# Patient Record
Sex: FEMALE | Race: WHITE | NOT HISPANIC OR LATINO
[De-identification: names, ages, dates, MRNs, and addresses within clinical notes are randomized per-mention and may not be internally consistent; named-entity substitution may affect disease eponyms.]

---

## 2017-01-03 ENCOUNTER — MEDICATION RENEWAL (OUTPATIENT)
Age: 72
End: 2017-01-03

## 2017-01-04 ENCOUNTER — MEDICATION RENEWAL (OUTPATIENT)
Age: 72
End: 2017-01-04

## 2017-02-06 ENCOUNTER — OTHER (OUTPATIENT)
Age: 72
End: 2017-02-06

## 2017-03-15 ENCOUNTER — OTHER (OUTPATIENT)
Age: 72
End: 2017-03-15

## 2017-04-01 LAB
ALBUMIN SERPL ELPH-MCNC: 4.3 G/DL
ALP BLD-CCNC: 102 U/L
ALT SERPL-CCNC: 30 U/L
ANION GAP SERPL CALC-SCNC: 17 MMOL/L
AST SERPL-CCNC: 28 U/L
BILIRUB SERPL-MCNC: 1.3 MG/DL
BUN SERPL-MCNC: 16 MG/DL
CALCIUM SERPL-MCNC: 9.8 MG/DL
CHLORIDE SERPL-SCNC: 96 MMOL/L
CHOLEST SERPL-MCNC: 198 MG/DL
CHOLEST/HDLC SERPL: 3.9 RATIO
CO2 SERPL-SCNC: 27 MMOL/L
CREAT SERPL-MCNC: 0.88 MG/DL
GLUCOSE SERPL-MCNC: 106 MG/DL
HDLC SERPL-MCNC: 51 MG/DL
LDLC SERPL CALC-MCNC: 117 MG/DL
POTASSIUM SERPL-SCNC: 3.8 MMOL/L
PROT SERPL-MCNC: 7 G/DL
SODIUM SERPL-SCNC: 140 MMOL/L
TRIGL SERPL-MCNC: 150 MG/DL

## 2017-04-05 ENCOUNTER — APPOINTMENT (OUTPATIENT)
Dept: CARDIOLOGY | Facility: CLINIC | Age: 72
End: 2017-04-05

## 2017-04-05 ENCOUNTER — NON-APPOINTMENT (OUTPATIENT)
Age: 72
End: 2017-04-05

## 2017-04-05 VITALS
DIASTOLIC BLOOD PRESSURE: 84 MMHG | HEART RATE: 71 BPM | BODY MASS INDEX: 27.49 KG/M2 | HEIGHT: 64 IN | WEIGHT: 161 LBS | OXYGEN SATURATION: 98 % | SYSTOLIC BLOOD PRESSURE: 129 MMHG

## 2017-06-26 ENCOUNTER — MEDICATION RENEWAL (OUTPATIENT)
Age: 72
End: 2017-06-26

## 2017-09-06 ENCOUNTER — OTHER (OUTPATIENT)
Age: 72
End: 2017-09-06

## 2017-09-20 LAB
ALBUMIN SERPL ELPH-MCNC: 4.4 G/DL
ALP BLD-CCNC: 87 U/L
ALT SERPL-CCNC: 36 U/L
ANION GAP SERPL CALC-SCNC: 17 MMOL/L
AST SERPL-CCNC: 35 U/L
BILIRUB SERPL-MCNC: 1.3 MG/DL
BUN SERPL-MCNC: 19 MG/DL
CALCIUM SERPL-MCNC: 10.1 MG/DL
CHLORIDE SERPL-SCNC: 96 MMOL/L
CHOLEST SERPL-MCNC: 206 MG/DL
CHOLEST/HDLC SERPL: 3.8 RATIO
CO2 SERPL-SCNC: 29 MMOL/L
CREAT SERPL-MCNC: 1 MG/DL
GLUCOSE SERPL-MCNC: 99 MG/DL
HDLC SERPL-MCNC: 54 MG/DL
LDLC SERPL CALC-MCNC: 116 MG/DL
POTASSIUM SERPL-SCNC: 4.1 MMOL/L
PROT SERPL-MCNC: 7.2 G/DL
SODIUM SERPL-SCNC: 142 MMOL/L
TRIGL SERPL-MCNC: 182 MG/DL

## 2017-09-26 ENCOUNTER — NON-APPOINTMENT (OUTPATIENT)
Age: 72
End: 2017-09-26

## 2017-09-26 ENCOUNTER — APPOINTMENT (OUTPATIENT)
Dept: CARDIOLOGY | Facility: CLINIC | Age: 72
End: 2017-09-26
Payer: MEDICARE

## 2017-09-26 VITALS
DIASTOLIC BLOOD PRESSURE: 82 MMHG | HEIGHT: 64 IN | HEART RATE: 77 BPM | WEIGHT: 163 LBS | OXYGEN SATURATION: 98 % | BODY MASS INDEX: 27.83 KG/M2 | SYSTOLIC BLOOD PRESSURE: 126 MMHG

## 2017-09-26 PROCEDURE — 99215 OFFICE O/P EST HI 40 MIN: CPT

## 2017-09-26 PROCEDURE — 93000 ELECTROCARDIOGRAM COMPLETE: CPT

## 2017-10-07 ENCOUNTER — APPOINTMENT (OUTPATIENT)
Dept: CARDIOLOGY | Facility: CLINIC | Age: 72
End: 2017-10-07
Payer: MEDICARE

## 2017-10-07 PROCEDURE — 93880 EXTRACRANIAL BILAT STUDY: CPT

## 2017-10-23 ENCOUNTER — APPOINTMENT (OUTPATIENT)
Dept: CARDIOLOGY | Facility: CLINIC | Age: 72
End: 2017-10-23
Payer: MEDICARE

## 2017-10-23 PROCEDURE — 93306 TTE W/DOPPLER COMPLETE: CPT

## 2017-11-20 ENCOUNTER — OTHER (OUTPATIENT)
Age: 72
End: 2017-11-20

## 2017-11-25 LAB
ALBUMIN SERPL ELPH-MCNC: 4.3 G/DL
ALP BLD-CCNC: 77 U/L
ALT SERPL-CCNC: 26 U/L
ANION GAP SERPL CALC-SCNC: 13 MMOL/L
AST SERPL-CCNC: 29 U/L
BILIRUB SERPL-MCNC: 1.7 MG/DL
BUN SERPL-MCNC: 16 MG/DL
CALCIUM SERPL-MCNC: 9.9 MG/DL
CHLORIDE SERPL-SCNC: 95 MMOL/L
CHOLEST SERPL-MCNC: 168 MG/DL
CHOLEST/HDLC SERPL: 3.7 RATIO
CO2 SERPL-SCNC: 31 MMOL/L
CREAT SERPL-MCNC: 0.88 MG/DL
GLUCOSE SERPL-MCNC: 95 MG/DL
HDLC SERPL-MCNC: 46 MG/DL
LDLC SERPL CALC-MCNC: 88 MG/DL
POTASSIUM SERPL-SCNC: 3.8 MMOL/L
PROT SERPL-MCNC: 7.1 G/DL
SODIUM SERPL-SCNC: 139 MMOL/L
TRIGL SERPL-MCNC: 168 MG/DL

## 2017-12-12 ENCOUNTER — RX RENEWAL (OUTPATIENT)
Age: 72
End: 2017-12-12

## 2017-12-19 ENCOUNTER — MEDICATION RENEWAL (OUTPATIENT)
Age: 72
End: 2017-12-19

## 2018-02-26 ENCOUNTER — OTHER (OUTPATIENT)
Age: 73
End: 2018-02-26

## 2018-03-25 LAB
ALBUMIN SERPL ELPH-MCNC: 4 G/DL
ALP BLD-CCNC: 82 U/L
ALT SERPL-CCNC: 40 U/L
ANION GAP SERPL CALC-SCNC: 15 MMOL/L
AST SERPL-CCNC: 35 U/L
BILIRUB SERPL-MCNC: 1.6 MG/DL
BUN SERPL-MCNC: 19 MG/DL
CALCIUM SERPL-MCNC: 10 MG/DL
CHLORIDE SERPL-SCNC: 96 MMOL/L
CHOLEST SERPL-MCNC: 129 MG/DL
CHOLEST/HDLC SERPL: 3.7 RATIO
CO2 SERPL-SCNC: 29 MMOL/L
CREAT SERPL-MCNC: 0.83 MG/DL
GLUCOSE SERPL-MCNC: 94 MG/DL
HDLC SERPL-MCNC: 35 MG/DL
LDLC SERPL CALC-MCNC: 68 MG/DL
POTASSIUM SERPL-SCNC: 3.8 MMOL/L
PROT SERPL-MCNC: 6.8 G/DL
SODIUM SERPL-SCNC: 140 MMOL/L
TRIGL SERPL-MCNC: 131 MG/DL

## 2018-04-03 ENCOUNTER — APPOINTMENT (OUTPATIENT)
Dept: CARDIOLOGY | Facility: CLINIC | Age: 73
End: 2018-04-03
Payer: MEDICARE

## 2018-04-03 ENCOUNTER — NON-APPOINTMENT (OUTPATIENT)
Age: 73
End: 2018-04-03

## 2018-04-03 VITALS
DIASTOLIC BLOOD PRESSURE: 92 MMHG | OXYGEN SATURATION: 100 % | BODY MASS INDEX: 27.31 KG/M2 | HEIGHT: 64 IN | HEART RATE: 90 BPM | WEIGHT: 160 LBS | SYSTOLIC BLOOD PRESSURE: 144 MMHG

## 2018-04-03 VITALS — SYSTOLIC BLOOD PRESSURE: 130 MMHG | DIASTOLIC BLOOD PRESSURE: 80 MMHG

## 2018-04-03 PROCEDURE — 93000 ELECTROCARDIOGRAM COMPLETE: CPT

## 2018-04-03 PROCEDURE — 99215 OFFICE O/P EST HI 40 MIN: CPT

## 2018-05-08 ENCOUNTER — APPOINTMENT (OUTPATIENT)
Dept: CARDIOLOGY | Facility: CLINIC | Age: 73
End: 2018-05-08

## 2018-05-25 ENCOUNTER — APPOINTMENT (OUTPATIENT)
Dept: CARDIOLOGY | Facility: CLINIC | Age: 73
End: 2018-05-25
Payer: MEDICARE

## 2018-05-25 PROCEDURE — 93306 TTE W/DOPPLER COMPLETE: CPT

## 2018-06-28 ENCOUNTER — MEDICATION RENEWAL (OUTPATIENT)
Age: 73
End: 2018-06-28

## 2018-09-08 LAB
ALBUMIN SERPL ELPH-MCNC: 4.6 G/DL
ALP BLD-CCNC: 74 U/L
ALT SERPL-CCNC: 30 U/L
ANION GAP SERPL CALC-SCNC: 15 MMOL/L
AST SERPL-CCNC: 33 U/L
BILIRUB SERPL-MCNC: 1.4 MG/DL
BUN SERPL-MCNC: 15 MG/DL
CALCIUM SERPL-MCNC: 10.1 MG/DL
CHLORIDE SERPL-SCNC: 101 MMOL/L
CHOLEST SERPL-MCNC: 187 MG/DL
CHOLEST/HDLC SERPL: 4 RATIO
CO2 SERPL-SCNC: 29 MMOL/L
CREAT SERPL-MCNC: 0.82 MG/DL
GLUCOSE SERPL-MCNC: 108 MG/DL
HDLC SERPL-MCNC: 47 MG/DL
LDLC SERPL CALC-MCNC: 111 MG/DL
POTASSIUM SERPL-SCNC: 4 MMOL/L
PROT SERPL-MCNC: 7.1 G/DL
SODIUM SERPL-SCNC: 145 MMOL/L
TRIGL SERPL-MCNC: 146 MG/DL

## 2018-09-18 ENCOUNTER — NON-APPOINTMENT (OUTPATIENT)
Age: 73
End: 2018-09-18

## 2018-09-18 ENCOUNTER — APPOINTMENT (OUTPATIENT)
Dept: CARDIOLOGY | Facility: CLINIC | Age: 73
End: 2018-09-18
Payer: MEDICARE

## 2018-09-18 VITALS — SYSTOLIC BLOOD PRESSURE: 130 MMHG | DIASTOLIC BLOOD PRESSURE: 78 MMHG

## 2018-09-18 VITALS
HEIGHT: 64 IN | WEIGHT: 156 LBS | OXYGEN SATURATION: 97 % | SYSTOLIC BLOOD PRESSURE: 152 MMHG | HEART RATE: 49 BPM | DIASTOLIC BLOOD PRESSURE: 79 MMHG | BODY MASS INDEX: 26.63 KG/M2

## 2018-09-18 PROCEDURE — 93000 ELECTROCARDIOGRAM COMPLETE: CPT

## 2018-09-18 PROCEDURE — 99215 OFFICE O/P EST HI 40 MIN: CPT

## 2019-03-25 ENCOUNTER — LABORATORY RESULT (OUTPATIENT)
Age: 74
End: 2019-03-25

## 2019-04-03 ENCOUNTER — APPOINTMENT (OUTPATIENT)
Dept: CARDIOLOGY | Facility: CLINIC | Age: 74
End: 2019-04-03
Payer: MEDICARE

## 2019-04-03 ENCOUNTER — NON-APPOINTMENT (OUTPATIENT)
Age: 74
End: 2019-04-03

## 2019-04-03 VITALS
WEIGHT: 160 LBS | DIASTOLIC BLOOD PRESSURE: 99 MMHG | HEART RATE: 74 BPM | HEIGHT: 64 IN | OXYGEN SATURATION: 93 % | SYSTOLIC BLOOD PRESSURE: 172 MMHG | BODY MASS INDEX: 27.31 KG/M2

## 2019-04-03 PROCEDURE — 99215 OFFICE O/P EST HI 40 MIN: CPT

## 2019-04-03 PROCEDURE — 93000 ELECTROCARDIOGRAM COMPLETE: CPT

## 2019-04-03 NOTE — PHYSICAL EXAM
[General Appearance - In No Acute Distress] : no acute distress [Normal Conjunctiva] : the conjunctiva exhibited no abnormalities [No Oral Pallor] : no oral pallor [Respiration, Rhythm And Depth] : normal respiratory rhythm and effort [Auscultation Breath Sounds / Voice Sounds] : lungs were clear to auscultation bilaterally [Bowel Sounds] : normal bowel sounds [Abdomen Tenderness] : non-tender [Abnormal Walk] : normal gait [Nail Clubbing] : no clubbing of the fingernails [] : no rash [Oriented To Time, Place, And Person] : oriented to person, place, and time [Not Palpable] : not palpable [No Precordial Heave] : no precordial heave was noted [Normal Rate] : normal [Rhythm Regular] : regular [Normal S1] : normal S1 [S2 Single] : was not split [No Gallop] : no gallop heard [Harsh] : harsh [Carotids] : the murmur was transmitted to the carotid arteries [III] : a grade 3 [2+] : left 2+ [No Abnormalities] : the abdominal aorta was not enlarged and no bruit was heard [No Pitting Edema] : no pitting edema present [FreeTextEntry1] : no JVD is appreciated at a 45° angle [Apical Thrill] : no thrill palpable at the apex [Normal S2] : abnormal S2 [Click] : no click [Pericardial Rub] : no pericardial rub [Right Carotid Bruit] : no bruit heard over the right carotid [Left Carotid Bruit] : no bruit heard over the left carotid

## 2019-04-03 NOTE — DISCUSSION/SUMMARY
[FreeTextEntry1] : Mrs. Rodríguez has been doing well from a cardiac symptomatic standpoint since her previous visit here on 9/18/18. Specifically, she has not experienced recurrence of vagally-mediated presyncope or syncope. She does not describe having experienced any signs or symptoms to suggest the development of an anginal syndrome, congestive heart failure, or a hemodynamically-compromising arrhythmia. Her electrocardiogram today reveals sinus rhythm and is within normal limits, essentially unchanged from her previous office tracing, other than supraventricular ectopy having been exhibited on the previous tracing. Her cardiac examination today is remarkable for a grade III/VI harsh systolic ejection murmur, compatible with the presence of severe aortic stenosis, unchanged from her previous visit with me. Her blood pressure reading was moderately elevated upon initial presentation to the office today, however, a follow-up measurement obtained after her examination revealed the reading to be normal.\par \par I have reviewed the findings of the blood test report of 3/25/19 in detail with the patient today, and I have instructed her to continue on the present dosages of Crestor and Zetia the time being. Unfortunately, the patient was unable to tolerate a higher dosage of the Crestor previously.\par \par I have reviewed the findings of the carotid artery Doppler study of 10/7/17 and the echocardiogram of 5/25/18 in detail with the patient today. Follow-up studies will be planned for May of 2019 for reassessment of the degree of carotid atherosclerosis and the degree of aortic stenosis, respectively.\par \par I have again discussed the implications of aortic stenosis with the patient today, including the natural history. I alerted her to the potential signs and symptoms of progressive aortic stenosis, including dyspnea on exertion, chest discomfort, and post-exertional presyncope/syncope. I have asked her to call me if she should experience any of these symptoms.\par \par The importance of proper dietary habits, weight loss, and continued regular exercise was again discussed with the patient today.\par \par I have asked the patient to call me if she should have a questions or problems pertaining to these matters, and especially if she should experience any concerning symptoms. I have otherwise asked the patient to return to the office for follow-up cardiac evaluation in 6 months, provided she remains clinically stable in the interim, and the findings on the upcoming echocardiogram and/or carotid artery Doppler studies did not want sooner evaluation and/or treatment.

## 2019-04-03 NOTE — HISTORY OF PRESENT ILLNESS
[FreeTextEntry1] : Mrs. Shauna Rodríguez presented to the office today for follow-up cardiac evaluation.\par \par The patient is a 73-year-old female with a history of vagally-mediated presyncope/syncope, ventricular ectopy, severe aortic stenosis, moderate carotid atherosclerosis, hypertension, a dyslipidemia, osteopenia, GERD, and a malignant colon polyp (status post right hemicolectomy 6/17/16).\par \par The patient has been doing well from a cardiac symptomatic standpoint since her previous visit here on 9/18/18. Specifically, she has not experienced recurrence of vagally-mediated presyncope or syncope. She has not experienced chest discomfort or dyspnea on exertion in association with her activities. She has not noted orthopnea, paroxysmal nocturnal dyspnea, or lower extremity edema. 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 She subsequently underwent laparoscopic right hemicolectomy on 6/7/16, without any associated complications. She underwent a surveillance colonoscopy in August of 2017, and states that "everything was clear". She had been experiencing  right proximal left lower extremity discomfort, describing sciatica, which improved in response to physical therapy. She then developed left-sided sciatica, which also improved with physical therapy.\par \par Echocardiography most recently performed on 5/25/18 revealed normal cardiac chamber sizes with normal left ventricular wall thickness and motion. Left ventricular systolic function was normal, with a calculated ejection fraction of 66%. There was evidence for mild left ventricular diastolic dysfunction. There was evidence for severe aortic stenosis (peak gradient 77 mmHg, estimated LENORA 0.7 cm²).\par \par Carotid artery Doppler study most recently performed on 10/7/17 revealed mild to moderate formation bilaterally, however, no significant stenoses were demonstrated.\par \par I had spoken with the patient on the telephone on 10/18/16 after reviewing her laboratory results, and I instructed her at that time to switch from Zocor 20 mg daily and Zetia 10 mg daily to Crestor 5 mg daily. I then spoke with her on 12/19/16 after reviewing her laboratory study results, and instructed her to increase the dosage of Crestor to 10 mg daily. She subsequently developed muscle aches, and when I spoke with her on the telephone on 2/6/17 regarding this issue, I instructed her to reduce the dosage of Crestor to 5 mg daily, and to resume therapy with Zetia at a dosage of 10 mg daily. She has been tolerating this regimen thus far.\par \par Laboratory studies performed on 3/25/19 revealed cholesterol 188, triglycerides 186, HDL 47, and calculated . The liver chemistries were normal. The glucose level was 96. The BUN and creatinine were 15 and 0.77, respectively. The potassium level was 4.1.\par \par Previous History:\par \par I had initially evaluated the patient on 11/1/05, regarding a syncopal episode. She had been making soup that morning, and the hot soup splattered and resulted in a minor burn to her right neck region. A few hours later, she was having her nails manicured at a salon, and the manicurist (who was from [facility]) suggested that she apply a balm to the burned area. While the manicurist was massaging this balm into the right neck region, the patient became nauseous and diaphoretic. She put her head down on the table, and experienced a brief syncopal episode. She was brought to the emergency room at [facility], where she was evaluated and subsequently released. Note that the patient reported having also experienced an episode of presyncope several years earlier, which occurred after she donated blood.\par \par As far as risk factors for coronary artery disease are concerned, the patient has a history of a dyslipidemia and hypertension. She discontinued cigarette smoking approximately 23 years ago, having previously smoked one to one and a half packs per day for a period of 20 years. She does not have a history of diabetes. Her family history is unknown, as she was raised in a foster home.\par \par Past medical history is otherwise significant for osteopenia, for which the patient has been treated with Boniva injections by an endocrinologist. She has a history of GERD. She is status post a tubal ligation procedure. The patient is status post laparoscopic right hemicolectomy on 6/7/16 for resection of a sessile polyp, with a biopsy having revealed this polyp to represent adenocarcinoma. She describes having had 2 uncomplicated pregnancies, delivered vaginally.\par \par Nuclear exercise testing performed on 11/17/05 was negative for the inducement of cardiac symptoms or electrocardiographic evidence of myocardial ischemia. The cardiac imaging portion of the study revealed normal left ventricular myocardial perfusion and systolic function, with a calculated ejection fraction of 72%.\par \par Holter monitoring performed from 11/3/05 through 11/4/05 revealed sinus rhythm throughout the recording an average rate of 76 beats per minute. Rare supraventricular premature contractions and one ventricular premature contraction were noted. No symptoms were reported during the recording period.

## 2019-04-17 ENCOUNTER — MEDICATION RENEWAL (OUTPATIENT)
Age: 74
End: 2019-04-17

## 2019-05-07 ENCOUNTER — APPOINTMENT (OUTPATIENT)
Dept: CARDIOLOGY | Facility: CLINIC | Age: 74
End: 2019-05-07
Payer: MEDICARE

## 2019-05-07 PROCEDURE — 93880 EXTRACRANIAL BILAT STUDY: CPT

## 2019-05-10 ENCOUNTER — APPOINTMENT (OUTPATIENT)
Dept: CARDIOLOGY | Facility: CLINIC | Age: 74
End: 2019-05-10
Payer: MEDICARE

## 2019-05-10 PROCEDURE — 93306 TTE W/DOPPLER COMPLETE: CPT

## 2019-08-27 ENCOUNTER — OTHER (OUTPATIENT)
Age: 74
End: 2019-08-27

## 2019-09-27 ENCOUNTER — RX RENEWAL (OUTPATIENT)
Age: 74
End: 2019-09-27

## 2019-10-02 LAB
ALBUMIN SERPL ELPH-MCNC: 4.6 G/DL
ALP BLD-CCNC: 102 U/L
ALT SERPL-CCNC: 34 U/L
ANION GAP SERPL CALC-SCNC: 15 MMOL/L
AST SERPL-CCNC: 27 U/L
BILIRUB SERPL-MCNC: 1.4 MG/DL
BUN SERPL-MCNC: 15 MG/DL
CALCIUM SERPL-MCNC: 10.1 MG/DL
CHLORIDE SERPL-SCNC: 97 MMOL/L
CHOLEST SERPL-MCNC: 146 MG/DL
CHOLEST/HDLC SERPL: 3.4 RATIO
CO2 SERPL-SCNC: 31 MMOL/L
CREAT SERPL-MCNC: 0.74 MG/DL
GLUCOSE SERPL-MCNC: 101 MG/DL
HDLC SERPL-MCNC: 43 MG/DL
LDLC SERPL CALC-MCNC: 70 MG/DL
POTASSIUM SERPL-SCNC: 3.9 MMOL/L
PROT SERPL-MCNC: 6.9 G/DL
SODIUM SERPL-SCNC: 143 MMOL/L
TRIGL SERPL-MCNC: 164 MG/DL

## 2019-10-16 ENCOUNTER — NON-APPOINTMENT (OUTPATIENT)
Age: 74
End: 2019-10-16

## 2019-10-16 ENCOUNTER — APPOINTMENT (OUTPATIENT)
Dept: CARDIOLOGY | Facility: CLINIC | Age: 74
End: 2019-10-16
Payer: MEDICARE

## 2019-10-16 VITALS
WEIGHT: 155 LBS | HEART RATE: 77 BPM | HEIGHT: 64 IN | BODY MASS INDEX: 26.46 KG/M2 | OXYGEN SATURATION: 96 % | SYSTOLIC BLOOD PRESSURE: 169 MMHG | DIASTOLIC BLOOD PRESSURE: 95 MMHG

## 2019-10-16 PROCEDURE — 99215 OFFICE O/P EST HI 40 MIN: CPT

## 2019-10-16 PROCEDURE — 93000 ELECTROCARDIOGRAM COMPLETE: CPT

## 2019-10-16 NOTE — HISTORY OF PRESENT ILLNESS
[FreeTextEntry1] : Mrs. Shauna Rodríguez presented to the office today for follow-up cardiac evaluation. I last evaluated the patient in the office on 4/3/19.\par \par The patient is a 74-year-old female with a history of vagally-mediated presyncope/syncope, ventricular ectopy, severe aortic stenosis, moderate carotid atherosclerosis, hypertension, a dyslipidemia, osteopenia, GERD, and a malignant colon polyp (status post right hemicolectomy 6/17/16).\par \par The patient has been doing well from a cardiac symptomatic standpoint since her previous visit here on 4/3/19. Specifically, she has not experienced recurrence of vagally-mediated presyncope or syncope. She has not experienced chest discomfort or dyspnea on exertion in association with her activities. She has not noted orthopnea, paroxysmal nocturnal dyspnea, or lower extremity edema. She has not experienced any episodes of palpitations.\par \par Review of systems is significant for the patient having been discovered as having a sessile cecal polyp on a routine follow-up colonoscopy performed on 4/15/16 (noting that she has a history of colonic polyps), with the biopsy revealing this polyp to be adenocarcinoma. She subsequently underwent laparoscopic right hemicolectomy on 6/7/16, without any associated complications. She underwent a surveillance colonoscopy in August of 2017, and states that "everything was clear". She had been experiencing  right proximal left lower extremity discomfort, describing sciatica, which improved in response to physical therapy. She then developed left-sided sciatica, which also improved with physical therapy.\par \par Echocardiography most recently performed on 5/10/19 revealed normal cardiac chamber size is with normal left ventricular wall thickness and wall motion. Left ventricular systolic function was normal, with an estimated ejection fraction of 65%. Impaired diastolic relaxation of the left ventricle was demonstrated. Severe aortic stenosis (peak gradient 64 mmHg, mean gradient 44 mmHg, estimated LENORA 0.4 cm²) mild aortic regurgitation were demonstrated. Very mild mitral regurgitation was demonstrated. Mild tricuspid regurgitation is demonstrated, without evidence of pulmonary hypertension.\par \par Carotid artery Doppler testing most recently performed on 5/7/19 revealed mild plaque formation involving the common carotid arteries, the bulbar regions, and the proximal portions of the internal carotid arteries bilaterally, representing a significant change when compared with a previous study performed on 10/7/17.\par \par I had spoken with the patient on the telephone on 10/18/16 after reviewing her laboratory results, and I instructed her at that time to switch from Zocor 20 mg daily and Zetia 10 mg daily to Crestor 5 mg daily. I then spoke with her on 12/19/16 after reviewing her laboratory study results, and instructed her to increase the dosage of Crestor to 10 mg daily. She subsequently developed muscle aches, and when I spoke with her on the telephone on 2/6/17 regarding this issue, I instructed her to reduce the dosage of Crestor to 5 mg daily, and to resume therapy with Zetia at a dosage of 10 mg daily. She has been tolerating this regimen thus far.\par \par Laboratory studies performed on 10/2/19 revealed cholesterol 146, triglycerides 164, HDL 43, and calculated LDL 70. The bilirubin was slightly elevated at 1.4, suspected as representing Gilbert's syndrome. The remainder of the liver enzymes were normal. The BUN and creatinine were 15 and 0.74, respectively. The potassium level was 3.9. The glucose level was 101.\par \par Previous History:\par \par I had initially evaluated the patient on 11/1/05, regarding a syncopal episode. She had been making soup that morning, and the hot soup splattered and resulted in a minor burn to her right neck region. A few hours later, she was having her nails manicured at a salon, and the manicurist (who was from [facility]) suggested that she apply a balm to the burned area. While the manicurist was massaging this balm into the right neck region, the patient became nauseous and diaphoretic. She put her head down on the table, and experienced a brief syncopal episode. She was brought to the emergency room at [facility], where she was evaluated and subsequently released. Note that the patient reported having also experienced an episode of presyncope several years earlier, which occurred after she donated blood.\par \par As far as risk factors for coronary artery disease are concerned, the patient has a history of a dyslipidemia and hypertension. She discontinued cigarette smoking approximately 23 years ago, having previously smoked one to one and a half packs per day for a period of 20 years. She does not have a history of diabetes. Her family history is unknown, as she was raised in a foster home.\par \par Past medical history is otherwise significant for osteopenia, for which the patient has been treated with Boniva injections by an endocrinologist. She has a history of GERD. She is status post a tubal ligation procedure. The patient is status post laparoscopic right hemicolectomy on 6/7/16 for resection of a sessile polyp, with a biopsy having revealed this polyp to represent adenocarcinoma. She describes having had 2 uncomplicated pregnancies, delivered vaginally.\par \par Nuclear exercise testing performed on 11/17/05 was negative for the inducement of cardiac symptoms or electrocardiographic evidence of myocardial ischemia. The cardiac imaging portion of the study revealed normal left ventricular myocardial perfusion and systolic function, with a calculated ejection fraction of 72%.\par \par Holter monitoring performed from 11/3/05 through 11/4/05 revealed sinus rhythm throughout the recording an average rate of 76 beats per minute. Rare supraventricular premature contractions and one ventricular premature contraction were noted. No symptoms were reported during the recording period.

## 2019-10-16 NOTE — DISCUSSION/SUMMARY
[FreeTextEntry1] : Mrs. Rodríguez has been doing well from a cardiac symptomatic standpoint since her previous visit here on 4/3/19. Specifically, she has not experienced recurrence of vagally-mediated presyncope or syncope. She does not describe having experienced any signs or symptoms to suggest the development of an anginal syndrome, congestive heart failure, or a hemodynamically-compromising arrhythmia. Her electrocardiogram today reveals sinus rhythm and is within normal limits, essentially unchanged from her previous office tracing, other than supraventricular ectopy having been exhibited on the previous tracing. Her cardiac examination today is remarkable for a grade III/VI harsh systolic ejection murmur, compatible with the presence of severe aortic stenosis, unchanged from her previous visit with me. Her blood pressure reading was moderately elevated upon initial presentation to the office today, however, a follow-up measurement obtained after her examination revealed the reading to be normal.\par \par I have reviewed the findings of the blood test report of 10/2/19 in detail with the patient today, and I have instructed her to continue on the present dosages of Crestor and Zetia the time being. Unfortunately, the patient was unable to tolerate a higher dosage of the Crestor previously.\par \par I have reviewed the findings of the carotid artery Doppler study of 5/7/19 and the echocardiogram of 5/10/19 in detail with the patient today. Follow-up studies will be planned for May of 2020 for reassessment of the degree of carotid atherosclerosis and the degree of aortic stenosis, respectively.\par \par I have again discussed the implications of aortic stenosis with the patient today, including the natural history. I alerted her to the potential signs and symptoms of progressive aortic stenosis, including dyspnea on exertion, chest discomfort, and post-exertional presyncope/syncope. I have asked her to call me if she should experience any of these symptoms.\par \par The importance of proper dietary habits, weight loss, and continued regular exercise was again discussed with the patient today.\par \par I have asked the patient to call me if she should have a questions or problems pertaining to these matters, and especially if she should experience any concerning symptoms. I have otherwise asked the patient to return to the office for follow-up cardiac evaluation in 6 months, provided she remains clinically stable in the interim. Follow-up echocardiography, carotid artery Doppler testing, and blood testing will be arranged at that time.

## 2019-10-31 ENCOUNTER — RX RENEWAL (OUTPATIENT)
Age: 74
End: 2019-10-31

## 2019-12-16 ENCOUNTER — RX RENEWAL (OUTPATIENT)
Age: 74
End: 2019-12-16

## 2020-03-30 ENCOUNTER — RX RENEWAL (OUTPATIENT)
Age: 75
End: 2020-03-30

## 2020-04-23 ENCOUNTER — RX RENEWAL (OUTPATIENT)
Age: 75
End: 2020-04-23

## 2020-06-23 ENCOUNTER — NON-APPOINTMENT (OUTPATIENT)
Age: 75
End: 2020-06-23

## 2020-06-23 ENCOUNTER — APPOINTMENT (OUTPATIENT)
Dept: CARDIOLOGY | Facility: CLINIC | Age: 75
End: 2020-06-23
Payer: MEDICARE

## 2020-06-23 VITALS
OXYGEN SATURATION: 97 % | HEIGHT: 64 IN | SYSTOLIC BLOOD PRESSURE: 138 MMHG | BODY MASS INDEX: 25.61 KG/M2 | DIASTOLIC BLOOD PRESSURE: 85 MMHG | WEIGHT: 150 LBS | HEART RATE: 72 BPM

## 2020-06-23 LAB
ANION GAP SERPL CALC-SCNC: 10 MMOL/L
BASOPHILS # BLD AUTO: 0.04 K/UL
BASOPHILS NFR BLD AUTO: 0.7 %
BUN SERPL-MCNC: 14 MG/DL
CALCIUM SERPL-MCNC: 9.6 MG/DL
CHLORIDE SERPL-SCNC: 99 MMOL/L
CHOLEST SERPL-MCNC: 168 MG/DL
CHOLEST/HDLC SERPL: 3.3 RATIO
CO2 SERPL-SCNC: 34 MMOL/L
CREAT SERPL-MCNC: 0.73 MG/DL
EOSINOPHIL # BLD AUTO: 0.23 K/UL
EOSINOPHIL NFR BLD AUTO: 3.9 %
ESTIMATED AVERAGE GLUCOSE: 123 MG/DL
GLUCOSE SERPL-MCNC: 104 MG/DL
HBA1C MFR BLD HPLC: 5.9 %
HCT VFR BLD CALC: 40.3 %
HDLC SERPL-MCNC: 50 MG/DL
HGB BLD-MCNC: 13.4 G/DL
IMM GRANULOCYTES NFR BLD AUTO: 0.2 %
LDLC SERPL CALC-MCNC: 96 MG/DL
LYMPHOCYTES # BLD AUTO: 1.5 K/UL
LYMPHOCYTES NFR BLD AUTO: 25.4 %
MAN DIFF?: NORMAL
MCHC RBC-ENTMCNC: 27 PG
MCHC RBC-ENTMCNC: 33.3 GM/DL
MCV RBC AUTO: 81.1 FL
MONOCYTES # BLD AUTO: 0.55 K/UL
MONOCYTES NFR BLD AUTO: 9.3 %
NEUTROPHILS # BLD AUTO: 3.58 K/UL
NEUTROPHILS NFR BLD AUTO: 60.5 %
PLATELET # BLD AUTO: 242 K/UL
POTASSIUM SERPL-SCNC: 3.7 MMOL/L
RBC # BLD: 4.97 M/UL
RBC # FLD: 14 %
SODIUM SERPL-SCNC: 143 MMOL/L
TRIGL SERPL-MCNC: 112 MG/DL
WBC # FLD AUTO: 5.91 K/UL

## 2020-06-23 PROCEDURE — 93000 ELECTROCARDIOGRAM COMPLETE: CPT

## 2020-06-23 PROCEDURE — 99215 OFFICE O/P EST HI 40 MIN: CPT

## 2020-07-17 ENCOUNTER — APPOINTMENT (OUTPATIENT)
Dept: CARDIOLOGY | Facility: CLINIC | Age: 75
End: 2020-07-17
Payer: MEDICARE

## 2020-07-17 PROCEDURE — 93306 TTE W/DOPPLER COMPLETE: CPT

## 2020-10-15 ENCOUNTER — RX RENEWAL (OUTPATIENT)
Age: 75
End: 2020-10-15

## 2020-10-26 ENCOUNTER — LABORATORY RESULT (OUTPATIENT)
Age: 75
End: 2020-10-26

## 2020-10-26 LAB
25(OH)D3 SERPL-MCNC: 32.4 NG/ML
ALBUMIN SERPL ELPH-MCNC: 4.5 G/DL
ALP BLD-CCNC: 100 U/L
ALT SERPL-CCNC: 62 U/L
ANION GAP SERPL CALC-SCNC: 14 MMOL/L
AST SERPL-CCNC: 49 U/L
BASOPHILS # BLD AUTO: 0.04 K/UL
BASOPHILS NFR BLD AUTO: 0.6 %
BILIRUB SERPL-MCNC: 1.2 MG/DL
BUN SERPL-MCNC: 15 MG/DL
CALCIUM SERPL-MCNC: 9.3 MG/DL
CHLORIDE SERPL-SCNC: 100 MMOL/L
CHOLEST SERPL-MCNC: 159 MG/DL
CO2 SERPL-SCNC: 29 MMOL/L
CREAT SERPL-MCNC: 0.72 MG/DL
EOSINOPHIL # BLD AUTO: 0.29 K/UL
EOSINOPHIL NFR BLD AUTO: 4.5 %
ESTIMATED AVERAGE GLUCOSE: 114 MG/DL
GLUCOSE SERPL-MCNC: 94 MG/DL
HBA1C MFR BLD HPLC: 5.6 %
HCT VFR BLD CALC: 40.2 %
HDLC SERPL-MCNC: 43 MG/DL
HGB BLD-MCNC: 13 G/DL
IMM GRANULOCYTES NFR BLD AUTO: 0.3 %
LDLC SERPL CALC-MCNC: 85 MG/DL
LYMPHOCYTES # BLD AUTO: 1.35 K/UL
LYMPHOCYTES NFR BLD AUTO: 21.2 %
MAN DIFF?: NORMAL
MCHC RBC-ENTMCNC: 27.7 PG
MCHC RBC-ENTMCNC: 32.3 GM/DL
MCV RBC AUTO: 85.5 FL
MONOCYTES # BLD AUTO: 0.54 K/UL
MONOCYTES NFR BLD AUTO: 8.5 %
NEUTROPHILS # BLD AUTO: 4.14 K/UL
NEUTROPHILS NFR BLD AUTO: 64.9 %
NONHDLC SERPL-MCNC: 116 MG/DL
PLATELET # BLD AUTO: 204 K/UL
POTASSIUM SERPL-SCNC: 4.6 MMOL/L
PROT SERPL-MCNC: 6.4 G/DL
RBC # BLD: 4.7 M/UL
RBC # FLD: 13.6 %
SODIUM SERPL-SCNC: 143 MMOL/L
T3RU NFR SERPL: 1 TBI
T4 SERPL-MCNC: 8.7 UG/DL
TRIGL SERPL-MCNC: 155 MG/DL
TSH SERPL-ACNC: 1.74 UIU/ML
WBC # FLD AUTO: 6.38 K/UL

## 2020-10-28 ENCOUNTER — NON-APPOINTMENT (OUTPATIENT)
Age: 75
End: 2020-10-28

## 2020-10-28 ENCOUNTER — APPOINTMENT (OUTPATIENT)
Dept: CARDIOLOGY | Facility: CLINIC | Age: 75
End: 2020-10-28
Payer: MEDICARE

## 2020-10-28 VITALS
HEIGHT: 64 IN | HEART RATE: 72 BPM | SYSTOLIC BLOOD PRESSURE: 128 MMHG | OXYGEN SATURATION: 97 % | BODY MASS INDEX: 25.27 KG/M2 | DIASTOLIC BLOOD PRESSURE: 69 MMHG | WEIGHT: 148 LBS

## 2020-10-28 DIAGNOSIS — I44.0 ATRIOVENTRICULAR BLOCK, FIRST DEGREE: ICD-10-CM

## 2020-10-28 PROCEDURE — 93000 ELECTROCARDIOGRAM COMPLETE: CPT

## 2020-10-28 PROCEDURE — 99215 OFFICE O/P EST HI 40 MIN: CPT

## 2021-01-04 ENCOUNTER — RX RENEWAL (OUTPATIENT)
Age: 76
End: 2021-01-04

## 2021-01-11 ENCOUNTER — RX RENEWAL (OUTPATIENT)
Age: 76
End: 2021-01-11

## 2021-01-22 ENCOUNTER — RX RENEWAL (OUTPATIENT)
Age: 76
End: 2021-01-22

## 2021-03-16 ENCOUNTER — RX RENEWAL (OUTPATIENT)
Age: 76
End: 2021-03-16

## 2021-04-15 ENCOUNTER — APPOINTMENT (OUTPATIENT)
Dept: INTERNAL MEDICINE | Facility: CLINIC | Age: 76
End: 2021-04-15
Payer: MEDICARE

## 2021-04-15 ENCOUNTER — NON-APPOINTMENT (OUTPATIENT)
Age: 76
End: 2021-04-15

## 2021-04-15 VITALS
SYSTOLIC BLOOD PRESSURE: 131 MMHG | RESPIRATION RATE: 12 BRPM | HEART RATE: 81 BPM | HEIGHT: 64 IN | DIASTOLIC BLOOD PRESSURE: 78 MMHG | OXYGEN SATURATION: 98 % | WEIGHT: 155 LBS | BODY MASS INDEX: 26.46 KG/M2

## 2021-04-15 VITALS — DIASTOLIC BLOOD PRESSURE: 78 MMHG | SYSTOLIC BLOOD PRESSURE: 120 MMHG

## 2021-04-15 DIAGNOSIS — F41.9 ANXIETY DISORDER, UNSPECIFIED: ICD-10-CM

## 2021-04-15 PROCEDURE — 99214 OFFICE O/P EST MOD 30 MIN: CPT

## 2021-04-15 NOTE — REVIEW OF SYSTEMS
[Insomnia] : insomnia [Anxiety] : anxiety [Depression] : depression [Negative] : Gastrointestinal [Suicidal] : not suicidal

## 2021-04-15 NOTE — PHYSICAL EXAM
[No Acute Distress] : no acute distress [No Lymphadenopathy] : no lymphadenopathy [Thyroid Normal, No Nodules] : the thyroid was normal and there were no nodules present [No Carotid Bruits] : no carotid bruits [No Abdominal Bruit] : a ~M bruit was not heard ~T in the abdomen [No Edema] : there was no peripheral edema [Normal] : soft, non-tender, non-distended, no masses palpated, no HSM and normal bowel sounds [No Focal Deficits] : no focal deficits [Speech Grossly Normal] : speech grossly normal [Normal Insight/Judgement] : insight and judgment were intact [de-identified] : TEARFUL

## 2021-04-15 NOTE — HISTORY OF PRESENT ILLNESS
[FreeTextEntry1] : c/o anxiety  [de-identified] : PATIENT W/ H/O HTN , HLD , C/O ANXIETY , FEELING DEPRESSED OVER HEALTH ISSUES OF HER SPOUSE WHO IS NOW IN [place].

## 2021-07-23 RX ORDER — ESCITALOPRAM OXALATE 10 MG/1
10 TABLET ORAL DAILY
Qty: 30 | Refills: 1 | Status: DISCONTINUED | COMMUNITY
Start: 2021-04-15 | End: 2021-07-23

## 2021-09-20 LAB
25(OH)D3 SERPL-MCNC: 33.3 NG/ML
ALBUMIN SERPL ELPH-MCNC: 4.5 G/DL
ALP BLD-CCNC: 98 U/L
ALT SERPL-CCNC: 43 U/L
ANION GAP SERPL CALC-SCNC: 10 MMOL/L
AST SERPL-CCNC: 38 U/L
BASOPHILS # BLD AUTO: 0.02 K/UL
BASOPHILS NFR BLD AUTO: 0.3 %
BILIRUB SERPL-MCNC: 1.2 MG/DL
BUN SERPL-MCNC: 10 MG/DL
CALCIUM SERPL-MCNC: 9.7 MG/DL
CHLORIDE SERPL-SCNC: 98 MMOL/L
CHOLEST SERPL-MCNC: 188 MG/DL
CO2 SERPL-SCNC: 31 MMOL/L
CREAT SERPL-MCNC: 0.68 MG/DL
EOSINOPHIL # BLD AUTO: 0.24 K/UL
EOSINOPHIL NFR BLD AUTO: 3.9 %
GLUCOSE SERPL-MCNC: 99 MG/DL
HCT VFR BLD CALC: 39.8 %
HDLC SERPL-MCNC: 43 MG/DL
HGB BLD-MCNC: 13.5 G/DL
IMM GRANULOCYTES NFR BLD AUTO: 0.3 %
LDLC SERPL CALC-MCNC: 95 MG/DL
LYMPHOCYTES # BLD AUTO: 1.33 K/UL
LYMPHOCYTES NFR BLD AUTO: 21.7 %
MAN DIFF?: NORMAL
MCHC RBC-ENTMCNC: 27.7 PG
MCHC RBC-ENTMCNC: 33.9 GM/DL
MCV RBC AUTO: 81.7 FL
MONOCYTES # BLD AUTO: 0.61 K/UL
MONOCYTES NFR BLD AUTO: 10 %
NEUTROPHILS # BLD AUTO: 3.91 K/UL
NEUTROPHILS NFR BLD AUTO: 63.8 %
NONHDLC SERPL-MCNC: 145 MG/DL
PLATELET # BLD AUTO: 197 K/UL
POTASSIUM SERPL-SCNC: 4.1 MMOL/L
PROT SERPL-MCNC: 6.7 G/DL
RBC # BLD: 4.87 M/UL
RBC # FLD: 13.9 %
SODIUM SERPL-SCNC: 139 MMOL/L
TRIGL SERPL-MCNC: 249 MG/DL
WBC # FLD AUTO: 6.13 K/UL

## 2021-09-21 LAB
ESTIMATED AVERAGE GLUCOSE: 120 MG/DL
HBA1C MFR BLD HPLC: 5.8 %

## 2021-09-28 ENCOUNTER — APPOINTMENT (OUTPATIENT)
Dept: CARDIOLOGY | Facility: CLINIC | Age: 76
End: 2021-09-28
Payer: MEDICARE

## 2021-09-28 ENCOUNTER — NON-APPOINTMENT (OUTPATIENT)
Age: 76
End: 2021-09-28

## 2021-09-28 VITALS
BODY MASS INDEX: 26.29 KG/M2 | DIASTOLIC BLOOD PRESSURE: 82 MMHG | WEIGHT: 154 LBS | HEIGHT: 64 IN | HEART RATE: 62 BPM | SYSTOLIC BLOOD PRESSURE: 154 MMHG | OXYGEN SATURATION: 99 %

## 2021-09-28 PROCEDURE — 99215 OFFICE O/P EST HI 40 MIN: CPT

## 2021-09-28 PROCEDURE — 93000 ELECTROCARDIOGRAM COMPLETE: CPT

## 2021-09-28 RX ORDER — ALPRAZOLAM 0.25 MG/1
0.25 TABLET ORAL
Qty: 15 | Refills: 0 | Status: DISCONTINUED | COMMUNITY
Start: 2021-04-16 | End: 2021-09-28

## 2021-10-06 ENCOUNTER — MED ADMIN CHARGE (OUTPATIENT)
Age: 76
End: 2021-10-06

## 2021-10-06 ENCOUNTER — APPOINTMENT (OUTPATIENT)
Dept: CARDIOLOGY | Facility: CLINIC | Age: 76
End: 2021-10-06
Payer: MEDICARE

## 2021-10-06 PROCEDURE — 93306 TTE W/DOPPLER COMPLETE: CPT

## 2021-10-06 RX ORDER — PERFLUTREN 6.52 MG/ML
6.52 INJECTION, SUSPENSION INTRAVENOUS
Qty: 2 | Refills: 0 | Status: COMPLETED | OUTPATIENT
Start: 2021-10-06

## 2021-11-09 ENCOUNTER — APPOINTMENT (OUTPATIENT)
Dept: INTERNAL MEDICINE | Facility: CLINIC | Age: 76
End: 2021-11-09
Payer: MEDICARE

## 2021-11-09 VITALS
BODY MASS INDEX: 26.46 KG/M2 | DIASTOLIC BLOOD PRESSURE: 79 MMHG | RESPIRATION RATE: 12 BRPM | HEIGHT: 64 IN | OXYGEN SATURATION: 95 % | WEIGHT: 155 LBS | SYSTOLIC BLOOD PRESSURE: 137 MMHG | HEART RATE: 87 BPM

## 2021-11-09 VITALS — DIASTOLIC BLOOD PRESSURE: 80 MMHG | SYSTOLIC BLOOD PRESSURE: 120 MMHG

## 2021-11-09 DIAGNOSIS — Z85.038 PERSONAL HISTORY OF OTHER MALIGNANT NEOPLASM OF LARGE INTESTINE: ICD-10-CM

## 2021-11-09 PROCEDURE — 99212 OFFICE O/P EST SF 10 MIN: CPT | Mod: 25

## 2021-11-09 PROCEDURE — G0439: CPT

## 2021-11-09 PROCEDURE — G0444 DEPRESSION SCREEN ANNUAL: CPT | Mod: 59

## 2021-11-10 DIAGNOSIS — G47.00 INSOMNIA, UNSPECIFIED: ICD-10-CM

## 2021-11-10 RX ORDER — ESZOPICLONE 1 MG/1
1 TABLET, FILM COATED ORAL
Qty: 15 | Refills: 0 | Status: ACTIVE | COMMUNITY
Start: 2021-11-10 | End: 1900-01-01

## 2021-11-10 NOTE — HISTORY OF PRESENT ILLNESS
[de-identified] : 1. HTN \par 2. H/O COLON CANCER : SCHEDULED FOR F/U COLONOSCOPY\par THIS MONTH , DR CARVAJAL \par 3.  HLD \par 4. c/o difficulty falling asleep , will like a sleeping pill to help her

## 2021-11-10 NOTE — ASSESSMENT
[FreeTextEntry1] : -STABLE \par -CONTINUE PRESENT MEDS \par -B/W REVIEWED \par -ADVISED LOW CHOL DIET , REDUCE CARBS

## 2021-11-10 NOTE — PHYSICAL EXAM
[No Acute Distress] : no acute distress [Well Nourished] : well nourished [Normal Sclera/Conjunctiva] : normal sclera/conjunctiva [PERRL] : pupils equal round and reactive to light [Fundoscopic Exam Performed] : fundoscopic ~T exam ~C was performed [Normal Outer Ear/Nose] : the outer ears and nose were normal in appearance [Normal Oropharynx] : the oropharynx was normal [No Lymphadenopathy] : no lymphadenopathy [Thyroid Normal, No Nodules] : the thyroid was normal and there were no nodules present [Regular Rhythm] : with a regular rhythm [Normal S1, S2] : normal S1 and S2 [No Carotid Bruits] : no carotid bruits [No Abdominal Bruit] : a ~M bruit was not heard ~T in the abdomen [No Edema] : there was no peripheral edema [No Palpable Aorta] : no palpable aorta [Declined Breast Exam] : declined breast exam  [Normal] : no posterior cervical lymphadenopathy and no anterior cervical lymphadenopathy [No CVA Tenderness] : no CVA  tenderness [No Spinal Tenderness] : no spinal tenderness [Coordination Grossly Intact] : coordination grossly intact [No Focal Deficits] : no focal deficits [Normal Insight/Judgement] : insight and judgment were intact [de-identified] : 3/6 SYSTOLIC MURMUR  RSB , APEX  [de-identified] : NO SUSPICIOUS SKIN LESIONS

## 2021-11-10 NOTE — REVIEW OF SYSTEMS
[Insomnia] : insomnia [Anxiety] : anxiety [Depression] : depression [Negative] : Genitourinary [Suicidal] : not suicidal

## 2021-11-10 NOTE — HEALTH RISK ASSESSMENT
[0] : 2) Feeling down, depressed, or hopeless: Not at all (0) [PHQ-2 Negative - No further assessment needed] : PHQ-2 Negative - No further assessment needed [Patient reported mammogram was normal] : Patient reported mammogram was normal [Patient reported colonoscopy was normal] : Patient reported colonoscopy was normal [[id number]] : 0 [MammogramDate] : 5/2021 [PapSmearComments] : PELVIC 2021: NORMAL  [ColonoscopyDate] : 2020

## 2021-12-07 ENCOUNTER — TRANSCRIPTION ENCOUNTER (OUTPATIENT)
Age: 76
End: 2021-12-07

## 2021-12-07 LAB
25(OH)D3 SERPL-MCNC: 39.2 NG/ML
ALBUMIN SERPL ELPH-MCNC: 4.3 G/DL
ALP BLD-CCNC: 94 U/L
ALT SERPL-CCNC: 27 U/L
ANION GAP SERPL CALC-SCNC: 10 MMOL/L
AST SERPL-CCNC: 32 U/L
BASOPHILS # BLD AUTO: 0.01 K/UL
BASOPHILS NFR BLD AUTO: 0.1 %
BILIRUB SERPL-MCNC: 1.6 MG/DL
BUN SERPL-MCNC: 10 MG/DL
CALCIUM SERPL-MCNC: 10 MG/DL
CHLORIDE SERPL-SCNC: 97 MMOL/L
CHOLEST SERPL-MCNC: 221 MG/DL
CO2 SERPL-SCNC: 33 MMOL/L
CREAT SERPL-MCNC: 0.74 MG/DL
EOSINOPHIL # BLD AUTO: 0.4 K/UL
EOSINOPHIL NFR BLD AUTO: 5.9 %
ESTIMATED AVERAGE GLUCOSE: 120 MG/DL
GLUCOSE SERPL-MCNC: 90 MG/DL
HBA1C MFR BLD HPLC: 5.8 %
HCT VFR BLD CALC: 40.6 %
HDLC SERPL-MCNC: 48 MG/DL
HGB BLD-MCNC: 13.6 G/DL
IMM GRANULOCYTES NFR BLD AUTO: 0.1 %
LDLC SERPL CALC-MCNC: 133 MG/DL
LYMPHOCYTES # BLD AUTO: 1.32 K/UL
LYMPHOCYTES NFR BLD AUTO: 19.5 %
MAN DIFF?: NORMAL
MCHC RBC-ENTMCNC: 26.9 PG
MCHC RBC-ENTMCNC: 33.5 GM/DL
MCV RBC AUTO: 80.2 FL
MONOCYTES # BLD AUTO: 0.63 K/UL
MONOCYTES NFR BLD AUTO: 9.3 %
NEUTROPHILS # BLD AUTO: 4.4 K/UL
NEUTROPHILS NFR BLD AUTO: 65.1 %
NONHDLC SERPL-MCNC: 173 MG/DL
PLATELET # BLD AUTO: 221 K/UL
POTASSIUM SERPL-SCNC: 4.1 MMOL/L
PROT SERPL-MCNC: 7.1 G/DL
RBC # BLD: 5.06 M/UL
RBC # FLD: 13.7 %
SODIUM SERPL-SCNC: 139 MMOL/L
TRIGL SERPL-MCNC: 200 MG/DL
TSH SERPL-ACNC: 2.09 UIU/ML
WBC # FLD AUTO: 6.77 K/UL

## 2021-12-14 ENCOUNTER — APPOINTMENT (OUTPATIENT)
Dept: CARDIOLOGY | Facility: CLINIC | Age: 76
End: 2021-12-14
Payer: MEDICARE

## 2021-12-14 ENCOUNTER — NON-APPOINTMENT (OUTPATIENT)
Age: 76
End: 2021-12-14

## 2021-12-14 VITALS
SYSTOLIC BLOOD PRESSURE: 144 MMHG | BODY MASS INDEX: 26.29 KG/M2 | OXYGEN SATURATION: 95 % | WEIGHT: 154 LBS | HEART RATE: 80 BPM | DIASTOLIC BLOOD PRESSURE: 81 MMHG | HEIGHT: 64 IN

## 2021-12-14 DIAGNOSIS — I49.1 ATRIAL PREMATURE DEPOLARIZATION: ICD-10-CM

## 2021-12-14 PROCEDURE — 99215 OFFICE O/P EST HI 40 MIN: CPT

## 2021-12-14 PROCEDURE — 93000 ELECTROCARDIOGRAM COMPLETE: CPT

## 2021-12-27 ENCOUNTER — RX RENEWAL (OUTPATIENT)
Age: 76
End: 2021-12-27

## 2022-02-15 ENCOUNTER — APPOINTMENT (OUTPATIENT)
Dept: INTERNAL MEDICINE | Facility: CLINIC | Age: 77
End: 2022-02-15
Payer: MEDICARE

## 2022-02-15 VITALS
DIASTOLIC BLOOD PRESSURE: 94 MMHG | WEIGHT: 154 LBS | OXYGEN SATURATION: 98 % | BODY MASS INDEX: 26.43 KG/M2 | SYSTOLIC BLOOD PRESSURE: 145 MMHG | HEART RATE: 82 BPM

## 2022-02-15 VITALS — SYSTOLIC BLOOD PRESSURE: 122 MMHG | DIASTOLIC BLOOD PRESSURE: 78 MMHG

## 2022-02-15 DIAGNOSIS — F41.8 OTHER SPECIFIED ANXIETY DISORDERS: ICD-10-CM

## 2022-02-15 PROCEDURE — 99214 OFFICE O/P EST MOD 30 MIN: CPT

## 2022-02-15 NOTE — PHYSICAL EXAM
[No Acute Distress] : no acute distress [Normal Sclera/Conjunctiva] : normal sclera/conjunctiva [No Carotid Bruits] : no carotid bruits [No Edema] : there was no peripheral edema [Normal] : soft, non-tender, non-distended, no masses palpated, no HSM and normal bowel sounds [Normal Supraclavicular Nodes] : no supraclavicular lymphadenopathy [Normal Posterior Cervical Nodes] : no posterior cervical lymphadenopathy [Normal Anterior Cervical Nodes] : no anterior cervical lymphadenopathy [No CVA Tenderness] : no CVA  tenderness [Normal S1, S2] : normal S1 and S2 [Normal Affect] : the affect was normal [Alert and Oriented x3] : oriented to person, place, and time [Normal Mood] : the mood was normal [Normal Insight/Judgement] : insight and judgment were intact [de-identified] : HARSH 4/6 HEART MURMUR RSB

## 2022-02-15 NOTE — HISTORY OF PRESENT ILLNESS
[FreeTextEntry1] : routine follow up [de-identified] : 1. HTN \par 2. HLD \par 3. PRE DIABETES \par 4. ANXIETY/ DEPRESSION \par FEELS WELL , JUST RETURNED FROM THE [facility] \par 5. CARDIAC MURMUR : SAW CARDIO RECENTLY \par DENEIS ANY CHEST PAINS , SOB

## 2022-03-27 DIAGNOSIS — U07.1 COVID-19: ICD-10-CM

## 2022-05-11 ENCOUNTER — APPOINTMENT (OUTPATIENT)
Dept: INTERNAL MEDICINE | Facility: CLINIC | Age: 77
End: 2022-05-11
Payer: MEDICARE

## 2022-05-11 VITALS
SYSTOLIC BLOOD PRESSURE: 158 MMHG | BODY MASS INDEX: 26.43 KG/M2 | HEART RATE: 73 BPM | OXYGEN SATURATION: 96 % | DIASTOLIC BLOOD PRESSURE: 90 MMHG | WEIGHT: 154 LBS

## 2022-05-11 VITALS — DIASTOLIC BLOOD PRESSURE: 80 MMHG | SYSTOLIC BLOOD PRESSURE: 128 MMHG

## 2022-05-11 PROCEDURE — 99214 OFFICE O/P EST MOD 30 MIN: CPT | Mod: 25

## 2022-05-11 PROCEDURE — 36415 COLL VENOUS BLD VENIPUNCTURE: CPT

## 2022-06-06 ENCOUNTER — APPOINTMENT (OUTPATIENT)
Dept: CARDIOLOGY | Facility: CLINIC | Age: 77
End: 2022-06-06
Payer: MEDICARE

## 2022-06-06 ENCOUNTER — NON-APPOINTMENT (OUTPATIENT)
Age: 77
End: 2022-06-06

## 2022-06-06 VITALS
BODY MASS INDEX: 25.33 KG/M2 | DIASTOLIC BLOOD PRESSURE: 84 MMHG | HEART RATE: 75 BPM | OXYGEN SATURATION: 96 % | SYSTOLIC BLOOD PRESSURE: 147 MMHG | WEIGHT: 152 LBS | HEIGHT: 65 IN

## 2022-06-06 DIAGNOSIS — I65.29 OCCLUSION AND STENOSIS OF UNSPECIFIED CAROTID ARTERY: ICD-10-CM

## 2022-06-06 PROCEDURE — 99215 OFFICE O/P EST HI 40 MIN: CPT

## 2022-06-06 PROCEDURE — 93000 ELECTROCARDIOGRAM COMPLETE: CPT

## 2022-08-17 ENCOUNTER — APPOINTMENT (OUTPATIENT)
Dept: INTERNAL MEDICINE | Facility: CLINIC | Age: 77
End: 2022-08-17

## 2022-08-17 VITALS
RESPIRATION RATE: 12 BRPM | HEIGHT: 65 IN | SYSTOLIC BLOOD PRESSURE: 152 MMHG | WEIGHT: 156 LBS | BODY MASS INDEX: 25.99 KG/M2 | HEART RATE: 81 BPM | TEMPERATURE: 97.9 F | OXYGEN SATURATION: 95 % | DIASTOLIC BLOOD PRESSURE: 83 MMHG

## 2022-08-17 VITALS — SYSTOLIC BLOOD PRESSURE: 138 MMHG | DIASTOLIC BLOOD PRESSURE: 80 MMHG

## 2022-08-17 DIAGNOSIS — L50.9 URTICARIA, UNSPECIFIED: ICD-10-CM

## 2022-08-17 PROCEDURE — 99214 OFFICE O/P EST MOD 30 MIN: CPT | Mod: 25

## 2022-08-17 PROCEDURE — 36415 COLL VENOUS BLD VENIPUNCTURE: CPT

## 2022-08-17 RX ORDER — CETIRIZINE HYDROCHLORIDE 10 MG/1
10 TABLET, FILM COATED ORAL DAILY
Qty: 30 | Refills: 0 | Status: ACTIVE | COMMUNITY
Start: 2022-08-17 | End: 1900-01-01

## 2022-08-18 LAB
ALBUMIN SERPL ELPH-MCNC: 4.7 G/DL
ALP BLD-CCNC: 89 U/L
ALT SERPL-CCNC: 32 U/L
ANION GAP SERPL CALC-SCNC: 15 MMOL/L
AST SERPL-CCNC: 26 U/L
BILIRUB SERPL-MCNC: 2.5 MG/DL
BUN SERPL-MCNC: 14 MG/DL
CALCIUM SERPL-MCNC: 10.2 MG/DL
CHLORIDE SERPL-SCNC: 96 MMOL/L
CHOLEST SERPL-MCNC: 252 MG/DL
CO2 SERPL-SCNC: 28 MMOL/L
CREAT SERPL-MCNC: 0.8 MG/DL
EGFR: 76 ML/MIN/1.73M2
ESTIMATED AVERAGE GLUCOSE: 120 MG/DL
GLUCOSE SERPL-MCNC: 79 MG/DL
GLUCOSE SERPL-MCNC: 85 MG/DL
HBA1C MFR BLD HPLC: 5.8 %
HDLC SERPL-MCNC: 72 MG/DL
LDLC SERPL CALC-MCNC: 132 MG/DL
NONHDLC SERPL-MCNC: 181 MG/DL
POTASSIUM SERPL-SCNC: 3.9 MMOL/L
PROT SERPL-MCNC: 7.1 G/DL
SODIUM SERPL-SCNC: 139 MMOL/L
TRIGL SERPL-MCNC: 243 MG/DL

## 2022-09-01 ENCOUNTER — APPOINTMENT (OUTPATIENT)
Dept: INTERNAL MEDICINE | Facility: CLINIC | Age: 77
End: 2022-09-01

## 2022-09-01 VITALS
SYSTOLIC BLOOD PRESSURE: 158 MMHG | RESPIRATION RATE: 12 BRPM | HEIGHT: 65 IN | HEART RATE: 91 BPM | WEIGHT: 156 LBS | OXYGEN SATURATION: 96 % | DIASTOLIC BLOOD PRESSURE: 88 MMHG | BODY MASS INDEX: 25.99 KG/M2

## 2022-09-01 VITALS — SYSTOLIC BLOOD PRESSURE: 130 MMHG | DIASTOLIC BLOOD PRESSURE: 80 MMHG

## 2022-09-01 PROCEDURE — 99213 OFFICE O/P EST LOW 20 MIN: CPT | Mod: 25

## 2022-09-01 PROCEDURE — 36415 COLL VENOUS BLD VENIPUNCTURE: CPT

## 2022-09-02 LAB
ALBUMIN SERPL ELPH-MCNC: 4.5 G/DL
ALP BLD-CCNC: 103 U/L
ALT SERPL-CCNC: 33 U/L
AST SERPL-CCNC: 31 U/L
BILIRUB DIRECT SERPL-MCNC: 0.3 MG/DL
BILIRUB INDIRECT SERPL-MCNC: 1.4 MG/DL
BILIRUB SERPL-MCNC: 1.7 MG/DL
PROT SERPL-MCNC: 6.5 G/DL

## 2022-09-19 ENCOUNTER — APPOINTMENT (OUTPATIENT)
Dept: CARDIOLOGY | Facility: CLINIC | Age: 77
End: 2022-09-19

## 2022-09-23 ENCOUNTER — NON-APPOINTMENT (OUTPATIENT)
Age: 77
End: 2022-09-23

## 2022-09-23 ENCOUNTER — APPOINTMENT (OUTPATIENT)
Dept: CARDIOLOGY | Facility: CLINIC | Age: 77
End: 2022-09-23

## 2022-09-23 VITALS
SYSTOLIC BLOOD PRESSURE: 162 MMHG | WEIGHT: 160 LBS | HEIGHT: 65 IN | HEART RATE: 73 BPM | BODY MASS INDEX: 26.66 KG/M2 | DIASTOLIC BLOOD PRESSURE: 87 MMHG | OXYGEN SATURATION: 97 %

## 2022-09-23 VITALS — DIASTOLIC BLOOD PRESSURE: 82 MMHG | SYSTOLIC BLOOD PRESSURE: 150 MMHG

## 2022-09-23 PROCEDURE — 93000 ELECTROCARDIOGRAM COMPLETE: CPT

## 2022-09-23 PROCEDURE — 99214 OFFICE O/P EST MOD 30 MIN: CPT

## 2022-09-23 NOTE — DISCUSSION/SUMMARY
[FreeTextEntry1] : Mrs. Rodríguez has been doing well from a cardiac symptomatic standpoint since her previous visit here on 6/6/22 with Dr Villalobos.\par . Specifically, she has not experienced recurrence of vagally-mediated presyncope or syncope. She does not describe having experienced any signs or symptoms to suggest the development of an anginal syndrome, congestive heart failure, or a hemodynamically-compromising arrhythmia. \par Her electrocardiogram today reveals sinus rhythm with nonspecific poor R wave progression in leads V1-V3 and nonspecific ST-T wave abnormalities, essentially unchanged from her previous office tracing, allowing for lead placement variation. \par  Her cardiac examination today is remarkable for a grade III/VI harsh systolic ejection murmur, compatible with severe aortic stenosis, and unchanged from her previous visit with me. Her blood pressure reading initially was 162/87 and by the end of the visit, dropped to 150/82.\par  I have instructed her to continue Norvasc 10 mg QD.\par I have instructed her to continue the present dosages of Crestor and Zetia.\par \par She is scheduled for a colonoscopy on October 12, 2022.  I have asked her to please have her carotid artery scan as well as her echocardiogram performed prior to the colonoscopy.  I will call her with the results of those studies.  Pending the results of the echocardiogram and carotid artery scan, I will clear her for her planned procedure.  If all looks well, no further cardiac testing would be necessary.  Routine hemodynamic monitoring is recommended.\par \par I have emphasised  the implications of aortic stenosis with the patient today, including the natural history.\par  I alerted her to the potential signs and symptoms of progressive aortic stenosis, including dyspnea on exertion, chest discomfort, and post-exertional presyncope/syncope. I have asked her to call me if she should experience any of these symptoms.\par \par The importance of proper dietary habits, weight loss, and continued regular exercise was again discussed with the patient today.\par \par I have asked the patient to call me if she should have a questions or problems pertaining to these matters, and especially if she should experience any concerning symptoms. I have otherwise asked the patient to return to the office for follow-up cardiac evaluation and blood pressure reassessmentwith Dr Villalobos in 6 months, [EKG obtained to assist in diagnosis and management of assessed problem(s)] : EKG obtained to assist in diagnosis and management of assessed problem(s)

## 2022-09-23 NOTE — CARDIOLOGY SUMMARY
[de-identified] : 9/23/22 -sinus rhythm at a rate of 66 bpm.  Nonspecific poor R wave progression in leads V1-V3.  Nonspecific ST-T wave abnormalities.

## 2022-09-23 NOTE — REVIEW OF SYSTEMS
[Negative] : Heme/Lymph [FreeTextEntry5] : See HPI [FreeTextEntry7] : See HPI [FreeTextEntry9] : HPI

## 2022-09-23 NOTE — HISTORY OF PRESENT ILLNESS
[FreeTextEntry1] : Mrs. Shauna Rodríguez presented to the office today for follow-up cardiac evaluation. She was last evaluated in the office by Dr Villalobos on 6/6/22.\par \par The patient is a 76-year-old female with a history of vagally-mediated presyncope/syncope, ventricular ectopy, severe aortic stenosis, moderate carotid atherosclerosis, hypertension, a dyslipidemia, pre-diabetes, osteopenia, GERD, and a malignant colon polyp (status post right hemicolectomy 6/17/16).\par \par The patient has been doing well from a cardiac symptomatic standpoint since her previous visit here on 6/6/22.. Specifically, she has not experienced recurrence of vagally-mediated presyncope or syncope. She has not experienced chest discomfort or dyspnea on exertion in association with her activities. She has not noted orthopnea, paroxysmal nocturnal dyspnea, or lower extremity edema. She has not experienced any episodes of palpitations.\par \par \par \par The patient was discovered as having a sessile cecal polyp on a routine follow-up colonoscopy performed on 4/15/16 (noting that she has a history of colonic polyps), with the biopsy revealing this polyp to be adenocarcinoma. She subsequently underwent laparoscopic right hemicolectomy on 6/7/16, without any associated complications. She most recently underwent follow-up surveillance colonoscopy on 11/16/2021, during which 2 polyps were resected, with a follow-up surveillance colonoscopy planned within 10 months.\par \par She is now scheduled for a follow-up colonoscopy on October 12, 2022.\par \par Echocardiography most recently performed on 10/6/2021 revealed the left atrium to be mildly dilated.  The remainder of the cardiac chambers were normal in dimension.  Mild concentric left ventricular hypertrophy was identified.  Left ventricular systolic function was normal, with an estimated ejection fraction of 60%.  Severe aortic stenosis (peak gradient 112 mmHg and mean gradient 72 mmHg) was demonstrated.  Mild mitral regurgitation and mild tricuspid regurgitation were demonstrated, without evidence of pulmonary hypertension.\par \par Carotid artery Doppler testing most recently performed on 5/7/19 revealed mild plaque formation involving the common carotid arteries, the bulbar regions, and the proximal portions of the internal carotid arteries bilaterally, representing no significant change when compared with a previous study performed on 10/7/17.\par \par Laboratory studies performed on 5/4/2022 revealed cholesterol 234, triglycerides 286, HDL 43, and calculated .  The bilirubin was mildly elevated at 1.3.  The AST and ALT were mildly elevated at 44 and 47, respectively.  The alkaline phosphatase level was normal.  The BUN and creatinine were 14 and 0.88, respectively.  The potassium level was 4.4.  The glucose level was 94 and the hemoglobin A1c level was 5.6%.\par \par As far as risk factors for coronary artery disease are concerned, the patient has a history of a dyslipidemia, pre-diabetes, and hypertension. She discontinued cigarette smoking approximately 24 years ago, having previously smoked one to one and a half packs per day for a period of 20 years. Her family history is unknown, as she was raised in a foster home.\par \par

## 2022-10-06 ENCOUNTER — MED ADMIN CHARGE (OUTPATIENT)
Age: 77
End: 2022-10-06

## 2022-10-06 ENCOUNTER — APPOINTMENT (OUTPATIENT)
Dept: CARDIOLOGY | Facility: CLINIC | Age: 77
End: 2022-10-06

## 2022-10-06 PROCEDURE — 93306 TTE W/DOPPLER COMPLETE: CPT

## 2022-10-06 PROCEDURE — 93880 EXTRACRANIAL BILAT STUDY: CPT

## 2022-10-06 RX ORDER — PERFLUTREN 6.52 MG/ML
6.52 INJECTION, SUSPENSION INTRAVENOUS
Qty: 2 | Refills: 0 | Status: COMPLETED | OUTPATIENT
Start: 2022-10-06

## 2022-10-06 RX ADMIN — PERFLUTREN MG/ML: 6.52 INJECTION, SUSPENSION INTRAVENOUS at 00:00

## 2022-10-12 ENCOUNTER — NON-APPOINTMENT (OUTPATIENT)
Age: 77
End: 2022-10-12

## 2022-10-14 ENCOUNTER — NON-APPOINTMENT (OUTPATIENT)
Age: 77
End: 2022-10-14

## 2022-11-20 ENCOUNTER — RX RENEWAL (OUTPATIENT)
Age: 77
End: 2022-11-20

## 2022-11-20 RX ORDER — ESCITALOPRAM OXALATE 20 MG/1
20 TABLET ORAL DAILY
Qty: 90 | Refills: 0 | Status: ACTIVE | COMMUNITY
Start: 2021-04-15 | End: 1900-01-01

## 2022-11-28 ENCOUNTER — EMERGENCY (EMERGENCY)
Facility: HOSPITAL | Age: 77
LOS: 1 days | Discharge: HOME | End: 2022-11-28
Attending: EMERGENCY MEDICINE | Admitting: EMERGENCY MEDICINE
Payer: COMMERCIAL

## 2022-11-28 VITALS
HEART RATE: 91 BPM | RESPIRATION RATE: 18 BRPM | TEMPERATURE: 98 F | DIASTOLIC BLOOD PRESSURE: 77 MMHG | SYSTOLIC BLOOD PRESSURE: 125 MMHG | WEIGHT: 158.07 LBS | HEIGHT: 64 IN | OXYGEN SATURATION: 94 %

## 2022-11-28 DIAGNOSIS — Z98.51 TUBAL LIGATION STATUS: Chronic | ICD-10-CM

## 2022-11-28 PROCEDURE — 72131 CT LUMBAR SPINE W/O DYE: CPT | Mod: 26,QQ

## 2022-11-28 PROCEDURE — 72131 CT LUMBAR SPINE W/O DYE: CPT | Mod: QQ

## 2022-11-28 PROCEDURE — 70450 CT HEAD/BRAIN W/O DYE: CPT | Mod: 26,MA

## 2022-11-28 PROCEDURE — 99285 EMERGENCY DEPT VISIT HI MDM: CPT

## 2022-11-28 PROCEDURE — 70450 CT HEAD/BRAIN W/O DYE: CPT | Mod: MA

## 2022-11-28 PROCEDURE — 99284 EMERGENCY DEPT VISIT MOD MDM: CPT | Mod: 25

## 2022-11-28 RX ORDER — ACETAMINOPHEN 500 MG
650 TABLET ORAL ONCE
Refills: 0 | Status: COMPLETED | OUTPATIENT
Start: 2022-11-28 | End: 2022-11-28

## 2022-11-28 RX ADMIN — Medication 650 MILLIGRAM(S): at 18:29
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
## 2022-11-28 NOTE — ED PROVIDER NOTE - PROGRESS NOTE DETAILS
pt asymptomatic, ambulating in ED without difficulty. adamantly requesting dc at this time. NVI. Discussed the results of all diagnostic testing in ED and copies of all reports given.   Pt was given an opportunity to have all questions answered to satisfaction.  Discussed the importance of prompt, close medical follow-up. ED return precautions discussed at length.  Pt verbalizes agreement and understanding of plan and ED return precautions. Pt well appearing, stable for DC home. No emergent concerns at this time.

## 2022-11-28 NOTE — ED PROVIDER NOTE - NS ED ATTENDING STATEMENT MOD
This was a shared visit with the EFREN. I reviewed and verified the documentation and independently performed the documented:

## 2022-11-28 NOTE — ED ADULT NURSE NOTE - CHPI ED NUR SYMPTOMS NEG
no acting out behaviors/no bruising/no crying/no decreased eating/drinking/no difficulty bearing weight/no disorientation/no dizziness/no fussiness/no laceration/no loss of consciousness/no neck tenderness/no sleeping issues
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
## 2022-11-28 NOTE — ED PROVIDER NOTE - CPE EDP SKIN NORM
Airway  Performed by: Stephanie Underwood CRNA  Authorized by: Dm Dejesus DO     Final Airway Type:  Supraglottic airway  Final Supraglottic Airway:  Air-Q  SGA Size*:  3.5  Attempts*:  1   Patient Identified, Procedure confirmed, Emergency equipment available and Safety protocols followed  Location:  OR  Urgency:  Elective  Difficult Airway: No    Indications for Airway Management:  Anesthesia  Sedation Level:  Deep  Mask Difficulty Assessment:  1 - vent by mask  Anesthesiologist:  Dm Dejesus DO  CRNA:  Stephanie Underwood CRNA         normal...

## 2022-11-28 NOTE — ED PROVIDER NOTE - ENMT, MLM
Head NCAT, Airway patent, Nasal mucosa clear. Mouth with normal mucosa. Throat has no vesicles, no oropharyngeal exudates and uvula is midline.
N/A

## 2022-11-28 NOTE — ED ADULT NURSE NOTE - SKIN INTEGRITY
Writer contacted Jacqueline, to advise her that Dr. Velazquez wanted to put her on a new antibiotic,called Keflex, since her toe is still bothering her, with swelling and redness; no fever.  Message left on voicemail that Rx went to [facility].    
intact

## 2022-11-28 NOTE — ED PROVIDER NOTE - PATIENT PORTAL LINK FT
You can access the FollowMyHealth Patient Portal offered by [facility] by registering at the following website: [email or web address]. By joining [facility]’s FollowMyHealth portal, you will also be able to view your health information using other applications (apps) compatible with our system.
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
## 2022-12-02 ENCOUNTER — OUTPATIENT (OUTPATIENT)
Dept: OUTPATIENT SERVICES | Facility: HOSPITAL | Age: 77
LOS: 1 days | End: 2022-12-02
Payer: MEDICARE

## 2022-12-02 ENCOUNTER — TRANSCRIPTION ENCOUNTER (OUTPATIENT)
Age: 77
End: 2022-12-02

## 2022-12-02 ENCOUNTER — RESULT REVIEW (OUTPATIENT)
Age: 77
End: 2022-12-02

## 2022-12-02 VITALS
TEMPERATURE: 98 F | DIASTOLIC BLOOD PRESSURE: 71 MMHG | HEIGHT: 64 IN | WEIGHT: 158.07 LBS | SYSTOLIC BLOOD PRESSURE: 128 MMHG | RESPIRATION RATE: 20 BRPM | OXYGEN SATURATION: 96 % | HEART RATE: 76 BPM

## 2022-12-02 VITALS
OXYGEN SATURATION: 100 % | SYSTOLIC BLOOD PRESSURE: 123 MMHG | RESPIRATION RATE: 17 BRPM | HEART RATE: 70 BPM | DIASTOLIC BLOOD PRESSURE: 65 MMHG

## 2022-12-02 DIAGNOSIS — Z86.010 PERSONAL HISTORY OF COLONIC POLYPS: ICD-10-CM

## 2022-12-02 DIAGNOSIS — Z98.51 TUBAL LIGATION STATUS: Chronic | ICD-10-CM

## 2022-12-02 DIAGNOSIS — Z12.11 ENCOUNTER FOR SCREENING FOR MALIGNANT NEOPLASM OF COLON: ICD-10-CM

## 2022-12-02 PROCEDURE — 88305 TISSUE EXAM BY PATHOLOGIST: CPT | Mod: 26

## 2022-12-02 PROCEDURE — 88305 TISSUE EXAM BY PATHOLOGIST: CPT

## 2022-12-02 PROCEDURE — 43239 EGD BIOPSY SINGLE/MULTIPLE: CPT

## 2022-12-02 PROCEDURE — 45380 COLONOSCOPY AND BIOPSY: CPT

## 2022-12-02 RX ORDER — PANTOPRAZOLE SODIUM 20 MG/1
1 TABLET, DELAYED RELEASE ORAL
Qty: 30 | Refills: 1
Start: 2022-12-02 | End: 2023-01-30

## 2022-12-02 RX ORDER — SODIUM CHLORIDE 9 MG/ML
500 INJECTION INTRAMUSCULAR; INTRAVENOUS; SUBCUTANEOUS
Refills: 0 | Status: DISCONTINUED | OUTPATIENT
Start: 2022-12-02 | End: 2022-12-16

## 2022-12-02 NOTE — PRE PROCEDURE NOTE - PRE PROCEDURE EVALUATION
Attending Physician: Mira                           Procedure: EGD and colonoscopy    Indication for Procedure: hx of malignancy  ________________________________________________________  PAST MEDICAL & SURGICAL HISTORY:  AS (aortic stenosis)  moderate ( ECHO 2013)  &quot;asymptomatic&quot;      HTN (hypertension)      HLD (hyperlipidemia)      Dry eyes      Colon cancer  adenocarcinoma      Acid reflux      OP (osteoporosis)      AV block      Syncope  last 2012      Carotid artery disease  on follow up      H/O tubal ligation        ALLERGIES:  Demerol HCl (Vomiting)    HOME MEDICATIONS:  acetaminophen-oxyCODONE 325 mg-5 mg oral tablet: 1 tab(s) orally every 4 hours, As needed, Moderate Pain  amLODIPine 10 mg oral tablet: 1 tab(s) orally once a day  Calcium 600+D oral tablet: 1 tab(s) orally 2 times a day  Fish Oil: 1300 milligram(s) orally once a day  hydrochlorothiazide 25 mg oral tablet: 1 tab(s) orally once a day  magnesium: 400 milligram(s) orally once a day  PriLOSEC 20 mg oral delayed release capsule: 1 cap(s) orally once a day  simvastatin 40 mg oral tablet: 1 tab(s) orally once a day (at bedtime)  Zetia 10 mg oral tablet: 1 tab(s) orally once a day    AICD/PPM: [ ] yes   [x] no    PERTINENT LAB DATA:                      PHYSICAL EXAMINATION:    Height (cm): 162.6  Weight (kg): 71.7  BMI (kg/m2): 27.1  BSA (m2): 1.77T(C): 36.7  HR: 76  BP: 128/71  RR: 20  SpO2: 96%    Constitutional: NAD  HEENT: PERRLA, EOMI,    Neck:  No JVD  Respiratory: CTAB/L  Cardiovascular: S1 and S2  Gastrointestinal: BS+, soft, NT/ND  Extremities: No peripheral edema  Neurological: A/O x 3, no focal deficits  Psychiatric: Normal mood, normal affect  Skin: No rashes    ASA Class: I [ ]  II [ ]  III [ ]  IV [ ] per anesthesia     COMMENTS:    The patient is a suitable candidate for the planned procedure unless box checked [ ]  No, explain:

## 2022-12-02 NOTE — ASU DISCHARGE PLAN (ADULT/PEDIATRIC) - CARE PROVIDER_API CALL
Obi Meyers)  Gastroenterology; Internal Medicine  [address] [facility], [address] [place] [address]  Phone: [phone]  Fax: [phone]  Follow Up Time:

## 2022-12-02 NOTE — ASU DISCHARGE PLAN (ADULT/PEDIATRIC) - ASU DC SPECIAL INSTRUCTIONSFT
Please call the office in 2 weeks for pathology results. Please call the office in 2 weeks for pathology results. Discontinue taking Prilosec. Take Protonix 40mg tab once daily. A prescription has been sent to your pharmacy. Zyclara Counseling:  I discussed with the patient the risks of imiquimod including but not limited to erythema, scaling, itching, weeping, crusting, and pain.  Patient understands that the inflammatory response to imiquimod is variable from person to person and was educated regarded proper titration schedule.  If flu-like symptoms develop, patient knows to discontinue the medication and contact us.

## 2022-12-02 NOTE — ASU DISCHARGE PLAN (ADULT/PEDIATRIC) - NS MD DC FALL RISK RISK
For information on Fall & Injury Prevention, visit: [email or web address] OR  [email or web address] OR  https://www.cdc.gov/steadi/patient.html

## 2022-12-02 NOTE — ASU PATIENT PROFILE, ADULT - NSICDXPASTMEDICALHX_GEN_ALL_CORE_FT
PAST MEDICAL HISTORY:  Acid reflux     AS (aortic stenosis) moderate ( ECHO 2013)  "asymptomatic"    AV block     Carotid artery disease on follow up    Colon cancer adenocarcinoma    Dry eyes     HLD (hyperlipidemia)     HTN (hypertension)     OP (osteoporosis)     Syncope last 2012

## 2022-12-06 LAB — SURGICAL PATHOLOGY STUDY: SIGNIFICANT CHANGE UP

## 2022-12-28 ENCOUNTER — RX RENEWAL (OUTPATIENT)
Age: 77
End: 2022-12-28

## 2023-02-06 ENCOUNTER — NON-APPOINTMENT (OUTPATIENT)
Age: 78
End: 2023-02-06

## 2023-02-06 ENCOUNTER — APPOINTMENT (OUTPATIENT)
Dept: CARDIOLOGY | Facility: CLINIC | Age: 78
End: 2023-02-06
Payer: MEDICARE

## 2023-02-06 ENCOUNTER — RX RENEWAL (OUTPATIENT)
Age: 78
End: 2023-02-06

## 2023-02-06 VITALS
WEIGHT: 160 LBS | HEIGHT: 65 IN | DIASTOLIC BLOOD PRESSURE: 96 MMHG | BODY MASS INDEX: 26.66 KG/M2 | SYSTOLIC BLOOD PRESSURE: 170 MMHG | OXYGEN SATURATION: 98 % | HEART RATE: 76 BPM

## 2023-02-06 DIAGNOSIS — R03.0 ELEVATED BLOOD-PRESSURE READING, W/OUT DIAGNOSIS OF HYPERTENSION: ICD-10-CM

## 2023-02-06 PROCEDURE — 93000 ELECTROCARDIOGRAM COMPLETE: CPT

## 2023-02-06 PROCEDURE — 99215 OFFICE O/P EST HI 40 MIN: CPT

## 2023-02-06 RX ORDER — PANTOPRAZOLE 40 MG/1
40 TABLET, DELAYED RELEASE ORAL
Qty: 30 | Refills: 0 | Status: ACTIVE | COMMUNITY
Start: 2023-01-04

## 2023-02-09 ENCOUNTER — APPOINTMENT (OUTPATIENT)
Dept: CARDIOLOGY | Facility: CLINIC | Age: 78
End: 2023-02-09
Payer: MEDICARE

## 2023-02-09 PROCEDURE — 93790 AMBL BP MNTR W/SW I&R: CPT

## 2023-02-13 RX ORDER — NIRMATRELVIR AND RITONAVIR 300-100 MG
20 X 150 MG & KIT ORAL
Qty: 20 | Refills: 0 | Status: DISCONTINUED | COMMUNITY
Start: 2022-11-18 | End: 2023-02-13

## 2023-07-13 ENCOUNTER — RX RENEWAL (OUTPATIENT)
Age: 78
End: 2023-07-13

## 2023-07-26 DIAGNOSIS — M85.80 OTHER SPECIFIED DISORDERS OF BONE DENSITY AND STRUCTURE, UNSPECIFIED SITE: ICD-10-CM

## 2023-07-28 ENCOUNTER — LABORATORY RESULT (OUTPATIENT)
Age: 78
End: 2023-07-28

## 2023-08-02 ENCOUNTER — APPOINTMENT (OUTPATIENT)
Dept: INTERNAL MEDICINE | Facility: CLINIC | Age: 78
End: 2023-08-02
Payer: MEDICARE

## 2023-08-02 VITALS — DIASTOLIC BLOOD PRESSURE: 78 MMHG | SYSTOLIC BLOOD PRESSURE: 124 MMHG

## 2023-08-02 VITALS
OXYGEN SATURATION: 96 % | DIASTOLIC BLOOD PRESSURE: 77 MMHG | HEART RATE: 83 BPM | BODY MASS INDEX: 26.66 KG/M2 | WEIGHT: 160 LBS | HEIGHT: 65 IN | SYSTOLIC BLOOD PRESSURE: 143 MMHG

## 2023-08-02 DIAGNOSIS — C18.9 MALIGNANT NEOPLASM OF COLON, UNSPECIFIED: ICD-10-CM

## 2023-08-02 DIAGNOSIS — Z00.00 ENCOUNTER FOR GENERAL ADULT MEDICAL EXAMINATION W/OUT ABNORMAL FINDINGS: ICD-10-CM

## 2023-08-02 PROCEDURE — 93000 ELECTROCARDIOGRAM COMPLETE: CPT | Mod: 59

## 2023-08-02 PROCEDURE — G0439: CPT

## 2023-08-02 PROCEDURE — 99213 OFFICE O/P EST LOW 20 MIN: CPT | Mod: 25

## 2023-08-02 PROCEDURE — G0444 DEPRESSION SCREEN ANNUAL: CPT | Mod: 59

## 2023-08-02 NOTE — HISTORY OF PRESENT ILLNESS
[de-identified] : 1. HTN 2. HLD.  3. PRE DIABETES  FEELS WELL , DENIES ANY CHEST PAINS SOB, PALPITATIONS, BOWEL CHANGES

## 2023-08-02 NOTE — PHYSICAL EXAM
[No Acute Distress] : no acute distress [Well Nourished] : well nourished [Normal Sclera/Conjunctiva] : normal sclera/conjunctiva [PERRL] : pupils equal round and reactive to light [EOMI] : extraocular movements intact [Normal Outer Ear/Nose] : the outer ears and nose were normal in appearance [Normal Oropharynx] : the oropharynx was normal [No Lymphadenopathy] : no lymphadenopathy [Thyroid Normal, No Nodules] : the thyroid was normal and there were no nodules present [Regular Rhythm] : with a regular rhythm [Normal S1, S2] : normal S1 and S2 [No Carotid Bruits] : no carotid bruits [No Abdominal Bruit] : a ~M bruit was not heard ~T in the abdomen [No Varicosities] : no varicosities [No Edema] : there was no peripheral edema [No Palpable Aorta] : no palpable aorta [Declined Breast Exam] : declined breast exam  [Normal] : no posterior cervical lymphadenopathy and no anterior cervical lymphadenopathy [No CVA Tenderness] : no CVA  tenderness [No Spinal Tenderness] : no spinal tenderness [No Rash] : no rash [No Focal Deficits] : no focal deficits [Normal Gait] : normal gait [Normal Affect] : the affect was normal [Normal Insight/Judgement] : insight and judgment were intact [de-identified] : 4/6 SYSTOLIC MURMUR RSB [de-identified] : BY GYN

## 2023-08-07 ENCOUNTER — APPOINTMENT (OUTPATIENT)
Dept: CARDIOLOGY | Facility: CLINIC | Age: 78
End: 2023-08-07
Payer: MEDICARE

## 2023-08-07 VITALS
DIASTOLIC BLOOD PRESSURE: 86 MMHG | HEART RATE: 78 BPM | HEIGHT: 65 IN | OXYGEN SATURATION: 94 % | SYSTOLIC BLOOD PRESSURE: 139 MMHG | WEIGHT: 163 LBS | BODY MASS INDEX: 27.16 KG/M2

## 2023-08-07 DIAGNOSIS — R94.31 ABNORMAL ELECTROCARDIOGRAM [ECG] [EKG]: ICD-10-CM

## 2023-08-07 DIAGNOSIS — E78.5 HYPERLIPIDEMIA, UNSPECIFIED: ICD-10-CM

## 2023-08-07 PROCEDURE — 99215 OFFICE O/P EST HI 40 MIN: CPT

## 2023-08-07 PROCEDURE — 93000 ELECTROCARDIOGRAM COMPLETE: CPT

## 2023-08-07 NOTE — CARDIOLOGY SUMMARY
[de-identified] : 8/7/23 -sinus rhythm at a rate of 78 bpm.  Nonspecific poor R wave progression in leads V1-V3.  Nonspecific ST-T wave abnormalities.
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
## 2023-08-07 NOTE — PHYSICAL EXAM
[General Appearance - In No Acute Distress] : no acute distress [Normal Conjunctiva] : the conjunctiva exhibited no abnormalities [No Oral Pallor] : no oral pallor [Respiration, Rhythm And Depth] : normal respiratory rhythm and effort [Auscultation Breath Sounds / Voice Sounds] : lungs were clear to auscultation bilaterally [Bowel Sounds] : normal bowel sounds [Abdomen Tenderness] : non-tender [Abnormal Walk] : normal gait [Nail Clubbing] : no clubbing of the fingernails [] : no rash [Oriented To Time, Place, And Person] : oriented to person, place, and time [Not Palpable] : not palpable [No Precordial Heave] : no precordial heave was noted [Normal Rate] : normal [Rhythm Regular] : regular [Normal S1] : normal S1 [S2 Single] : was not split [No Gallop] : no gallop heard [Harsh] : harsh [Carotids] : the murmur was transmitted to the carotid arteries [III] : a grade 3 [2+] : left 2+ [No Abnormalities] : the abdominal aorta was not enlarged and no bruit was heard [No Pitting Edema] : no pitting edema present [FreeTextEntry1] : no JVD is appreciated at a 45 angle [Apical Thrill] : no thrill palpable at the apex [Normal S2] : abnormal S2 [Click] : no click [Pericardial Rub] : no pericardial rub [Right Carotid Bruit] : no bruit heard over the right carotid [Left Carotid Bruit] : no bruit heard over the left carotid

## 2023-08-07 NOTE — DISCUSSION/SUMMARY
[FreeTextEntry1] : Mrs. Rodríguez has been doing well from a cardiac symptomatic standpoint since her previous visit here on 2/6/2023. Specifically, she has not experienced recurrence of vagally-mediated presyncope or syncope. She does not describe having experienced any signs or symptoms to suggest the development of an anginal syndrome, congestive heart failure, or a hemodynamically-compromising arrhythmia. Her electrocardiogram today reveals sinus rhythm with nonspecific poor R wave progression in leads V1-V3 and nonspecific ST-T wave abnormalities, essentially unchanged from her previous office tracing, allowing for lead placement variation.  Her cardiac examination today is remarkable for a grade III/VI harsh systolic ejection murmur, compatible with severe aortic stenosis, and unchanged from her previous visit with me. Her blood pressure reading today is satisfactory.  I have reviewed the findings of the 24-hour ambulatory blood pressure monitoring study of 2/9/2023 in detail with the patient today, and I have instructed her to continue her antihypertensive therapy as presently prescribed for the time being.  I have reviewed the findings of the blood test report of 7/29/2023 in detail with the patient today, and I have instructed her to continue the present dosages of Crestor and Zetia for the time being, and to be more diligent with taking these medications regularly. Unfortunately, the patient was unable to tolerate a higher dosage of Crestor previously.  I have pointed out to the patient that her triglyceride level was elevated on this blood test, indicating the need for further dietary modification and weight loss.  I have again discussed the option of injectable therapy (Repatha or Praluent) with the patient today as an alternative means of optimizing her cholesterol and LDL levels, however, she declines this option.  A follow-up fasting lipid profile will be planned in a few months time for reassessment.  I have reviewed the findings of the carotid artery Doppler study of 10/6/2022 and the echocardiogram of 10/6/2022 in detail with the patient today.  I am referring the patient for follow-up echocardiography at this point to reassess the degree of aortic stenosis.  She is going to make arrangements to have this study performed through our office, and I will telephone her to discuss the findings, once the study has been completed.  I have again discussed the implications of aortic stenosis with the patient today, including the natural history. I alerted her to the potential signs and symptoms of progressive aortic stenosis, including dyspnea on exertion, chest discomfort, post-exertional presyncope/syncope, and exertional fatigue. I have asked her to call me if she should experience any of these symptoms.  The importance of proper dietary habits, weight loss, and regular exercise as tolerated was again discussed with the patient today.  I have asked the patient to call me if she should have a questions or problems pertaining to these matters, and especially if she should experience any concerning symptoms. I have otherwise asked the patient to return to the office for follow-up cardiac evaluation and blood pressure reassessment in 6 months, provided she remains clinically stable in the interim, and the findings on the upcoming echocardiogram do not warrant sooner evaluation and/or treatment. [EKG obtained to assist in diagnosis and management of assessed problem(s)] : EKG obtained to assist in diagnosis and management of assessed problem(s)

## 2023-08-09 DIAGNOSIS — R82.90 UNSPECIFIED ABNORMAL FINDINGS IN URINE: ICD-10-CM

## 2023-08-15 LAB
ALBUMIN SERPL ELPH-MCNC: 4.4 G/DL
ALP BLD-CCNC: 103 U/L
ALT SERPL-CCNC: 26 U/L
ANION GAP SERPL CALC-SCNC: 13 MMOL/L
APPEARANCE: ABNORMAL
APPEARANCE: CLEAR
AST SERPL-CCNC: 24 U/L
BACTERIA UR CULT: NORMAL
BACTERIA: ABNORMAL /HPF
BACTERIA: ABNORMAL /HPF
BILIRUB SERPL-MCNC: 1.4 MG/DL
BILIRUBIN URINE: NEGATIVE
BILIRUBIN URINE: NEGATIVE
BLOOD URINE: NEGATIVE
BLOOD URINE: NEGATIVE
BUN SERPL-MCNC: 14 MG/DL
CALCIUM SERPL-MCNC: 9.3 MG/DL
CAST: 1 /LPF
CHLORIDE SERPL-SCNC: 100 MMOL/L
CHOLEST SERPL-MCNC: 294 MG/DL
CO2 SERPL-SCNC: 25 MMOL/L
COLOR: NORMAL
COLOR: NORMAL
CREAT SERPL-MCNC: 0.84 MG/DL
EGFR: 72 ML/MIN/1.73M2
EPITHELIAL CELLS: 27 /HPF
ESTIMATED AVERAGE GLUCOSE: 123 MG/DL
GLUCOSE QUALITATIVE U: NEGATIVE MG/DL
GLUCOSE QUALITATIVE U: NEGATIVE MG/DL
GLUCOSE SERPL-MCNC: 91 MG/DL
GLUCOSE SERPL-MCNC: 92 MG/DL
HBA1C MFR BLD HPLC: 5.9 %
HDLC SERPL-MCNC: 45 MG/DL
HYALINE CASTS: NORMAL
KETONES URINE: ABNORMAL MG/DL
KETONES URINE: ABNORMAL MG/DL
LDLC SERPL CALC-MCNC: 173 MG/DL
LEUKOCYTE ESTERASE URINE: ABNORMAL
LEUKOCYTE ESTERASE URINE: ABNORMAL
MICROSCOPIC-UA: NORMAL
MICROSCOPIC-UA: NORMAL
NITRITE URINE: NEGATIVE
NITRITE URINE: NEGATIVE
NONHDLC SERPL-MCNC: 249 MG/DL
PH URINE: 6
PH URINE: 7
POTASSIUM SERPL-SCNC: 4 MMOL/L
PROT SERPL-MCNC: 6.4 G/DL
PROTEIN URINE: 30 MG/DL
PROTEIN URINE: 30 MG/DL
RED BLOOD CELLS URINE: 2 /HPF
RED BLOOD CELLS URINE: 5 /HPF
REVIEW: NORMAL
SODIUM SERPL-SCNC: 138 MMOL/L
SPECIFIC GRAVITY URINE: 1.02
SPECIFIC GRAVITY URINE: 1.03
SQUAMOUS EPITHELIAL CELLS: PRESENT
TRIGL SERPL-MCNC: 387 MG/DL
UROBILINOGEN URINE: 1 MG/DL
UROBILINOGEN URINE: 1 MG/DL
WHITE BLOOD CELLS URINE: 1 /HPF
WHITE BLOOD CELLS URINE: 6 /HPF

## 2023-08-17 ENCOUNTER — APPOINTMENT (OUTPATIENT)
Dept: CARDIOLOGY | Facility: CLINIC | Age: 78
End: 2023-08-17
Payer: MEDICARE

## 2023-08-17 PROCEDURE — 93306 TTE W/DOPPLER COMPLETE: CPT

## 2023-09-06 ENCOUNTER — RX RENEWAL (OUTPATIENT)
Age: 78
End: 2023-09-06

## 2023-09-09 ENCOUNTER — RX RENEWAL (OUTPATIENT)
Age: 78
End: 2023-09-09

## 2023-09-20 ENCOUNTER — RX RENEWAL (OUTPATIENT)
Age: 78
End: 2023-09-20

## 2023-09-21 ENCOUNTER — RX RENEWAL (OUTPATIENT)
Age: 78
End: 2023-09-21

## 2023-09-25 ENCOUNTER — RX RENEWAL (OUTPATIENT)
Age: 78
End: 2023-09-25

## 2023-11-01 ENCOUNTER — RX RENEWAL (OUTPATIENT)
Age: 78
End: 2023-11-01

## 2023-11-01 RX ORDER — EZETIMIBE 10 MG/1
10 TABLET ORAL
Qty: 90 | Refills: 2 | Status: ACTIVE | COMMUNITY
Start: 2019-09-27 | End: 1900-01-01

## 2023-11-21 ENCOUNTER — NON-APPOINTMENT (OUTPATIENT)
Age: 78
End: 2023-11-21

## 2023-11-29 ENCOUNTER — APPOINTMENT (OUTPATIENT)
Dept: INTERNAL MEDICINE | Facility: CLINIC | Age: 78
End: 2023-11-29
Payer: MEDICARE

## 2023-11-29 VITALS
HEART RATE: 98 BPM | DIASTOLIC BLOOD PRESSURE: 86 MMHG | BODY MASS INDEX: 27.46 KG/M2 | OXYGEN SATURATION: 97 % | WEIGHT: 165 LBS | SYSTOLIC BLOOD PRESSURE: 143 MMHG

## 2023-11-29 VITALS — SYSTOLIC BLOOD PRESSURE: 126 MMHG | DIASTOLIC BLOOD PRESSURE: 82 MMHG

## 2023-11-29 PROBLEM — Z78.9 OTHER SPECIFIED HEALTH STATUS: Chronic | Status: ACTIVE | Noted: 2022-11-28

## 2023-11-29 PROCEDURE — 99214 OFFICE O/P EST MOD 30 MIN: CPT

## 2023-12-15 ENCOUNTER — TRANSCRIPTION ENCOUNTER (OUTPATIENT)
Age: 78
End: 2023-12-15

## 2023-12-15 ENCOUNTER — OUTPATIENT (OUTPATIENT)
Dept: OUTPATIENT SERVICES | Facility: HOSPITAL | Age: 78
LOS: 1 days | End: 2023-12-15
Payer: MEDICARE

## 2023-12-15 VITALS
DIASTOLIC BLOOD PRESSURE: 61 MMHG | OXYGEN SATURATION: 97 % | TEMPERATURE: 97 F | HEART RATE: 83 BPM | WEIGHT: 156.97 LBS | RESPIRATION RATE: 27 BRPM | SYSTOLIC BLOOD PRESSURE: 128 MMHG | HEIGHT: 64 IN

## 2023-12-15 VITALS
RESPIRATION RATE: 16 BRPM | SYSTOLIC BLOOD PRESSURE: 128 MMHG | HEART RATE: 74 BPM | OXYGEN SATURATION: 98 % | DIASTOLIC BLOOD PRESSURE: 59 MMHG

## 2023-12-15 DIAGNOSIS — Z98.51 TUBAL LIGATION STATUS: Chronic | ICD-10-CM

## 2023-12-15 DIAGNOSIS — Z85.038 PERSONAL HISTORY OF OTHER MALIGNANT NEOPLASM OF LARGE INTESTINE: ICD-10-CM

## 2023-12-15 DIAGNOSIS — Z98.890 OTHER SPECIFIED POSTPROCEDURAL STATES: Chronic | ICD-10-CM

## 2023-12-15 DIAGNOSIS — D12.6 BENIGN NEOPLASM OF COLON, UNSPECIFIED: ICD-10-CM

## 2023-12-15 PROCEDURE — 88305 TISSUE EXAM BY PATHOLOGIST: CPT | Mod: 26

## 2023-12-15 PROCEDURE — 88305 TISSUE EXAM BY PATHOLOGIST: CPT

## 2023-12-15 PROCEDURE — 45385 COLONOSCOPY W/LESION REMOVAL: CPT | Mod: PT

## 2023-12-15 RX ORDER — SODIUM CHLORIDE 9 MG/ML
500 INJECTION INTRAMUSCULAR; INTRAVENOUS; SUBCUTANEOUS
Refills: 0 | Status: COMPLETED | OUTPATIENT
Start: 2023-12-15 | End: 2023-12-15

## 2023-12-15 RX ORDER — ROSUVASTATIN CALCIUM 5 MG/1
1 TABLET ORAL
Refills: 0 | DISCHARGE

## 2023-12-15 RX ORDER — AMLODIPINE BESYLATE 2.5 MG/1
1 TABLET ORAL
Refills: 0 | DISCHARGE

## 2023-12-15 RX ADMIN — SODIUM CHLORIDE 75 MILLILITER(S): 9 INJECTION INTRAMUSCULAR; INTRAVENOUS; SUBCUTANEOUS at 09:36

## 2023-12-15 NOTE — PRE-ANESTHESIA EVALUATION ADULT - WEIGHT IN KG

## 2023-12-15 NOTE — ASU DISCHARGE PLAN (ADULT/PEDIATRIC) - NS MD DC FALL RISK RISK
For information on Fall & Injury Prevention, visit: [email or web address] OR  [email or web address] OR  https://www.cdc.gov/steadi/patient.html For information on Fall & Injury Prevention, visit: [email or web address] OR  [email or web address] OR  https://www.cdc.gov/steadi/patient.html

## 2023-12-15 NOTE — ASU PATIENT PROFILE, ADULT - DOES PATIENT HAVE ADVANCE DIRECTIVE
Health Maintenance Due   Topic Date Due    Shingles Vaccine (1 of 2) 05/13/1997    Colorectal Cancer Screening  01/10/2016    Pneumococcal Vaccine (65+ High/Highest Risk) (2 of 2 - PPSV23) 03/21/2019    Influenza Vaccine (1) 08/01/2020    Lipid Panel  12/05/2020    Foot Exam  01/27/2021     Updates were requested from care everywhere.  Chart was reviewed for overdue Proactive [facility] Encounters ([facility]) topics (CRS, Breast Cancer Screening, Eye exam)  Health Maintenance has been updated.  LINKS immunization registry triggered.  Immunizations were reconciled.       No

## 2023-12-15 NOTE — ASU PATIENT PROFILE, ADULT - FALL HARM RISK - UNIVERSAL INTERVENTIONS
Bed in lowest position, wheels locked, appropriate side rails in place/Call bell, personal items and telephone in reach/Instruct patient to call for assistance before getting out of bed or chair/Non-slip footwear when patient is out of bed/Bandy to call system/Physically safe environment - no spills, clutter or unnecessary equipment/Purposeful Proactive Rounding/Room/bathroom lighting operational, light cord in reach Bed in lowest position, wheels locked, appropriate side rails in place/Call bell, personal items and telephone in reach/Instruct patient to call for assistance before getting out of bed or chair/Non-slip footwear when patient is out of bed/Carmen to call system/Physically safe environment - no spills, clutter or unnecessary equipment/Purposeful Proactive Rounding/Room/bathroom lighting operational, light cord in reach

## 2023-12-15 NOTE — PACU DISCHARGE NOTE - COMMENTS
Patient with vomit during procedure without evidence of aspiration, monitored for extended time in recovery, satting well, no cough, patient feeling well.  Instructed patient to return to ED if she feels unwell, fever, sob, cough but safe for discharge at this time.

## 2023-12-15 NOTE — ASU DISCHARGE PLAN (ADULT/PEDIATRIC) - "IF YOU OR YOUR GUARDIAN/FAMILY IS A SMOKER, IT IS IMPORTANT FOR YOUR HEALTH TO STOP SMOKING. PLEASE BE AWARE THAT SECOND HAND SMOKE IS ALSO HARMFUL."
Moderate Dry Eyes: Prescribed disappearing preservative or preservative-free artificial tears 4-6x per day OU and the daily intake of Omega-3 DHA/EPA fatty acids to help relieve symptoms. Add nightly lubricating ointment or gel.  Will consider Restasis or punctal plugs and/or Lipiflow treatment next visit if not responsive or if symptoms persist. Return for follow-up as scheduled or sooner if symptoms persist. Statement Selected

## 2023-12-15 NOTE — ASU PATIENT PROFILE, ADULT - NSICDXPASTMEDICALHX_GEN_ALL_CORE_FT
PAST MEDICAL HISTORY:  Acid reflux     AS (aortic stenosis) moderate ( ECHO 2013)  "asymptomatic"    AV block     Carotid artery disease on follow up    Colon cancer adenocarcinoma    Dry eyes     HLD (hyperlipidemia)     HTN (hypertension)     No pertinent past medical history     OP (osteoporosis)     Syncope last 2012

## 2023-12-15 NOTE — PRE PROCEDURE NOTE - PRE PROCEDURE EVALUATION
Attending Physician:       Mira                     Procedure:    Indication for Procedure:  ________________________________________________________  PAST MEDICAL & SURGICAL HISTORY:  AS (aortic stenosis)  moderate ( ECHO 2013)  "asymptomatic"      HTN (hypertension)      HLD (hyperlipidemia)      Dry eyes      Colon cancer  adenocarcinoma      Acid reflux      OP (osteoporosis)      AV block      Syncope  last 2012      Carotid artery disease  on follow up      No pertinent past medical history      H/O tubal ligation      S/P colon polypectomy        ALLERGIES:  Demerol HCl (Hives)  Demerol HCl (Vomiting)    HOME MEDICATIONS:  acetaminophen-oxyCODONE 325 mg-5 mg oral tablet: 1 tab(s) orally every 4 hours, As needed, Moderate Pain  amLODIPine 10 mg oral tablet: 1 tab(s) orally  benzonatate 200 mg oral capsule: 1 cap(s) orally  Calcium 600+D oral tablet: 1 tab(s) orally 2 times a day  Crestor 10 mg oral tablet: 1 tab(s) orally  Fish Oil: 1300 milligram(s) orally once a day  hydrochlorothiazide 25 mg oral tablet: 1 tab(s) orally once a day  magnesium: 400 milligram(s) orally once a day  simvastatin 40 mg oral tablet: 1 tab(s) orally once a day (at bedtime)  Zetia 10 mg oral tablet: 1 tab(s) orally once a day    AICD/PPM: [ ] yes   [ ] no    PERTINENT LAB DATA:                      PHYSICAL EXAMINATION:    Height (cm): 162.6  Weight (kg): 71.2  BMI (kg/m2): 26.9  BSA (m2): 1.76T(C): 36.3  HR: 83  BP: 128/61  RR: 27  SpO2: 97%    Constitutional: NAD  HEENT: PERRLA, EOMI,    Neck:  No JVD  Respiratory: CTAB/L  Cardiovascular: S1 and S2  Gastrointestinal: BS+, soft, NT/ND  Extremities: No peripheral edema  Neurological: A/O x 3, no focal deficits  Psychiatric: Normal mood, normal affect  Skin: No rashes    ASA Class: I [ ]  II [ ]  III [x ]  IV [ ]    COMMENTS:    The patient is a suitable candidate for the planned procedure unless box checked [ ]  No, explain:

## 2023-12-19 LAB
SURGICAL PATHOLOGY STUDY: SIGNIFICANT CHANGE UP
SURGICAL PATHOLOGY STUDY: SIGNIFICANT CHANGE UP

## 2023-12-20 ENCOUNTER — APPOINTMENT (OUTPATIENT)
Dept: INTERNAL MEDICINE | Facility: CLINIC | Age: 78
End: 2023-12-20
Payer: MEDICARE

## 2023-12-20 VITALS
HEART RATE: 96 BPM | HEIGHT: 65 IN | SYSTOLIC BLOOD PRESSURE: 116 MMHG | DIASTOLIC BLOOD PRESSURE: 70 MMHG | RESPIRATION RATE: 12 BRPM | OXYGEN SATURATION: 98 % | TEMPERATURE: 98.4 F

## 2023-12-20 DIAGNOSIS — I35.0 NONRHEUMATIC AORTIC (VALVE) STENOSIS: ICD-10-CM

## 2023-12-20 DIAGNOSIS — R05.9 COUGH, UNSPECIFIED: ICD-10-CM

## 2023-12-20 PROCEDURE — 99214 OFFICE O/P EST MOD 30 MIN: CPT

## 2023-12-20 NOTE — HISTORY OF PRESENT ILLNESS
[FreeTextEntry8] : Patient presents to office complaining of a 1 week history of cough productive of slight sputum and bilateral ear pain. She denies any fever, chills, night sweats, chest pain or shortness of breath.  She states symptoms started last Wednesday after having a colonoscopy.  She denies of any complications with colonoscopy She denies any recent travel and there are no other family members who are ill

## 2023-12-20 NOTE — PHYSICAL EXAM
[No Acute Distress] : no acute distress [Normal Sclera/Conjunctiva] : normal sclera/conjunctiva [Normal Oropharynx] : the oropharynx was normal [No Lymphadenopathy] : no lymphadenopathy [Normal S1, S2] : normal S1 and S2 [No Edema] : there was no peripheral edema [Normal] : soft, non-tender, non-distended, no masses palpated, no HSM and normal bowel sounds [Normal Supraclavicular Nodes] : no supraclavicular lymphadenopathy [Normal Posterior Cervical Nodes] : no posterior cervical lymphadenopathy [Normal Anterior Cervical Nodes] : no anterior cervical lymphadenopathy [No CVA Tenderness] : no CVA  tenderness [No Rash] : no rash [de-identified] : Slight erythema left tympanic membrane [de-identified] : Scattered rhonchi, few Rales left base, no dullness, no rub [de-identified] : 4/6 systolic murmur heard best at the

## 2023-12-27 ENCOUNTER — APPOINTMENT (OUTPATIENT)
Dept: INTERNAL MEDICINE | Facility: CLINIC | Age: 78
End: 2023-12-27
Payer: MEDICARE

## 2023-12-27 VITALS
RESPIRATION RATE: 12 BRPM | WEIGHT: 165 LBS | OXYGEN SATURATION: 98 % | BODY MASS INDEX: 27.49 KG/M2 | HEIGHT: 65 IN | SYSTOLIC BLOOD PRESSURE: 131 MMHG | DIASTOLIC BLOOD PRESSURE: 82 MMHG | HEART RATE: 77 BPM

## 2023-12-27 DIAGNOSIS — J40 BRONCHITIS, NOT SPECIFIED AS ACUTE OR CHRONIC: ICD-10-CM

## 2023-12-27 PROCEDURE — 99213 OFFICE O/P EST LOW 20 MIN: CPT

## 2023-12-27 NOTE — PHYSICAL EXAM
[Normal Sclera/Conjunctiva] : normal sclera/conjunctiva [Normal] : normal rate, regular rhythm, normal S1 and S2 and no murmur heard [Normal Supraclavicular Nodes] : no supraclavicular lymphadenopathy [Normal Posterior Cervical Nodes] : no posterior cervical lymphadenopathy [Normal Anterior Cervical Nodes] : no anterior cervical lymphadenopathy

## 2023-12-27 NOTE — HISTORY OF PRESENT ILLNESS
[FreeTextEntry1] : Follow-up of URI [de-identified] : Patient presents to office for follow-up following an upper respiratory infection.  She was seen approximately 5 days ago, chest x-ray was negative but she was prescribed Zithromax on clinical grounds of a possible bacterial bronchitis which she completed.  Viral culture revealed enterovirus.  Patient returns today feeling well, denies any significant cough, sputum, fever chills or shortness of

## 2023-12-28 LAB
RAPID RVP RESULT: DETECTED
RV+EV RNA SPEC QL NAA+PROBE: DETECTED
SARS-COV-2 RNA PNL RESP NAA+PROBE: NOT DETECTED

## 2024-03-13 ENCOUNTER — APPOINTMENT (OUTPATIENT)
Dept: INTERNAL MEDICINE | Facility: CLINIC | Age: 79
End: 2024-03-13
Payer: MEDICARE

## 2024-03-13 VITALS
WEIGHT: 154 LBS | HEART RATE: 86 BPM | SYSTOLIC BLOOD PRESSURE: 134 MMHG | RESPIRATION RATE: 12 BRPM | OXYGEN SATURATION: 97 % | DIASTOLIC BLOOD PRESSURE: 82 MMHG | HEIGHT: 65 IN | BODY MASS INDEX: 25.66 KG/M2

## 2024-03-13 VITALS — DIASTOLIC BLOOD PRESSURE: 70 MMHG | SYSTOLIC BLOOD PRESSURE: 120 MMHG

## 2024-03-13 DIAGNOSIS — I10 ESSENTIAL (PRIMARY) HYPERTENSION: ICD-10-CM

## 2024-03-13 DIAGNOSIS — E78.5 HYPERLIPIDEMIA, UNSPECIFIED: ICD-10-CM

## 2024-03-13 DIAGNOSIS — R73.03 PREDIABETES.: ICD-10-CM

## 2024-03-13 PROCEDURE — 99214 OFFICE O/P EST MOD 30 MIN: CPT

## 2024-03-13 PROCEDURE — 36415 COLL VENOUS BLD VENIPUNCTURE: CPT

## 2024-03-13 RX ORDER — AZITHROMYCIN 250 MG/1
250 TABLET, FILM COATED ORAL
Qty: 1 | Refills: 0 | Status: DISCONTINUED | COMMUNITY
Start: 2023-12-20 | End: 2024-03-13

## 2024-03-13 RX ORDER — BENZONATATE 200 MG/1
200 CAPSULE ORAL
Qty: 10 | Refills: 0 | Status: DISCONTINUED | COMMUNITY
Start: 2022-03-27 | End: 2024-03-13

## 2024-03-13 RX ORDER — BENZONATATE 200 MG/1
200 CAPSULE ORAL TWICE DAILY
Qty: 20 | Refills: 1 | Status: DISCONTINUED | COMMUNITY
Start: 2023-07-13 | End: 2024-03-13

## 2024-03-13 NOTE — PHYSICAL EXAM
[Normal Sclera/Conjunctiva] : normal sclera/conjunctiva [No Acute Distress] : no acute distress [No Lymphadenopathy] : no lymphadenopathy [Thyroid Normal, No Nodules] : the thyroid was normal and there were no nodules present [No Carotid Bruits] : no carotid bruits [No Abdominal Bruit] : a ~M bruit was not heard ~T in the abdomen [No Varicosities] : no varicosities [No Edema] : there was no peripheral edema [No Palpable Aorta] : no palpable aorta [Normal] : soft, non-tender, non-distended, no masses palpated, no HSM and normal bowel sounds [Normal Supraclavicular Nodes] : no supraclavicular lymphadenopathy [Normal Posterior Cervical Nodes] : no posterior cervical lymphadenopathy [Normal Anterior Cervical Nodes] : no anterior cervical lymphadenopathy [No CVA Tenderness] : no CVA  tenderness

## 2024-03-13 NOTE — ASSESSMENT
[FreeTextEntry1] : Stable with well-controlled blood pressure Check routine blood work Advise regular aerobic exercise and heart healthy diet

## 2024-03-13 NOTE — HISTORY OF PRESENT ILLNESS
[FreeTextEntry1] : Routine follow-up [de-identified] : Patient with history of hypertension, hyperlipidemia and prediabetes presents to office for routine follow-up. Overall she feels well and denies any chest pain, shortness of breath, palpitations.  She states compliance with her medication.

## 2024-03-19 LAB
ALBUMIN SERPL ELPH-MCNC: 4.6 G/DL
ALP BLD-CCNC: 104 U/L
ALT SERPL-CCNC: 38 U/L
ANION GAP SERPL CALC-SCNC: 12 MMOL/L
AST SERPL-CCNC: 29 U/L
BILIRUB SERPL-MCNC: 2.7 MG/DL
BUN SERPL-MCNC: 12 MG/DL
CALCIUM SERPL-MCNC: 10.4 MG/DL
CHLORIDE SERPL-SCNC: 99 MMOL/L
CHOLEST SERPL-MCNC: 235 MG/DL
CO2 SERPL-SCNC: 29 MMOL/L
CREAT SERPL-MCNC: 0.91 MG/DL
EGFR: 65 ML/MIN/1.73M2
ESTIMATED AVERAGE GLUCOSE: 123 MG/DL
GLUCOSE SERPL-MCNC: 107 MG/DL
GLUCOSE SERPL-MCNC: 98 MG/DL
HBA1C MFR BLD HPLC: 5.9 %
HDLC SERPL-MCNC: 45 MG/DL
LDLC SERPL CALC-MCNC: 128 MG/DL
NONHDLC SERPL-MCNC: 191 MG/DL
POTASSIUM SERPL-SCNC: 4.1 MMOL/L
PROT SERPL-MCNC: 6.9 G/DL
SODIUM SERPL-SCNC: 140 MMOL/L
TRIGL SERPL-MCNC: 353 MG/DL

## 2024-06-05 ENCOUNTER — APPOINTMENT (OUTPATIENT)
Dept: INTERNAL MEDICINE | Facility: CLINIC | Age: 79
End: 2024-06-05
Payer: MEDICARE

## 2024-06-05 VITALS
OXYGEN SATURATION: 98 % | HEART RATE: 78 BPM | HEIGHT: 65 IN | DIASTOLIC BLOOD PRESSURE: 77 MMHG | RESPIRATION RATE: 12 BRPM | SYSTOLIC BLOOD PRESSURE: 127 MMHG | BODY MASS INDEX: 25.99 KG/M2 | WEIGHT: 156 LBS

## 2024-06-05 DIAGNOSIS — E80.6 OTHER DISORDERS OF BILIRUBIN METABOLISM: ICD-10-CM

## 2024-06-05 PROCEDURE — 36415 COLL VENOUS BLD VENIPUNCTURE: CPT

## 2024-06-05 PROCEDURE — 99213 OFFICE O/P EST LOW 20 MIN: CPT

## 2024-06-05 NOTE — PHYSICAL EXAM
[No Acute Distress] : no acute distress [Normal Sclera/Conjunctiva] : normal sclera/conjunctiva [No Edema] : there was no peripheral edema [Normal] : soft, non-tender, non-distended, no masses palpated, no HSM and normal bowel sounds [Normal Supraclavicular Nodes] : no supraclavicular lymphadenopathy [Normal Posterior Cervical Nodes] : no posterior cervical lymphadenopathy [Normal Anterior Cervical Nodes] : no anterior cervical lymphadenopathy [Normal Inguinal Nodes] : no inguinal lymphadenopathy [No CVA Tenderness] : no CVA  tenderness [de-identified] : S1-S2 normal, 4/6 systolic murmur heard best at the right sternal border

## 2024-06-05 NOTE — HISTORY OF PRESENT ILLNESS
[FreeTextEntry1] : Routine follow-up [de-identified] : Patient presents to office for repeat bilirubin level. She has had a history of benign hyperbilirubinemia but during her last blood draw her bilirubin was much higher than usual. She denies any GI symptoms.,  Her weight and appetite have been stable

## 2024-06-10 LAB
ALBUMIN SERPL ELPH-MCNC: 4.6 G/DL
ALP BLD-CCNC: 104 U/L
ALT SERPL-CCNC: 30 U/L
ANION GAP SERPL CALC-SCNC: 12 MMOL/L
AST SERPL-CCNC: 31 U/L
BILIRUB SERPL-MCNC: 2 MG/DL
BUN SERPL-MCNC: 14 MG/DL
CALCIUM SERPL-MCNC: 9.8 MG/DL
CHLORIDE SERPL-SCNC: 99 MMOL/L
CO2 SERPL-SCNC: 29 MMOL/L
CREAT SERPL-MCNC: 0.79 MG/DL
EGFR: 77 ML/MIN/1.73M2
GLUCOSE SERPL-MCNC: 105 MG/DL
POTASSIUM SERPL-SCNC: 3.5 MMOL/L
PROT SERPL-MCNC: 6.8 G/DL
SODIUM SERPL-SCNC: 140 MMOL/L

## 2024-06-24 ENCOUNTER — RX RENEWAL (OUTPATIENT)
Age: 79
End: 2024-06-24

## 2024-06-24 RX ORDER — ROSUVASTATIN CALCIUM 5 MG/1
5 TABLET, FILM COATED ORAL
Qty: 90 | Refills: 2 | Status: ACTIVE | COMMUNITY
Start: 2017-12-12 | End: 1900-01-01

## 2024-07-25 ENCOUNTER — RX RENEWAL (OUTPATIENT)
Age: 79
End: 2024-07-25

## 2024-08-05 ENCOUNTER — NON-APPOINTMENT (OUTPATIENT)
Age: 79
End: 2024-08-05

## 2024-08-05 ENCOUNTER — APPOINTMENT (OUTPATIENT)
Dept: INTERNAL MEDICINE | Facility: CLINIC | Age: 79
End: 2024-08-05

## 2024-08-05 PROCEDURE — G0439: CPT

## 2024-08-05 PROCEDURE — 36415 COLL VENOUS BLD VENIPUNCTURE: CPT

## 2024-08-05 PROCEDURE — G2211 COMPLEX E/M VISIT ADD ON: CPT | Mod: NC

## 2024-08-05 PROCEDURE — G0444 DEPRESSION SCREEN ANNUAL: CPT | Mod: 59

## 2024-08-05 PROCEDURE — 99213 OFFICE O/P EST LOW 20 MIN: CPT | Mod: 25

## 2024-08-05 PROCEDURE — 93000 ELECTROCARDIOGRAM COMPLETE: CPT | Mod: 59

## 2024-08-05 NOTE — HEALTH RISK ASSESSMENT
[Little interest or pleasure doing things] : 1) Little interest or pleasure doing things [Feeling down, depressed, or hopeless] : 2) Feeling down, depressed, or hopeless [0] : 2) Feeling down, depressed, or hopeless: Not at all (0) [PHQ-2 Negative - No further assessment needed] : PHQ-2 Negative - No further assessment needed [Patient reported mammogram was normal] : Patient reported mammogram was normal [Patient reported PAP Smear was abnormal] : Patient reported PAP Smear was abnormal [Patient reported colonoscopy was normal] : Patient reported colonoscopy was normal [[id number]] : 0 [MammogramDate] : 2024 [MammogramComments] : N Rad radiology [PapSmearComments] : Abnormal GYN exam, scheduled for biopsy [ColonoscopyDate] : 12/2023

## 2024-08-05 NOTE — HISTORY OF PRESENT ILLNESS
[de-identified] : Patient with history of hypertension, hyperlipidemia, prediabetes, aortic stenosis, and history of malignant neoplasm of the colon presents to office for annual wellness exam and routine follow-up. Overall she feels well and denies any exertional chest pain, shortness of breath, palpitations and states compliance with all medication She is presently under the care of her gynecologist for vaginal pruritus and was noted to have a lesion and is scheduled to undergo biopsy. She denies any rectal bleeding or melenic stools She states she had a follow-up colonoscopy in December 2023 with Dr. Meyers

## 2024-08-05 NOTE — PHYSICAL EXAM
[Normal Sclera/Conjunctiva] : normal sclera/conjunctiva [PERRL] : pupils equal round and reactive to light [EOMI] : extraocular movements intact [Normal Outer Ear/Nose] : the outer ears and nose were normal in appearance [Normal Oropharynx] : the oropharynx was normal [No Lymphadenopathy] : no lymphadenopathy [Thyroid Normal, No Nodules] : the thyroid was normal and there were no nodules present [Normal Appearance] : normal in appearance [No Masses] : no palpable masses [No Nipple Discharge] : no nipple discharge [No Axillary Lymphadenopathy] : no axillary lymphadenopathy [Normal] : no posterior cervical lymphadenopathy and no anterior cervical lymphadenopathy [No CVA Tenderness] : no CVA  tenderness [No Spinal Tenderness] : no spinal tenderness [No Joint Swelling] : no joint swelling [No Rash] : no rash [Coordination Grossly Intact] : coordination grossly intact [No Focal Deficits] : no focal deficits [Normal Gait] : normal gait [Normal Affect] : the affect was normal [Normal Insight/Judgement] : insight and judgment were intact

## 2024-08-05 NOTE — ASSESSMENT
[FreeTextEntry1] : Stable Continue present medication Check routine blood work Advise regular aerobic exercise and heart healthy diet

## 2024-08-09 ENCOUNTER — LABORATORY RESULT (OUTPATIENT)
Age: 79
End: 2024-08-09

## 2024-09-12 ENCOUNTER — NON-APPOINTMENT (OUTPATIENT)
Age: 79
End: 2024-09-12

## 2024-09-12 ENCOUNTER — APPOINTMENT (OUTPATIENT)
Dept: INTERNAL MEDICINE | Facility: CLINIC | Age: 79
End: 2024-09-12
Payer: MEDICARE

## 2024-09-12 VITALS
BODY MASS INDEX: 27.46 KG/M2 | HEART RATE: 95 BPM | WEIGHT: 155 LBS | SYSTOLIC BLOOD PRESSURE: 140 MMHG | OXYGEN SATURATION: 98 % | HEIGHT: 63 IN | DIASTOLIC BLOOD PRESSURE: 84 MMHG

## 2024-09-12 DIAGNOSIS — R73.03 PREDIABETES.: ICD-10-CM

## 2024-09-12 DIAGNOSIS — E78.5 HYPERLIPIDEMIA, UNSPECIFIED: ICD-10-CM

## 2024-09-12 PROBLEM — Z01.818 PREOPERATIVE EXAMINATION: Status: ACTIVE | Noted: 2024-09-12

## 2024-09-12 PROCEDURE — 99214 OFFICE O/P EST MOD 30 MIN: CPT

## 2024-09-12 PROCEDURE — 93000 ELECTROCARDIOGRAM COMPLETE: CPT

## 2024-09-12 NOTE — PHYSICAL EXAM
[Normal Sclera/Conjunctiva] : normal sclera/conjunctiva [PERRL] : pupils equal round and reactive to light [EOMI] : extraocular movements intact [Normal Oropharynx] : the oropharynx was normal [No Lymphadenopathy] : no lymphadenopathy [Thyroid Normal, No Nodules] : the thyroid was normal and there were no nodules present [No Carotid Bruits] : no carotid bruits [No Abdominal Bruit] : a ~M bruit was not heard ~T in the abdomen [No Varicosities] : no varicosities [No Edema] : there was no peripheral edema [No Palpable Aorta] : no palpable aorta [Normal] : soft, non-tender, non-distended, no masses palpated, no HSM and normal bowel sounds [Normal Supraclavicular Nodes] : no supraclavicular lymphadenopathy [Normal Posterior Cervical Nodes] : no posterior cervical lymphadenopathy [Normal Anterior Cervical Nodes] : no anterior cervical lymphadenopathy [No CVA Tenderness] : no CVA  tenderness [No Rash] : no rash [Coordination Grossly Intact] : coordination grossly intact [No Focal Deficits] : no focal deficits [Normal Gait] : normal gait [Normal Insight/Judgement] : insight and judgment were intact

## 2024-09-16 ENCOUNTER — APPOINTMENT (OUTPATIENT)
Dept: CARDIOLOGY | Facility: CLINIC | Age: 79
End: 2024-09-16
Payer: MEDICARE

## 2024-09-16 VITALS
SYSTOLIC BLOOD PRESSURE: 162 MMHG | OXYGEN SATURATION: 99 % | HEIGHT: 63 IN | DIASTOLIC BLOOD PRESSURE: 98 MMHG | WEIGHT: 157 LBS | BODY MASS INDEX: 27.82 KG/M2 | HEART RATE: 90 BPM

## 2024-09-16 DIAGNOSIS — I35.0 NONRHEUMATIC AORTIC (VALVE) STENOSIS: ICD-10-CM

## 2024-09-16 DIAGNOSIS — Z01.818 ENCOUNTER FOR OTHER PREPROCEDURAL EXAMINATION: ICD-10-CM

## 2024-09-16 DIAGNOSIS — E78.5 HYPERLIPIDEMIA, UNSPECIFIED: ICD-10-CM

## 2024-09-16 DIAGNOSIS — I10 ESSENTIAL (PRIMARY) HYPERTENSION: ICD-10-CM

## 2024-09-16 PROCEDURE — 99215 OFFICE O/P EST HI 40 MIN: CPT

## 2024-09-16 PROCEDURE — G2211 COMPLEX E/M VISIT ADD ON: CPT

## 2024-09-16 NOTE — DISCUSSION/SUMMARY
[FreeTextEntry1] : Mrs. Rodríguez has been doing well from a cardiac symptomatic standpoint since her previous visit here on 8/7/2023. Specifically, she has not experienced recurrence of vagally-mediated presyncope or syncope. She does not describe having experienced any signs or symptoms to suggest the development of an anginal syndrome, congestive heart failure, or a hemodynamically-compromising arrhythmia. Her electrocardiogram performed at the office of Dr. Baca on 9/12/2024 revealed sinus rhythm at a rate of 86 bpm with mild first-degree AV block, voltage criteria for LVH, nonspecific early R wave transition in lead V2, and nonspecific ST-T wave abnormalities, essentially unchanged from her previous office tracing, allowing for lead placement variation.  Her cardiac examination today is remarkable for a grade III/VI harsh systolic ejection murmur, compatible with severe aortic stenosis, and unchanged from her previous visit with me. Her blood pressure reading today is satisfactory.  I have reviewed the findings of the 24-hour ambulatory blood pressure monitoring study of 2/9/2023 in detail with the patient today, and I have instructed her to continue her antihypertensive therapy as presently prescribed for the time being.  I have reviewed the findings of the blood test report of 8/5/2024 in detail with the patient today, and I have instructed her to continue the present dosages of Crestor and Zetia for the time being, and to be more diligent with taking these medications regularly. Unfortunately, the patient was unable to tolerate a higher dosage of Crestor previously.  I have pointed out to the patient that her triglyceride level was elevated on this blood test, indicating the need for further dietary modification and weight loss.  I have again discussed the option of injectable therapy (Repatha or Praluent) with the patient today as an alternative means of optimizing her cholesterol and LDL levels, however, she declines this option.  A follow-up fasting lipid profile will be planned in a few months for reassessment.  I have reviewed the findings of the carotid artery Doppler study of 10/6/2022 and the echocardiogram of 10/6/2022 in detail with the patient today.  I am referring the patient for follow-up echocardiography at this point to reassess the degree of aortic stenosis.  Carotid artery Doppler testing will be performed to reassess the degree of atherosclerosis formation demonstrated on the previous study.  The patient is going to make arrangements to have these studies performed through our office, and I will telephone her to discuss the findings, once the studies have been completed.  I have explained to the patient that these studies may be performed after her upcoming eye procedure(s), as her cardiac clearance for the procedure(s) is not contingent on having these cardiac studies performed preoperatively.  I have again discussed the implications of aortic stenosis with the patient today, including the natural history. I alerted her to the potential signs and symptoms of progressive aortic stenosis, including dyspnea on exertion, chest discomfort, post-exertional presyncope/syncope, and exertional fatigue. I have asked her to call me if she should experience any of these symptoms.  The importance of proper dietary habits, weight loss, and regular exercise as tolerated was again discussed with the patient today.  I find no cardiac contraindication to the patient undergoing the proposed hide procedure(s) as scheduled, under routine monitoring conditions.  I have asked the patient to call me if she should have a questions or problems pertaining to these matters, and especially if she should experience any concerning symptoms. I have otherwise asked the patient to return to the office for follow-up cardiac evaluation and blood pressure reassessment in 6 months, provided she remains clinically stable in the interim.

## 2024-09-16 NOTE — CARDIOLOGY SUMMARY
[de-identified] : 9/12/24 -sinus rhythm at a rate of 86 bpm.  Mild first-degree AV block.  Left ventricular hypertrophy.  Nonspecific early R wave transition in lead V2.  Nonspecific ST-T wave abnormalities.

## 2024-09-16 NOTE — ASSESSMENT
[Patient Optimized for Surgery] : Patient optimized for surgery [No Further Testing Recommended] : no further testing recommended [Continue medications as is] : Continue current medications [As per surgery] : as per surgery [FreeTextEntry4] : Patient in most optimal medical condition for proposed procedure but requires preoperative cardiac evaluation

## 2024-09-16 NOTE — HISTORY OF PRESENT ILLNESS
[Aortic Stenosis] : aortic stenosis [No Adverse Anesthesia Reaction] : no adverse anesthesia reaction in self or family member [(Patient denies any chest pain, claudication, dyspnea on exertion, orthopnea, palpitations or syncope)] : Patient denies any chest pain, claudication, dyspnea on exertion, orthopnea, palpitations or syncope [Moderate (4-6 METs)] : Moderate (4-6 METs) [Atrial Fibrillation] : no atrial fibrillation [Coronary Artery Disease] : no coronary artery disease [Recent Myocardial Infarction] : no recent myocardial infarction [Implantable Device/Pacemaker] : no implantable device/pacemaker [Asthma] : no asthma [COPD] : no COPD [Sleep Apnea] : no sleep apnea [Smoker] : not a smoker [Chronic Anticoagulation] : no chronic anticoagulation [Chronic Kidney Disease] : no chronic kidney disease [Diabetes] : no diabetes [FreeTextEntry1] : right eye duct surgery [FreeTextEntry2] : 9/25/2024 [FreeTextEntry3] : Dr. Jaime Calle [FreeTextEntry4] : Patient with history of hypertension, hyperlipidemia, prediabetes and aortic stenosis presents for preoperative evaluation Overall she feels well and denies any exertional chest pain, shortness of breath, palpitations, syncope or presyncopal

## 2024-09-16 NOTE — HISTORY OF PRESENT ILLNESS
[FreeTextEntry1] : Mrs. Shauna Rodríguez presented to the office today for follow-up cardiac evaluation, and specifically, for preoperative cardiac evaluation.  She is scheduled to undergo bilateral eye duct surgery on 9/23/2024 for treatment of "clogged tear ducts", to be performed by Dr. Jaime Calle (fax: [phone]).  I last evaluated the patient in the office on 8/7/2023.  The patient is a 79-year-old female with a history of severe aortic stenosis, vagally-mediated presyncope/syncope, ventricular ectopy, moderate carotid atherosclerosis, hypertension, a dyslipidemia, pre-diabetes, and elevated bilirubin level (probably Gilbert's syndrome), osteopenia, GERD, and a malignant colon polyp (status post right hemicolectomy 6/17/16)..  The patient has been doing well from a cardiac symptomatic standpoint since her previous visit here on 8/7/2023. Specifically, she has not experienced recurrence of vagally-mediated presyncope or syncope. She has not experienced chest discomfort or dyspnea on exertion in association with her activities, which include performing household chores, climbing her basement steps, shopping, and exercise dancing. She has not noted orthopnea, paroxysmal nocturnal dyspnea, or lower extremity edema. She has not experienced any episodes of palpitations.  The patient has been understandably depressed since her  passed away in June 2021.  The patient was discovered as having a sessile cecal polyp on a routine follow-up colonoscopy performed on 4/15/16 (noting that she has a history of colonic polyps), with the biopsy revealing this polyp to be adenocarcinoma. She subsequently underwent laparoscopic right hemicolectomy on 6/7/16, without any associated complications. She underwent follow-up surveillance colonoscopy on 11/16/2021, during which 2 polyps were resected.  She most recently underwent colonoscopy on 12/2/2022, with no polyps detected (EGD was also performed, revealing minimal distal reflux esophagitis).  As far as risk factors for coronary artery disease are concerned, the patient has a history of a dyslipidemia, pre-diabetes, and hypertension. She discontinued cigarette smoking approximately 24 years ago, having previously smoked one to one and a half packs per day for a period of 20 years. Her family history is unknown, as she was raised in a foster home.  Laboratory studies performed on 8/5/2024 (on Crestor 5 mg daily and Zetia 10 mg daily) revealed cholesterol 209, triglycerides 190, HDL 57, and calculated .  The bilirubin level was elevated at 2.2.  The remainder of the liver chemistries were normal.  The BUN and creatinine were 11 and 0.87, respectively.  The potassium level was 4.0.  The glucose level was 113 and the hemoglobin A1c level was 5.8%).  The hemoglobin and hematocrit were 14.2 and 44.9, respectively.  Echocardiography most recently performed on 10/6/2022 revealed the left atrium to be moderately dilated.  The remainder of the cardiac chambers were normal in dimension.  Moderate concentric left ventricular hypertrophy was exhibited, as well as a prominent basal septum.  Left ventricular systolic function was normal, with a calculated ejection fraction of 68%.  Mild left ventricular diastolic dysfunction was demonstrated.  Severe aortic stenosis (peak gradient 119 mmHg, mean gradient 78 mmHg, estimated LENORA 0.5 cm) and mild aortic regurgitation were demonstrated.  Mild mitral regurgitation and mild tricuspid regurgitation were demonstrated, without evidence of pulmonary hypertension.  These findings were present and no significant change when compared with a previous study performed on 10/6/2021.  Carotid artery Doppler testing most recently performed on 10/6/2022 revealed mild to moderate plaque formation involving the right bulb, as well as mild plaque formation involving the proximal portion of the right internal carotid artery and the right external carotid artery.  Mild plaque formation was noted involving the mid-portion of the left common carotid artery, the left carotid bulb, and the proximal portion of the left internal carotid artery.  No significant stenoses were demonstrated.  Nuclear exercise testing performed on 11/17/05 was negative for the inducement of cardiac symptoms or electrocardiographic evidence of myocardial ischemia. The cardiac imaging portion of the study revealed normal left ventricular myocardial perfusion and systolic function, with a calculated ejection fraction of 72%.  A 24-hour ambulatory blood pressure monitoring study performed on 2/9/2023 revealed the average reading to be 116/65, with 8% of systolic readings being in the elevated range and no diastolic readings being in the elevated range.  Holter monitoring performed from 11/3/05 through 11/4/05 revealed sinus rhythm throughout the recording an average rate of 76 beats per minute. Rare supraventricular premature contractions and one ventricular premature contraction were noted. No symptoms were reported during the recording period.  I had spoken with the patient on the telephone on 10/18/16 after reviewing her laboratory results at that time, and I instructed her at that time to switch from Zocor 20 mg daily and Zetia 10 mg daily to Crestor 5 mg daily. I then spoke with her on 12/19/16 after reviewing her laboratory study results, and instructed her to increase the dosage of Crestor to 10 mg daily. She subsequently developed muscle aches, and when I spoke with her on the telephone on 2/6/17 regarding this issue, I instructed her to reduce the dosage of Crestor to 5 mg daily, and to resume therapy with Zetia at a dosage of 10 mg daily. She has been tolerating this regimen thus far.  Previous History:  I had initially evaluated the patient on 11/1/05, regarding a syncopal episode. She had been making soup that morning, and the hot soup splattered and resulted in a minor burn to her right neck region. A few hours later, she was having her nails manicured at a salon, and the manicurist (who was from [facility]) suggested that she apply a balm to the burned area. While the manicurist was massaging this balm into the right neck region, the patient became nauseous and diaphoretic. She put her head down on the table, and experienced a brief syncopal episode. She was brought to the emergency room at [facility], where she was evaluated and subsequently released. Note that the patient reported having also experienced an episode of presyncope several years earlier, which occurred after she donated blood.  Past medical history is otherwise significant for a tubal ligation procedure.  She describes having had 2 uncomplicated pregnancies, both delivered vaginally.

## 2024-10-08 RX ORDER — ALPRAZOLAM 0.25 MG/1
0.25 TABLET ORAL
Qty: 5 | Refills: 0 | Status: ACTIVE | COMMUNITY

## 2024-10-09 ENCOUNTER — APPOINTMENT (OUTPATIENT)
Dept: CARDIOLOGY | Facility: CLINIC | Age: 79
End: 2024-10-09

## 2024-10-21 ENCOUNTER — RX RENEWAL (OUTPATIENT)
Age: 79
End: 2024-10-21

## 2025-02-02 ENCOUNTER — INPATIENT (INPATIENT)
Facility: HOSPITAL | Age: 80
LOS: 2 days | Discharge: NURSING, REHAB OR LONG-TERM CARE | DRG: 536 | End: 2025-02-05
Attending: STUDENT IN AN ORGANIZED HEALTH CARE EDUCATION/TRAINING PROGRAM | Admitting: INTERNAL MEDICINE
Payer: MEDICARE

## 2025-02-02 VITALS
SYSTOLIC BLOOD PRESSURE: 180 MMHG | DIASTOLIC BLOOD PRESSURE: 84 MMHG | TEMPERATURE: 97 F | WEIGHT: 149.91 LBS | HEIGHT: 63 IN | OXYGEN SATURATION: 92 % | RESPIRATION RATE: 22 BRPM | HEART RATE: 73 BPM

## 2025-02-02 DIAGNOSIS — M25.551 PAIN IN RIGHT HIP: ICD-10-CM

## 2025-02-02 DIAGNOSIS — K21.9 GASTRO-ESOPHAGEAL REFLUX DISEASE WITHOUT ESOPHAGITIS: ICD-10-CM

## 2025-02-02 DIAGNOSIS — I10 ESSENTIAL (PRIMARY) HYPERTENSION: ICD-10-CM

## 2025-02-02 DIAGNOSIS — Z79.899 OTHER LONG TERM (CURRENT) DRUG THERAPY: ICD-10-CM

## 2025-02-02 DIAGNOSIS — Z98.51 TUBAL LIGATION STATUS: Chronic | ICD-10-CM

## 2025-02-02 DIAGNOSIS — Z29.9 ENCOUNTER FOR PROPHYLACTIC MEASURES, UNSPECIFIED: ICD-10-CM

## 2025-02-02 DIAGNOSIS — E78.5 HYPERLIPIDEMIA, UNSPECIFIED: ICD-10-CM

## 2025-02-02 DIAGNOSIS — F32.9 MAJOR DEPRESSIVE DISORDER, SINGLE EPISODE, UNSPECIFIED: ICD-10-CM

## 2025-02-02 DIAGNOSIS — I35.0 NONRHEUMATIC AORTIC (VALVE) STENOSIS: ICD-10-CM

## 2025-02-02 DIAGNOSIS — H04.123 DRY EYE SYNDROME OF BILATERAL LACRIMAL GLANDS: ICD-10-CM

## 2025-02-02 DIAGNOSIS — Z98.890 OTHER SPECIFIED POSTPROCEDURAL STATES: Chronic | ICD-10-CM

## 2025-02-02 DIAGNOSIS — S72.009A FRACTURE OF UNSPECIFIED PART OF NECK OF UNSPECIFIED FEMUR, INITIAL ENCOUNTER FOR CLOSED FRACTURE: ICD-10-CM

## 2025-02-02 LAB
ALBUMIN SERPL ELPH-MCNC: 3.1 G/DL — LOW (ref 3.3–5)
ALP SERPL-CCNC: 91 U/L — SIGNIFICANT CHANGE UP (ref 40–120)
ALT FLD-CCNC: 53 U/L — SIGNIFICANT CHANGE UP (ref 12–78)
ANION GAP SERPL CALC-SCNC: 11 MMOL/L — SIGNIFICANT CHANGE UP (ref 5–17)
APPEARANCE UR: CLEAR — SIGNIFICANT CHANGE UP
APTT BLD: 29.9 SEC — SIGNIFICANT CHANGE UP (ref 24.5–35.6)
AST SERPL-CCNC: 65 U/L — HIGH (ref 15–37)
BASOPHILS # BLD AUTO: 0.01 K/UL — SIGNIFICANT CHANGE UP (ref 0–0.2)
BASOPHILS NFR BLD AUTO: 0.1 % — SIGNIFICANT CHANGE UP (ref 0–2)
BILIRUB SERPL-MCNC: 2.6 MG/DL — HIGH (ref 0.2–1.2)
BILIRUB UR-MCNC: NEGATIVE — SIGNIFICANT CHANGE UP
BUN SERPL-MCNC: 29 MG/DL — HIGH (ref 7–23)
CALCIUM SERPL-MCNC: 9.3 MG/DL — SIGNIFICANT CHANGE UP (ref 8.5–10.1)
CHLORIDE SERPL-SCNC: 99 MMOL/L — SIGNIFICANT CHANGE UP (ref 96–108)
CK SERPL-CCNC: 1423 U/L — HIGH (ref 26–192)
CO2 SERPL-SCNC: 29 MMOL/L — SIGNIFICANT CHANGE UP (ref 22–31)
COLOR SPEC: YELLOW — SIGNIFICANT CHANGE UP
CREAT SERPL-MCNC: 0.72 MG/DL — SIGNIFICANT CHANGE UP (ref 0.5–1.3)
DIFF PNL FLD: NEGATIVE — SIGNIFICANT CHANGE UP
EGFR: 85 ML/MIN/1.73M2 — SIGNIFICANT CHANGE UP
EOSINOPHIL # BLD AUTO: 0.02 K/UL — SIGNIFICANT CHANGE UP (ref 0–0.5)
EOSINOPHIL NFR BLD AUTO: 0.1 % — SIGNIFICANT CHANGE UP (ref 0–6)
GLUCOSE SERPL-MCNC: 137 MG/DL — HIGH (ref 70–99)
GLUCOSE UR QL: 250 MG/DL
HCT VFR BLD CALC: 38.3 % — SIGNIFICANT CHANGE UP (ref 34.5–45)
HGB BLD-MCNC: 13.3 G/DL — SIGNIFICANT CHANGE UP (ref 11.5–15.5)
IMM GRANULOCYTES NFR BLD AUTO: 0.7 % — SIGNIFICANT CHANGE UP (ref 0–0.9)
INR BLD: 0.99 RATIO — SIGNIFICANT CHANGE UP (ref 0.85–1.16)
KETONES UR-MCNC: ABNORMAL MG/DL
LEUKOCYTE ESTERASE UR-ACNC: NEGATIVE — SIGNIFICANT CHANGE UP
LYMPHOCYTES # BLD AUTO: 0.92 K/UL — LOW (ref 1–3.3)
LYMPHOCYTES # BLD AUTO: 6 % — LOW (ref 13–44)
MAGNESIUM SERPL-MCNC: 2.7 MG/DL — HIGH (ref 1.6–2.6)
MCHC RBC-ENTMCNC: 28 PG — SIGNIFICANT CHANGE UP (ref 27–34)
MCHC RBC-ENTMCNC: 34.7 G/DL — SIGNIFICANT CHANGE UP (ref 32–36)
MCV RBC AUTO: 80.6 FL — SIGNIFICANT CHANGE UP (ref 80–100)
MONOCYTES # BLD AUTO: 1.12 K/UL — HIGH (ref 0–0.9)
MONOCYTES NFR BLD AUTO: 7.3 % — SIGNIFICANT CHANGE UP (ref 2–14)
NEUTROPHILS # BLD AUTO: 13.11 K/UL — HIGH (ref 1.8–7.4)
NEUTROPHILS NFR BLD AUTO: 85.8 % — HIGH (ref 43–77)
NITRITE UR-MCNC: NEGATIVE — SIGNIFICANT CHANGE UP
NRBC # BLD: 0 /100 WBCS — SIGNIFICANT CHANGE UP (ref 0–0)
NRBC BLD-RTO: 0 /100 WBCS — SIGNIFICANT CHANGE UP (ref 0–0)
PH UR: 5.5 — SIGNIFICANT CHANGE UP (ref 5–8)
PLATELET # BLD AUTO: 185 K/UL — SIGNIFICANT CHANGE UP (ref 150–400)
POTASSIUM SERPL-MCNC: 3.2 MMOL/L — LOW (ref 3.5–5.3)
POTASSIUM SERPL-SCNC: 3.2 MMOL/L — LOW (ref 3.5–5.3)
PROT SERPL-MCNC: 7.2 G/DL — SIGNIFICANT CHANGE UP (ref 6–8.3)
PROT UR-MCNC: 30 MG/DL
PROTHROM AB SERPL-ACNC: 11.6 SEC — SIGNIFICANT CHANGE UP (ref 9.9–13.4)
RBC # BLD: 4.75 M/UL — SIGNIFICANT CHANGE UP (ref 3.8–5.2)
RBC # FLD: 14.1 % — SIGNIFICANT CHANGE UP (ref 10.3–14.5)
SODIUM SERPL-SCNC: 139 MMOL/L — SIGNIFICANT CHANGE UP (ref 135–145)
SP GR SPEC: 1.02 — SIGNIFICANT CHANGE UP (ref 1–1.03)
UROBILINOGEN FLD QL: 0.2 MG/DL — SIGNIFICANT CHANGE UP (ref 0.2–1)
WBC # BLD: 15.28 K/UL — HIGH (ref 3.8–10.5)
WBC # FLD AUTO: 15.28 K/UL — HIGH (ref 3.8–10.5)

## 2025-02-02 PROCEDURE — 71250 CT THORAX DX C-: CPT | Mod: 26

## 2025-02-02 PROCEDURE — 70450 CT HEAD/BRAIN W/O DYE: CPT | Mod: 26

## 2025-02-02 PROCEDURE — 93970 EXTREMITY STUDY: CPT | Mod: 26

## 2025-02-02 PROCEDURE — 73502 X-RAY EXAM HIP UNI 2-3 VIEWS: CPT | Mod: 26,RT

## 2025-02-02 PROCEDURE — 72125 CT NECK SPINE W/O DYE: CPT | Mod: 26

## 2025-02-02 PROCEDURE — 74176 CT ABD & PELVIS W/O CONTRAST: CPT | Mod: 26

## 2025-02-02 PROCEDURE — 99285 EMERGENCY DEPT VISIT HI MDM: CPT

## 2025-02-02 PROCEDURE — 73552 X-RAY EXAM OF FEMUR 2/>: CPT | Mod: 26,RT

## 2025-02-02 PROCEDURE — 99223 1ST HOSP IP/OBS HIGH 75: CPT | Mod: GC

## 2025-02-02 PROCEDURE — 71045 X-RAY EXAM CHEST 1 VIEW: CPT | Mod: 26

## 2025-02-02 RX ORDER — HYDROMORPHONE HYDROCHLORIDE 4 MG/ML
0.5 INJECTION, SOLUTION INTRAMUSCULAR; INTRAVENOUS; SUBCUTANEOUS EVERY 6 HOURS
Refills: 0 | Status: DISCONTINUED | OUTPATIENT
Start: 2025-02-02 | End: 2025-02-03

## 2025-02-02 RX ORDER — BACTERIOSTATIC SODIUM CHLORIDE 0.9 %
1000 VIAL (ML) INJECTION ONCE
Refills: 0 | Status: COMPLETED | OUTPATIENT
Start: 2025-02-02 | End: 2025-02-02

## 2025-02-02 RX ORDER — ACETAMINOPHEN 160 MG/5ML
650 SUSPENSION ORAL EVERY 6 HOURS
Refills: 0 | Status: DISCONTINUED | OUTPATIENT
Start: 2025-02-02 | End: 2025-02-03

## 2025-02-02 RX ORDER — MORPHINE SULFATE 60 MG/1
4 TABLET, FILM COATED, EXTENDED RELEASE ORAL ONCE
Refills: 0 | Status: DISCONTINUED | OUTPATIENT
Start: 2025-02-02 | End: 2025-02-02

## 2025-02-02 RX ORDER — FAMOTIDINE 10 MG/ML
1 INJECTION INTRAVENOUS
Refills: 0 | DISCHARGE

## 2025-02-02 RX ORDER — ACETAMINOPHEN, DIPHENHYDRAMINE HCL, PHENYLEPHRINE HCL 325; 25; 5 MG/1; MG/1; MG/1
3 TABLET ORAL AT BEDTIME
Refills: 0 | Status: DISCONTINUED | OUTPATIENT
Start: 2025-02-02 | End: 2025-02-03

## 2025-02-02 RX ORDER — POTASSIUM CHLORIDE 750 MG/1
10 TABLET, EXTENDED RELEASE ORAL
Refills: 0 | Status: COMPLETED | OUTPATIENT
Start: 2025-02-02 | End: 2025-02-02

## 2025-02-02 RX ORDER — MAGNESIUM, ALUMINUM HYDROXIDE 200-225/5
30 SUSPENSION, ORAL (FINAL DOSE FORM) ORAL EVERY 4 HOURS
Refills: 0 | Status: DISCONTINUED | OUTPATIENT
Start: 2025-02-02 | End: 2025-02-03

## 2025-02-02 RX ORDER — ROSUVASTATIN CALCIUM 10 MG/1
5 TABLET, FILM COATED ORAL AT BEDTIME
Refills: 0 | Status: DISCONTINUED | OUTPATIENT
Start: 2025-02-02 | End: 2025-02-03

## 2025-02-02 RX ORDER — BACTERIOSTATIC SODIUM CHLORIDE 0.9 %
1000 VIAL (ML) INJECTION
Refills: 0 | Status: DISCONTINUED | OUTPATIENT
Start: 2025-02-02 | End: 2025-02-03

## 2025-02-02 RX ORDER — CEFAZOLIN SODIUM IN 0.9 % NACL 2 G/10 ML
2000 SYRINGE (ML) INTRAVENOUS ONCE
Refills: 0 | Status: COMPLETED | OUTPATIENT
Start: 2025-02-03 | End: 2025-02-03

## 2025-02-02 RX ORDER — HEPARIN SODIUM,PORCINE 10000/ML
5000 VIAL (ML) INJECTION ONCE
Refills: 0 | Status: COMPLETED | OUTPATIENT
Start: 2025-02-02 | End: 2025-02-02

## 2025-02-02 RX ORDER — BACTERIOSTATIC SODIUM CHLORIDE 0.9 %
1000 VIAL (ML) INJECTION
Refills: 0 | Status: DISCONTINUED | OUTPATIENT
Start: 2025-02-02 | End: 2025-02-02

## 2025-02-02 RX ORDER — ESCITALOPRAM 10 MG/1
1 TABLET, FILM COATED ORAL
Refills: 0 | DISCHARGE

## 2025-02-02 RX ORDER — CALCIUM CARBONATE/VITAMIN D3 600 MG-10
1 TABLET ORAL DAILY
Refills: 0 | Status: DISCONTINUED | OUTPATIENT
Start: 2025-02-02 | End: 2025-02-03

## 2025-02-02 RX ORDER — ROSUVASTATIN CALCIUM 10 MG/1
1 TABLET, FILM COATED ORAL
Refills: 0 | DISCHARGE

## 2025-02-02 RX ORDER — TRAMADOL HYDROCHLORIDE 100 MG/1
50 TABLET, EXTENDED RELEASE ORAL EVERY 6 HOURS
Refills: 0 | Status: DISCONTINUED | OUTPATIENT
Start: 2025-02-02 | End: 2025-02-03

## 2025-02-02 RX ORDER — ESCITALOPRAM 10 MG/1
20 TABLET, FILM COATED ORAL DAILY
Refills: 0 | Status: DISCONTINUED | OUTPATIENT
Start: 2025-02-02 | End: 2025-02-03

## 2025-02-02 RX ORDER — AMLODIPINE BESYLATE 5 MG
10 TABLET ORAL DAILY
Refills: 0 | Status: DISCONTINUED | OUTPATIENT
Start: 2025-02-02 | End: 2025-02-03

## 2025-02-02 RX ADMIN — Medication 5000 UNIT(S): at 22:39

## 2025-02-02 RX ADMIN — POTASSIUM CHLORIDE 100 MILLIEQUIVALENT(S): 750 TABLET, EXTENDED RELEASE ORAL at 14:36

## 2025-02-02 RX ADMIN — TRAMADOL HYDROCHLORIDE 50 MILLIGRAM(S): 100 TABLET, EXTENDED RELEASE ORAL at 19:50

## 2025-02-02 RX ADMIN — ROSUVASTATIN CALCIUM 5 MILLIGRAM(S): 10 TABLET, FILM COATED ORAL at 22:40

## 2025-02-02 RX ADMIN — TRAMADOL HYDROCHLORIDE 50 MILLIGRAM(S): 100 TABLET, EXTENDED RELEASE ORAL at 20:50

## 2025-02-02 RX ADMIN — Medication 1000 MILLILITER(S): at 13:39

## 2025-02-02 RX ADMIN — Medication 50 MILLILITER(S): at 19:24

## 2025-02-02 RX ADMIN — POTASSIUM CHLORIDE 100 MILLIEQUIVALENT(S): 750 TABLET, EXTENDED RELEASE ORAL at 16:06

## 2025-02-02 RX ADMIN — POTASSIUM CHLORIDE 100 MILLIEQUIVALENT(S): 750 TABLET, EXTENDED RELEASE ORAL at 19:23

## 2025-02-02 NOTE — ED ADULT NURSE NOTE - NSFALLRISKINTERV_ED_ALL_ED
Assistance OOB with selected safe patient handling equipment if applicable/Assistance with ambulation/Communicate fall risk and risk factors to all staff, patient, and family/Monitor gait and stability/Provide visual cue: yellow wristband, yellow gown, etc/Reinforce activity limits and safety measures with patient and family/Call bell, personal items and telephone in reach/Instruct patient to call for assistance before getting out of bed/chair/stretcher/Non-slip footwear applied when patient is off stretcher/[place] to call system/Physically safe environment - no spills, clutter or unnecessary equipment/Purposeful Proactive Rounding/Room/bathroom lighting operational, light cord in reach

## 2025-02-02 NOTE — PRE-OP CHECKLIST - LAST TOOK
Pt's son asking for pre visit labs appointment 12/09- please order and send message to  to schedule   solids clears

## 2025-02-02 NOTE — PRE-OP CHECKLIST - MEDICAL/PEDIATRIC CLEARANCE ON MEDICAL RECORD
Patient is currently hemodynamically stable. Patient is medically optimized at this time and understands the inherent risks of surgery. Patient is currently hemodynamically stable. Patient is medically optimized at this time and understands the inherent risks of surgery. Kvng Baez)  (Signed 02-Feb-2025 16:33)

## 2025-02-02 NOTE — ED PROVIDER NOTE - OBJECTIVE STATEMENT
79F with PMh of HTN, HLD who presents with fall, right hip pain and prolonged down time. patient states that thursday morning she got out of bed and slipped and fell immediately injuring her right hip and has been laying on the ground since then. she was been unable to get up on her own, and today her son came to visit and found her. she denies AC use or LOC, mostly complaining of right hip pain.

## 2025-02-02 NOTE — H&P ADULT - NSHPPHYSICALEXAM_GEN_ALL_CORE
T(C): 36.5 (02-02-25 @ 15:50), Max: 36.5 (02-02-25 @ 15:50)  HR: 69 (02-02-25 @ 15:50) (69 - 79)  BP: 142/56 (02-02-25 @ 15:50) (127/54 - 180/84)  RR: 18 (02-02-25 @ 15:50) (18 - 22)  SpO2: 100% (02-02-25 @ 15:50) (92% - 100%)    GENERAL: patient appears well, no acute distress, appropriately interactive  EYES: sclera clear, no exudates  ENMT: oropharynx clear without erythema, no exudates, moist mucous membranes  LUNGS: good air entry bilaterally, clear to auscultation, symmetric breath sounds, no wheezing or rhonchi appreciated  HEART: soft S1/S2, regular rate and rhythm, no lower extremity edema  GASTROINTESTINAL: abdomen is soft, nontender, nondistended  INTEGUMENT: warm skin, appears well perfused  MUSCULOSKELETAL: R hip shortened and externally rotated, sensation and pulses intact  NEUROLOGIC: awake, alert, responding to questions and following commands appropriately  HEME/LYMPH: no obvious ecchymosis or petechiae T(C): 36.5 (02-02-25 @ 15:50), Max: 36.5 (02-02-25 @ 15:50)  HR: 69 (02-02-25 @ 15:50) (69 - 79)  BP: 142/56 (02-02-25 @ 15:50) (127/54 - 180/84)  RR: 18 (02-02-25 @ 15:50) (18 - 22)  SpO2: 100% (02-02-25 @ 15:50) (92% - 100%)    GENERAL: patient appears well, no acute distress, appropriately interactive  EYES: sclera clear, no exudates  ENMT: oropharynx clear without erythema, no exudates, moist mucous membranes  LUNGS: good air entry bilaterally, clear to auscultation, symmetric breath sounds, no wheezing or rhonchi appreciated  HEART: soft S1/S2, regular rate and rhythm, +systolic murmur c/w AS, no lower extremity edema  GASTROINTESTINAL: abdomen is soft, nontender, nondistended  INTEGUMENT: warm skin, appears well perfused  MUSCULOSKELETAL: R hip shortened and externally rotated, sensation and pulses intact  NEUROLOGIC: awake, alert, responding to questions and following commands appropriately  HEME/LYMPH: no obvious ecchymosis or petechiae

## 2025-02-02 NOTE — H&P ADULT - PROBLEM SELECTOR PLAN 4
Chronic issue  - BP on admission:  - C/w home _______ w/ hold parameters  - Continue to monitor routine hemodynamics Chronic issue  - BP on admission: 180/84  - C/w home amlodipine 10mg qd w/ hold parameters  - Continue to monitor routine hemodynamics

## 2025-02-02 NOTE — ED PROVIDER NOTE - CLINICAL SUMMARY MEDICAL DECISION MAKING FREE TEXT BOX
79F with PMh of HTN, HLD who presents with fall, right hip pain and prolonged down time. patient states that thursday morning she got out of bed and slipped and fell immediately injuring her right hip and has been laying on the ground since then. she was been unable to get up on her own, and today her son came to visit and found her. she denies AC use or LOC, mostly complaining of right hip pain.    plan for labs, IVF, imaging, analgesia. high suspicion for hip fracture, consults as needed 79F with PMh of HTN, HLD who presents with fall, right hip pain and prolonged down time. patient states that thursday morning she got out of bed and slipped and fell immediately injuring her right hip and has been laying on the ground since then. she was been unable to get up on her own, and today her son came to visit and found her. she denies AC use or LOC, mostly complaining of right hip pain.    plan for labs, IVF, imaging, analgesia. high suspicion for hip fracture, consults as needed    patient found with right proximal femur fracture, and elevated CK to 1200, will require admission and will need orthopedic surgery for her femur fracture. patient is aware of her results and in agreement with the plan. currently pain is controlled and remains neurovascularly intact

## 2025-02-02 NOTE — ED ADULT TRIAGE NOTE - CHIEF COMPLAINT QUOTE
fall 2 days ago. pt was on floor for 2 days. Son called 911. pt was given 50mcg fentanyl total by EMS. IV L AC.

## 2025-02-02 NOTE — H&P ADULT - PROBLEM SELECTOR PLAN 5
Chronic issue  - C/w home __________  - Lipid panel in AM Chronic issue  - C/w home Crestor 5mg qHS  - Lipid panel in AM

## 2025-02-02 NOTE — H&P ADULT - PROBLEM SELECTOR PLAN 3
Pt w/ extensive aortic valve leaflet calcifications visualized on CT imaging  - Cardiology ([facility]) consulted -> f/u recs Pt w/ extensive aortic valve leaflet calcifications visualized on CT imaging  - Patient reports known history of aortic stenosis murmur; follows with Dr. Villalobos (last seen 9/16/24)  - Patient denies any recent syncope, angina, dyspnea  - Cardiology ([facility]) consulted -> f/u recs Pt w/ extensive aortic valve leaflet calcifications visualized on CT imaging  - Patient reports known history of aortic stenosis murmur; follows with Dr. Villalobos (last seen 9/16/24)  - Patient denies any recent syncope, angina, dyspnea  - F/u TTE  - Cardiology ([facility]) consulted -> f/u recs

## 2025-02-02 NOTE — H&P ADULT - PROBLEM SELECTOR PLAN 8
Patient unable to recall exact dosing of home meds  - Discussed with patient medications and dosing based on chart from 2023; cross referenced with [email or web address]  - Lexapro and Pepcid dosing confirmed via [facility], will continue for now  - Patient previously on HCTZ 25mg qd, pt does not endorse taking it but unsure - per cardio via [facility] active med  - Patient pharmacy currently closed, primary team to f/u regarding med rec Patient unable to recall exact dosing of home meds  - Discussed with patient medications and dosing based on chart from 2023; cross referenced with [employer]  - Lexapro and Pepcid dosing via [facility], will continue for now  - Patient previously on HCTZ 25mg qd, pt does not endorse taking it but unsure - per cardio via [employer] active med  - Patient pharmacy currently closed, primary team to f/u regarding med rec

## 2025-02-02 NOTE — H&P ADULT - PROBLEM SELECTOR PLAN 7
Chronic issue  - Per chart review on [employer], currently on Lexapro 20mg qd  - C/w home medication

## 2025-02-02 NOTE — ED ADULT NURSE NOTE - OBJECTIVE STATEMENT
Pt. received alert and oriented x4 with chief complaint of slip and fall while at home hurting right hip and being unable to get up for almost three days. Pt. presents w/ right side shortened hip and distended suprapubic area. Pt. given 50 mcg of fentanyl IV push via EMS prior to arrival. Pt. placed on continuous cardiac monitor with continuous pulse ox. EKG performed at bedside upon arrival.

## 2025-02-02 NOTE — ED ADULT TRIAGE NOTE - HEART RATE (BEATS/MIN)
V visit at home     Chief Complaint  Video is participating this visit as well  Feels like drinks 10 cups of coffee , jittery   Wide awake , even physically feels tired  Shoulder Surgery 1/12/2021,   D/C  Gabapentin  Agitation, activation SE   S/P shoulder surgery , Tramadol non costen PRN   Start Elavil 10 mg take 10 mg ot 20 mg Pt has issues with sleep and pain      More agitated , stressed, low energy, sleeping 10 hours , aggravated  At work due to CoVid situation , motivation is low   talked to pt and wife on the phone   Pt now on Zoloft 50 mg only ,   Irritable, less focused, obsessive   Irritable , vitamins , coffee , increased , 20 oz a day   Feels more clear and awake as well as energy , sleeping only couple hours . RLS  When sitting at computer .   Functions at work , hard to prioritize, no ADD   Type A personality        Pt c/o physically stressed due to finances , not working full hours , mother was at the hospital, kids is a mix ,  Groggy, late, sleep late.    Wife is Haily , give input today    Stress PSA are going , ongoing life situations   Excessive sleepiness, low motivation, tired, overwhelmed, procrastinates, dgn with sleep apnea about one year ago, c-pap not much help   Feels not as much in control in his life , 20 years ago was less financial pressures , Home schooling children ,      Reason For Visit    Patient presents for follow-up .   Chaperone: FRANCISCO MCKENNA is accompanied by his spouse and Adeola.        History of Present Illness    Attends our office since November 3, 2003. At that time she has been already taking Zoloft . Ambien was added for sleep. In 2010 he also was attempted on the gabapentin for anxiety . . In 2010 patient had surgery for back pain and has been also on pain medications. After successful surgery his anxiety subsided and patient no longer of need to take gabapentin and maintained on Zoloft.         Past Psych History    Has been prescribed by primary care physician  Zoloft in 2001. History is has been  praxis as well as a low-dose father in 2000. In 2001 wife has left him with kids and patient has been missing his daughters. Also financial stress had followed  In the past patient has been taking gabapentin 2010 time due to anxiety as well as pain during period of back surgery in 2010.      Allergies   1. No Known Drug Allergies         Physical Exam  Psychiatric:. the patient's orientation, memory, attention, language and fund of knowledge were normal. mood and affect were appropriate. the speech was normal. the patient's thought processes were normal. associations were intact with no tangential or circumstantial thinking. no hallucinations or delusions and no homicidal or suicidal thoughts . insight and judgment were intact.      Assessment   1. Depression with anxiety (F41.8)  2. Adjustment reaction to chronic stress (F43.8)    3- Shoulder Surgery 1/12/2021,      History of seasonal allergies.  History of elevated cholesterol  History of taking pain medications due to back surgery in 2010  History of taking Viagra due to stress and also side effects of Zoloft  Currently takes medication for benign prostatic hypertrophy.      Plan    Reduce Effexor and restarted Zoloft.   MEDICATION:   Proper usage and side effects of medications reviewed and discussed.   Alternative treatment options discussed.   Patient verbalized understanding and gave informed consent.    PSYCHOTHERAPY STRATEGIES AND TECHNIQUES USED:   Empathetic Validation .    Interpersonal .    Interpretation .    Psychoeducation .    Reframing .                    1/7/14  Continue Zoloft 200 mg a day. The patient takes Zoloft since 2001  Start BuSpar 5 mg twice a day.    Patient also is advised to pursue psychotherapy to improve coping skills.   Patient is to follow-up with me in several months if he were to continue or adjust new medication BuSpar.   If patient's symptoms subside and he is at the baseline on  Zoloft only he can calm in one year  4/8/15  Lower Zoloft 150 mg  Start Cymbalta 20 mg   F/U in 1 month  5/20/15  Lower Zoloft 100 mg  Advance Cymbalta 30 mg   F/U in 3 month  7/22/15  Lower Zoloft 50 mg  Advance Cymbalta 60 mg   Start Wellbutrin 75 mg   F/U in 1- 2 month  10/27/15  Advance and change form to Wellbutrin  mg   Lower Zoloft 25 mg  Cymbalta 60 mg  Reduce Coffee no more than 2 cups a day  F/U in 2 month  12/22/15  D/C Zoloft   Advance and change form to Wellbutrin  mg   Cymbalta 60 mg   16  Advance and change form to Wellbutrin  mg   Cymbalta 60 mg   f/u in 3 months  5/10/16  Wellbutrin  mg  Cymbalta 60 mg  f/u in 6 months  16  Wellbutrin  mg  Cymbalta 60 mg  reduce coffee   Referral [facility]SE [facility] sleep evaluation and second opinion   f/u in 6 months  17  Lower Wellbutrin  mg, due to palpitation anxiety  Cymbalta 60 mg  reduce coffee , now 4 - 5 times   Referral again JOSEL U[facility] sleep evaluation and second opinion   Anticipates novel ultrasound prostate treatment   f/u in 6 months  17  Lower Wellbutrin  mg, due to palpitation anxiety, stable, to simplify meds , and in hope might help to reduce activation at night time   Cymbalta 60 mg  Diagnosed with mild sleep apnea and is going to use mouth guard to position jaw to open airways  s/p prostate ablation, now PSA down,  f/u in 6 months   18  Start Effexor ER 37.5 mg q D    Cymbalta 60 mg due to no help , plan to cross taper   Lower Wellbutrin 50 mg , due to no help  [facility]   Family counselor in [place], [place]  Address: [address] [place] [address] [address], [place] [address]  Phone: [phone]  f/u in 6 weeks   4/3/18  d/c Wellbutrin   Advance Effexor ER 75 mg   Lower Duloxetine 40 mg ( cross-taper)   Did not go to therapist due to no time, his 10 yo son is sick with autoimmune  and today we have a supportive psychotherapy, relatedness and resources such as support groups    f/u in 6 weeks   5/15/18  Tired in Am , does not want to get out of bed , at night takes no meds ,   Lower Duloxetine 20 mg   Advance Effexor  mg   Start Ambien 5 mg at HS PRN , pt denies ever problem with alcohol , pt advised no alcohol even little that night   Follow-up in 6 weeks  7/24/18  Start Doxepin 10 at HS mg   Advance Effexor  mg  d/c Ambien   Follow-up in 3 months   10/23/18  Continue Effexor  mg. Screen patient for depression today and scored 4 which is below mild depression so medication is not changed but I have referred him to do blood work and if needed to see primary care physician to understand where his fatigue comes from.  Follow-up with me in 3-6 months depending on situation  11/19/18  Lower Effexor  mg, due to pt and wife Feels when was on Zoloft , felt better   Start Zoloft 25 mg   [facility]   Family counselor in [place], [place]  Address: [address] [place] [address] [address], [place] [address]  Phone: [phone]  f/u in 6 weeks   4/9/19  Lower Effexor XR 75 mg, due to pt and wife Feels when was on Zoloft , felt better   Advance  Zoloft 50 mg     Start Provigil 100 mg   C-pap x 2 months , still tired , sleeping a lot of hours , documented sleep apneadgn with sleep apnea about one year ago, c-pap not much help    Provigil is approved by the FDA for the treatment of excessive daytime sleepiness related to narcolepsy, shift work disorder and obstructive sleep apnea. ... Provigil is not a replacement for CPAP or any other treatment prescribed for ILEANA.  7/12/19  Lower Effexor XR 37.5 mg   Advance  Zoloft 100 mg  Provigil 100 mg   Today CBT    10/15/19  Lower Effexor XR 25 mg  Advance  Zoloft 150 mg  Provigil 100 mg, continue , think to advance if needed next time    Today CBT  Will see Sleep specialist Neurologist Sr. Plummer   Also PSA went up , pt will have repeated biopsy in December 1/28/20  Advance Provigil 200 mg q D C-pap x 2 months , still tired , sleeping  a lot of hours , low energy drag , no motivation, sleeping 12 -14 hours   D/C Effexor   Lower Zoft 100 mg , due no help       2nd opinion ref to   Alfonso Cross M.D.    Psychiatrist in [place], [place]  Address: [address], [place] [address]  Phone: [phone]    [facility] for therapists    4/28/2020  Lower  Provigil 100 mg q D C-pap x 2 months and get off  , still tired , sleeping a lot of hours , low energy drag , no motivation, sleeping 12 -14 hours   D/C Effexor   Lower Zoft 50 mg , due no help , Lower Zoloft to 50 mg x 2 weeks , then d/c   Lower Provigil 100 mg x 1 week , then d/c    Ref Diomedes eWlch , Ph D , CBT   Please make appointment with Diomedes Welch Ph D for CBT    5/19/2020    talked to pt and wife on the phone   D/C Zoloft on Zoloft 25 mg only ,   Irritable, less focused, obsessive   Irritable , vitamins , coffee , increased , 20 oz a day   Feels more clear and awake as well as energy , sleeping only couple hours . RLS  When sitting at computer .   Functions at work , hard to prioritize, no ADD   Type A personality    Advised to lower Coffee,   Sleep apnea , was taking melatonin ,   Start Gabapentin 200 mg 9  pm , goes to sleep 11 Pm   Zolpidem 5/2018 mg per his PCP Doxepin      7/29/2020  Now back on Zoloft  50 mg due to sx escalated , mood is stabilizing after a couple weeks. It's second time pt attempts to stop Zoloft , both time resulted in depression   Pt was ot able to tolerated taper from Zoloft and when on Gabapentin , gradually became more irritable, disforic    was off Zoloft has had numbness in extremities, worth when is asleep    Pt has started Gabapentin same time stopped Zoloft , and was sooo irritated. Yells ,screams , so he realized has to return back on his meds like Zoloft   Sleep is ok , toss and turn  , and Zolpidem helps   Fall suggest vit D     10/27/2020  Advance Zoloft 100 mg q D   Think Lamotrigine , dicussed with pt options   More agitated , stressed, low  energy, sleeping 10 hours , aggravated  At work due to CoVid situation , motivation is low     12/23/2020  Zoloft 100 mg Pt seem to be OK, attention concentration has improved   Ortho [place] at a [facility] [place] [facility]   Start Gabapentin 100 mg x 4 tabs a day   Stat for anxiety , and improve sleep and also perhaps pain from shoulder while waiting for MRI results due to fall     Plan to start Ritalin    Pt c/o physically stressed due to finances , not working full hours , mother was at the hospital, kids is a mix ,  Groggy, late, sleep late.    Wife is Haily , give input today    2/10/2021  Zoloft 100 mg q AM Pt seem to be OK, attention concentration has improved   Ortho [facility] at a walk in clinic [facility] [place] [facility]   D/C  Gabapentin  Agitation, activation SE   S/P shoulder surgery , Tramadol non costen PRN   Start Elavil 10 mg take 10 mg ot 20 mg Pt has issues with sleep and pain and reduce frequent urination   Zolpidem pt takes   Pt denies heart condition, not using alcohol or cannabis         Time Video Visit , pt at home     Total face to face time spent with patient 18 minutes.   Total psychotherapy time: 16 minutes.           73

## 2025-02-02 NOTE — H&P ADULT - ASSESSMENT
80yo F PMHx HTN, HLD who presents with fall, right hip pain and prolonged down time s/p fall. Admitted for operative management of R hip fracture.

## 2025-02-02 NOTE — H&P ADULT - PROBLEM SELECTOR PLAN 1
Pt presenting to ED s/p fall on R hip Thursday  - XR Hip w/ Pelvis, Femur: Acute severely comminuted and moderately displaced intertrochanteric fracture of the right femur. No dislocation.  - CT C/A/P: Markedly comminuted and angulated right intertrochanteric femoral fracture. No additional acute traumatic injury identified within the chest, abdomen, or pelvis.  - Orthopedic surgery consulted -> recs appreciated  - Plan for OR tomorrow with Dr. Bazzi  - NWB RLE  - Pain control:   - NPO after midnight, except medications  - Hold chemical DVT ppx after midnight, SCDs OK per ortho  - F/u b/l LE doppler to r/o DVT  - Patient currently has no active cardiac conditions. No signs of ischemia, ADHF, no unstable arrhythmias noted. Baseline functional capacity is ___ METS. RCRI score is ____. Patient is currently hemodynamically stable. Patient is medically optimized at this time and understands the inherent risks of surgery. Routine hemodynamic monitoring is suggested. Pt presenting to ED s/p fall on R hip Thursday  - XR Hip w/ Pelvis, Femur: Acute severely comminuted and moderately displaced intertrochanteric fracture of the right femur. No dislocation.  - CT C/A/P: Markedly comminuted and angulated right intertrochanteric femoral fracture. No additional acute traumatic injury identified within the chest, abdomen, or pelvis.  - Orthopedic surgery consulted -> recs appreciated  - Plan for OR tomorrow with Dr. Bazzi  - NWB RLE  - Pain control: Tylenol 650mg q6h for mild pain, tramadol 50mg q6h for mod pain, Dilaudid 0.5mg q6h for sev pain  - NPO after midnight, except medications  - Hold chemical DVT ppx after midnight, SCDs OK per ortho  - F/u b/l LE doppler to r/o DVT  - Patient currently has no active cardiac conditions. No signs of ischemia, ADHF, no unstable arrhythmias noted. Baseline functional capacity is >4 METS. RCRI score is 0. Patient is currently hemodynamically stable. Patient is medically optimized at this time and understands the inherent risks of surgery. Routine hemodynamic monitoring is suggested.

## 2025-02-02 NOTE — H&P ADULT - ATTENDING COMMENTS
78yo F PMHx HTN, HLD who presents with fall, right hip pain and prolonged down time s/p fall. Admitted for operative management of R hip fracture.    Pt with AS seen on CT, and has significant murmur, ordered for 2d echo, and will need cards eval before OR.  Pain control with prn dilaudid, tramadol, tylenol.    Kvng Baez, Attending Physician

## 2025-02-02 NOTE — ED PROVIDER NOTE - PHYSICAL EXAMINATION
Gen: Awake, Alert, uncomfortable  Head:  NC/AT  Eyes:  PERRL, EOMI, Conjunctiva pink, no scleral icterus  ENT:  no exudates, no erythema, uvula midline, TMs clear bilaterally, dry mucus membranes  Neck: supple, nontender, no meningismus, no JVD, trachea midline  Cardiac/CV:  S1 S2, RRR, no murmurs  Chest: nontender, no crepitus  Respiratory/Pulm:  CTAB, good air movement, normal resp effort, no wheezes/stridor/retractions/rales/rhonchi  Gastrointestinal/Abdomen:  Soft, nontender, nondistended, no rebound/guarding  Pelvis: stable, nontender, Hips: right hip is shortened and externally rotated, normal sensation and pulse  Ext:  warm, well perfused, moving all extremities spontaneously, no cyanosis, no erythema, no edema, distal pulses intact  Skin: intact, no rash, no petechiae, no ecchymosis  Neuro:  AAOx3, sensation intact, motor 5/5 x 4 extremities, clear speech

## 2025-02-02 NOTE — PATIENT PROFILE ADULT - ..
Condition stable, mild right hand weakness reported in the past.  Somewhat improved currently.  
Condition stable.  
GFR 50 range.  Condition stable.  Most recent serum creatinine 1.23 on January 2023.  
He exercises regularly.  81 mg aspirin will be continued.  
His blood pressure is up today in my office but has been controlled overall.  No med changes made.  Condition controlled.  
Statin therapy to continue.  Most recent LDL 54, pure hypercholesterolemia.  
02-Feb-2025 18:33:53

## 2025-02-02 NOTE — H&P ADULT - HISTORY OF PRESENT ILLNESS
80yo F PMHx HTN, HLD who presents with fall, right hip pain and prolonged down time s/p fall. Patient states that Thursday morning, she got out of bed, slipped and fell immediately injuring her right hip and has been laying on the ground since then. She was been unable to get up on her own, when today her son came to visit and found her. Rates her R hip pain at 6-7/10 currently. Denies AC use, denies LOC, mostly complaining of right hip pain at this time.    In the ED:  T: 97F, HR: 73, BP: 180/84, RR: 22, SpO2: 92% on RA  Labs notable for WBC: 15.28, K: 3.2, BUN/Cr: 29/0.72, glucose: 137, total bili: 2.6, AST/ALT: 65/53, CK: 1423  UA: protein: 30, trace ketones, occasional bacteria, SEC present, glucose: 250, negative for LE + nitrite  EKG: NSR, LVH with repolarization abnormality; cannot rule out Septal infarct, age undetermined - QTc: 473ms  CXR: The heart is unremarkable. The lungs are clear. No acute osseous abnormality.  XR Hip w/ Pelvis, Femur: Acute severely comminuted and moderately displaced intertrochanteric fracture of the right femur. No dislocation.  CT Head: Age-appropriate involutional and ischemic gliotic changes. No change since 11/28/2022. No hemorrhage  CT C-spine: Degenerative changes. No acute fractures or dislocations  CT C/A/P: 1. Markedly comminuted and angulated right intertrochanteric femoral fracture.  2. No additional acute traumatic injury identified within the chest, abdomen, or pelvis.  3. Extensive aortic valve leaflet calcifications are present. Correlation for aortic stenosis is suggested.  Received in the ED: 1L NS bolus x1, IV KCl 10mEq x3 80yo F PMHx HTN, HLD who presents with fall, right hip pain and prolonged down time s/p fall. Patient states that Thursday morning, she got out of bed while changing, slipped and fell immediately injuring her right hip and had been laying on the ground since then. She was unable to get up on her own, when today her son came to visit and found her. She reports she was laying on her back when she was found. Rates her R hip pain at 5/10 currently. Denies any AC use, denies LOC or head trauma at time of fall, primary complaint is right hip pain at this time. Denies any syncopal episodes recently, anginal symptoms or shortness of breath at rest or with exertion.    In the ED:  T: 97F, HR: 73, BP: 180/84, RR: 22, SpO2: 92% on RA  Labs notable for WBC: 15.28, K: 3.2, BUN/Cr: 29/0.72, glucose: 137, total bili: 2.6, AST/ALT: 65/53, CK: 1423  UA: protein: 30, trace ketones, occasional bacteria, SEC present, glucose: 250, negative for LE + nitrite  EKG: NSR, LVH with repolarization abnormality; cannot rule out Septal infarct, age undetermined - QTc: 473ms  CXR: The heart is unremarkable. The lungs are clear. No acute osseous abnormality.  XR Hip w/ Pelvis, Femur: Acute severely comminuted and moderately displaced intertrochanteric fracture of the right femur. No dislocation.  CT Head: Age-appropriate involutional and ischemic gliotic changes. No change since 11/28/2022. No hemorrhage  CT C-spine: Degenerative changes. No acute fractures or dislocations  CT C/A/P: 1. Markedly comminuted and angulated right intertrochanteric femoral fracture.  2. No additional acute traumatic injury identified within the chest, abdomen, or pelvis.  3. Extensive aortic valve leaflet calcifications are present. Correlation for aortic stenosis is suggested.  Received in the ED: 1L NS bolus x1, IV KCl 10mEq x3

## 2025-02-02 NOTE — H&P ADULT - NSHPREVIEWOFSYSTEMS_GEN_ALL_CORE
REVIEW OF SYSTEMS:  CONSTITUTIONAL: No fever, chills or fatigue.  HENMT: No HA, dizziness, rhinorrhea  RESPIRATORY: No cough or shortness of breath.  CARDIOVASCULAR: No chest pain, palpitations.  GASTROINTESTINAL: No abdominal pain. No nausea or vomiting; No diarrhea or constipation.  GENITOURINARY: No dysuria, changes in frequency, hematuria, or incontinence  NEUROLOGICAL: +Decreased strength of RLE 2/2 acute injury. Sensation intact bilaterally.  SKIN: No itching, rashes  MUSCULOSKELETAL: +R hip pain

## 2025-02-02 NOTE — CONSULT NOTE ADULT - SUBJECTIVE AND OBJECTIVE BOX
Pt is a 79y Female who presents to the ED with R hip pain after trip and fall onto R side 3 days ago, Thursday AM 1/30/25. Denies head injury, LOC, other injuries. Was unable to ambulate after due to R hip pain and was found down on the ground after 3 days by family today. Ambulates independently at baseline. Denies numbness, tingling, fevers, chills, CP, SOB, N/V/D.    Vital Signs (24 Hrs):  T(C): 36.1 (02-02-25 @ 12:40), Max: 36.1 (02-02-25 @ 12:40)  HR: 79 (02-02-25 @ 13:15) (73 - 79)  BP: 127/54 (02-02-25 @ 13:15) (127/54 - 180/84)  RR: 20 (02-02-25 @ 13:15) (20 - 22)  SpO2: 98% (02-02-25 @ 13:15) (92% - 98%)    LABS:                        13.3   15.28 )-----------( 185      ( 02 Feb 2025 13:29 )             38.3     02-02    139  |  99  |  29[H]  ----------------------------<  137[H]  3.2[L]   |  29  |  0.72    Ca    9.3      02 Feb 2025 13:29  Mg     2.7     02-02    TPro  7.2  /  Alb  3.1[L]  /  TBili  2.6[H]  /  DBili  x   /  AST  65[H]  /  ALT  53  /  AlkPhos  91  02-02    LIVER FUNCTIONS - ( 02 Feb 2025 13:29 )  Alb: 3.1 g/dL / Pro: 7.2 g/dL / ALK PHOS: 91 U/L / ALT: 53 U/L / AST: 65 U/L / GGT: x           PT/INR - ( 02 Feb 2025 13:29 )   PT: 11.6 sec;   INR: 0.99 ratio         PTT - ( 02 Feb 2025 13:29 )  PTT:29.9 sec    Physical Exam:  General: NAD, pt laying in bed comfortably    RLE:  Skin intact, TTP R hip, extremity shortened and rotated  Compartments soft, compressible  Calves nontender  Motor: +GSC, TA, EHL, FHL  SILT: SPN, DPN, Tib, Saph, Juanito  +DP    Secondary Assessment:  NC/AT, NTTP of clavicles, NTTP of C-,T-,L-Spine, NTTP of Pelvis  UEs: NTTP of Shoulders, Elbows, Wrists, Hands; NT with AROM/PROM of Shoulders, Elbows, Wrists, Hands; AIN/PIN/Med/Uln/Msc/Rad/Ax intact  LLE: Able to SLR, NT with Log Roll, NT with Heel Strike, NTTP of Hip, Knee, Ankle, Foot; NT with AROM/PROM of Hip, Knee, Ankle, Foot; Q/H/Gsc/TA/EHL/FHL intact    Imaging:  XR R Hip: subtroch hip fx (personal read)    A/P:  79y Female who presents with subtroch hip FX    **INCOMPLETE** **INCOMPLETE** **INCOMPLETE** **INCOMPLETE** **INCOMPLETE** **INCOMPLETE**      Pt is a 79y Female who presents to the ED with R hip pain after trip and fall onto R side 3 days ago, Thursday AM 1/30/25. Denies head injury, LOC, other injuries. Was unable to ambulate after due to R hip pain and was found down on the ground after 3 days by family today. Ambulates independently at baseline. Denies numbness, tingling, fevers, chills, CP, SOB, N/V/D.    Vital Signs (24 Hrs):  T(C): 36.1 (02-02-25 @ 12:40), Max: 36.1 (02-02-25 @ 12:40)  HR: 79 (02-02-25 @ 13:15) (73 - 79)  BP: 127/54 (02-02-25 @ 13:15) (127/54 - 180/84)  RR: 20 (02-02-25 @ 13:15) (20 - 22)  SpO2: 98% (02-02-25 @ 13:15) (92% - 98%)    LABS:                        13.3   15.28 )-----------( 185      ( 02 Feb 2025 13:29 )             38.3     02-02    139  |  99  |  29[H]  ----------------------------<  137[H]  3.2[L]   |  29  |  0.72    Ca    9.3      02 Feb 2025 13:29  Mg     2.7     02-02    TPro  7.2  /  Alb  3.1[L]  /  TBili  2.6[H]  /  DBili  x   /  AST  65[H]  /  ALT  53  /  AlkPhos  91  02-02    LIVER FUNCTIONS - ( 02 Feb 2025 13:29 )  Alb: 3.1 g/dL / Pro: 7.2 g/dL / ALK PHOS: 91 U/L / ALT: 53 U/L / AST: 65 U/L / GGT: x           PT/INR - ( 02 Feb 2025 13:29 )   PT: 11.6 sec;   INR: 0.99 ratio         PTT - ( 02 Feb 2025 13:29 )  PTT:29.9 sec    Physical Exam:  General: NAD, pt laying in bed comfortably    RLE:  Skin intact, TTP R hip, extremity shortened and rotated  Compartments soft, compressible  Calves nontender  Motor: +GSC, TA, EHL, FHL  SILT: SPN, DPN, Tib, Saph, Juanito  +DP    Secondary Assessment:  NC/AT, NTTP of clavicles, NTTP of C-,T-,L-Spine, NTTP of Pelvis  UEs: NTTP of Shoulders, Elbows, Wrists, Hands; NT with AROM/PROM of Shoulders, Elbows, Wrists, Hands; AIN/PIN/Med/Uln/Msc/Rad/Ax intact  LLE: Able to SLR, NT with Log Roll, NT with Heel Strike, NTTP of Hip, Knee, Ankle, Foot; NT with AROM/PROM of Hip, Knee, Ankle, Foot; Q/H/Gsc/TA/EHL/FHL intact    Imaging:  XR R Hip: subtroch hip fx (personal read)    A/P:  79y Female who presents with subtroch hip FX    Plan for OR tomorrow with Dr. Bazzi  NWB RLE  Analgesics  NPO after midnight, except medications  Followup AM labs  Hold chemical DVT ppx after midnight, SCDs OK  Please document medical, cardiac clearance/optimization for OR  Discussed plan with Dr. Bazzi who agrees with above

## 2025-02-02 NOTE — H&P ADULT - PROBLEM SELECTOR PLAN 2
Pt s/p fall on Thursday w/ R hip injury found ~3 days later by son  - CK: 1423  - S/p 1L NS bolus x1 in ED  - C/w IVF --> NS @ 50mL/hr  x20 hours  - Monitor routine CMP  - F/u AM CK  - F/u b/l LE doppler to r/o DVT Pt s/p fall on Thursday w/ R hip injury found ~3 days later by son  - CK: 1423  - S/p 1L NS bolus x1 in ED  - C/w IVF --> NS @ 50mL/hr  x20 hours  - Monitor routine CMP  - F/u AM CK  - F/u b/l LE doppler to r/o DVT  - Nephrology (Dr. Hurtado) consulted -> f/u recs

## 2025-02-02 NOTE — H&P ADULT - NSHPSOCIALHISTORY_GEN_ALL_CORE
Tobacco:   EtOH:   Recreational drug use:  Lives with:  Ambulates:  ADLs: Tobacco: former smoker, 1 PPD starting age 15, quit at 39yo  EtOH: social  Recreational drug use: denies  Lives by herself  Ambulates independently  Independent in ADLs

## 2025-02-03 ENCOUNTER — RESULT REVIEW (OUTPATIENT)
Age: 80
End: 2025-02-03

## 2025-02-03 DIAGNOSIS — S72.141A DISPLACED INTERTROCHANTERIC FRACTURE OF RIGHT FEMUR, INITIAL ENCOUNTER FOR CLOSED FRACTURE: ICD-10-CM

## 2025-02-03 DIAGNOSIS — S72.21XA DISPLACED SUBTROCHANTERIC FRACTURE OF RIGHT FEMUR, INITIAL ENCOUNTER FOR CLOSED FRACTURE: ICD-10-CM

## 2025-02-03 PROBLEM — Z78.9 OTHER SPECIFIED HEALTH STATUS: Chronic | Status: INACTIVE | Noted: 2022-11-28 | Resolved: 2025-02-02

## 2025-02-03 LAB
ABO RH CONFIRMATION: SIGNIFICANT CHANGE UP
ALBUMIN SERPL ELPH-MCNC: 2.7 G/DL — LOW (ref 3.3–5)
ALP SERPL-CCNC: 72 U/L — SIGNIFICANT CHANGE UP (ref 40–120)
ALT FLD-CCNC: 43 U/L — SIGNIFICANT CHANGE UP (ref 12–78)
ANION GAP SERPL CALC-SCNC: 3 MMOL/L — LOW (ref 5–17)
ANION GAP SERPL CALC-SCNC: 4 MMOL/L — LOW (ref 5–17)
ANION GAP SERPL CALC-SCNC: 5 MMOL/L — SIGNIFICANT CHANGE UP (ref 5–17)
APTT BLD: 27.4 SEC — SIGNIFICANT CHANGE UP (ref 24.5–35.6)
AST SERPL-CCNC: 57 U/L — HIGH (ref 15–37)
BASOPHILS # BLD AUTO: 0.02 K/UL — SIGNIFICANT CHANGE UP (ref 0–0.2)
BASOPHILS NFR BLD AUTO: 0.2 % — SIGNIFICANT CHANGE UP (ref 0–2)
BILIRUB SERPL-MCNC: 2.5 MG/DL — HIGH (ref 0.2–1.2)
BUN SERPL-MCNC: 29 MG/DL — HIGH (ref 7–23)
BUN SERPL-MCNC: 30 MG/DL — HIGH (ref 7–23)
BUN SERPL-MCNC: 31 MG/DL — HIGH (ref 7–23)
CALCIUM SERPL-MCNC: 8.4 MG/DL — LOW (ref 8.5–10.1)
CALCIUM SERPL-MCNC: 8.6 MG/DL — SIGNIFICANT CHANGE UP (ref 8.5–10.1)
CALCIUM SERPL-MCNC: 8.7 MG/DL — SIGNIFICANT CHANGE UP (ref 8.5–10.1)
CHLORIDE SERPL-SCNC: 102 MMOL/L — SIGNIFICANT CHANGE UP (ref 96–108)
CHLORIDE SERPL-SCNC: 103 MMOL/L — SIGNIFICANT CHANGE UP (ref 96–108)
CHLORIDE SERPL-SCNC: 103 MMOL/L — SIGNIFICANT CHANGE UP (ref 96–108)
CHOLEST SERPL-MCNC: 181 MG/DL — SIGNIFICANT CHANGE UP
CK SERPL-CCNC: 1153 U/L — HIGH (ref 26–192)
CO2 SERPL-SCNC: 29 MMOL/L — SIGNIFICANT CHANGE UP (ref 22–31)
CO2 SERPL-SCNC: 31 MMOL/L — SIGNIFICANT CHANGE UP (ref 22–31)
CO2 SERPL-SCNC: 32 MMOL/L — HIGH (ref 22–31)
CREAT SERPL-MCNC: 0.56 MG/DL — SIGNIFICANT CHANGE UP (ref 0.5–1.3)
CREAT SERPL-MCNC: 0.57 MG/DL — SIGNIFICANT CHANGE UP (ref 0.5–1.3)
CREAT SERPL-MCNC: 0.82 MG/DL — SIGNIFICANT CHANGE UP (ref 0.5–1.3)
EGFR: 73 ML/MIN/1.73M2 — SIGNIFICANT CHANGE UP
EGFR: 92 ML/MIN/1.73M2 — SIGNIFICANT CHANGE UP
EGFR: 93 ML/MIN/1.73M2 — SIGNIFICANT CHANGE UP
EOSINOPHIL # BLD AUTO: 0.24 K/UL — SIGNIFICANT CHANGE UP (ref 0–0.5)
EOSINOPHIL NFR BLD AUTO: 1.9 % — SIGNIFICANT CHANGE UP (ref 0–6)
GLUCOSE SERPL-MCNC: 111 MG/DL — HIGH (ref 70–99)
GLUCOSE SERPL-MCNC: 116 MG/DL — HIGH (ref 70–99)
GLUCOSE SERPL-MCNC: 237 MG/DL — HIGH (ref 70–99)
HCT VFR BLD CALC: 30.8 % — LOW (ref 34.5–45)
HCT VFR BLD CALC: 33.3 % — LOW (ref 34.5–45)
HCT VFR BLD CALC: 33.6 % — LOW (ref 34.5–45)
HDLC SERPL-MCNC: 47 MG/DL — LOW
HGB BLD-MCNC: 10.4 G/DL — LOW (ref 11.5–15.5)
HGB BLD-MCNC: 11.3 G/DL — LOW (ref 11.5–15.5)
HGB BLD-MCNC: 11.5 G/DL — SIGNIFICANT CHANGE UP (ref 11.5–15.5)
IMM GRANULOCYTES NFR BLD AUTO: 0.5 % — SIGNIFICANT CHANGE UP (ref 0–0.9)
INR BLD: 1.01 RATIO — SIGNIFICANT CHANGE UP (ref 0.85–1.16)
LIPID PNL WITH DIRECT LDL SERPL: 97 MG/DL — SIGNIFICANT CHANGE UP
LYMPHOCYTES # BLD AUTO: 1.02 K/UL — SIGNIFICANT CHANGE UP (ref 1–3.3)
LYMPHOCYTES # BLD AUTO: 8.1 % — LOW (ref 13–44)
MAGNESIUM SERPL-MCNC: 2.7 MG/DL — HIGH (ref 1.6–2.6)
MCHC RBC-ENTMCNC: 27.7 PG — SIGNIFICANT CHANGE UP (ref 27–34)
MCHC RBC-ENTMCNC: 27.8 PG — SIGNIFICANT CHANGE UP (ref 27–34)
MCHC RBC-ENTMCNC: 27.8 PG — SIGNIFICANT CHANGE UP (ref 27–34)
MCHC RBC-ENTMCNC: 33.8 G/DL — SIGNIFICANT CHANGE UP (ref 32–36)
MCHC RBC-ENTMCNC: 33.9 G/DL — SIGNIFICANT CHANGE UP (ref 32–36)
MCHC RBC-ENTMCNC: 34.2 G/DL — SIGNIFICANT CHANGE UP (ref 32–36)
MCV RBC AUTO: 81.4 FL — SIGNIFICANT CHANGE UP (ref 80–100)
MCV RBC AUTO: 81.8 FL — SIGNIFICANT CHANGE UP (ref 80–100)
MCV RBC AUTO: 82.1 FL — SIGNIFICANT CHANGE UP (ref 80–100)
MONOCYTES # BLD AUTO: 1.13 K/UL — HIGH (ref 0–0.9)
MONOCYTES NFR BLD AUTO: 9 % — SIGNIFICANT CHANGE UP (ref 2–14)
NEUTROPHILS # BLD AUTO: 10.13 K/UL — HIGH (ref 1.8–7.4)
NEUTROPHILS NFR BLD AUTO: 80.3 % — HIGH (ref 43–77)
NON HDL CHOLESTEROL: 134 MG/DL — HIGH
NRBC # BLD: 0 /100 WBCS — SIGNIFICANT CHANGE UP (ref 0–0)
NRBC BLD-RTO: 0 /100 WBCS — SIGNIFICANT CHANGE UP (ref 0–0)
PHOSPHATE SERPL-MCNC: 2.4 MG/DL — LOW (ref 2.5–4.5)
PLATELET # BLD AUTO: 157 K/UL — SIGNIFICANT CHANGE UP (ref 150–400)
PLATELET # BLD AUTO: 165 K/UL — SIGNIFICANT CHANGE UP (ref 150–400)
PLATELET # BLD AUTO: 169 K/UL — SIGNIFICANT CHANGE UP (ref 150–400)
POTASSIUM SERPL-MCNC: 3.4 MMOL/L — LOW (ref 3.5–5.3)
POTASSIUM SERPL-MCNC: 3.6 MMOL/L — SIGNIFICANT CHANGE UP (ref 3.5–5.3)
POTASSIUM SERPL-MCNC: 4.1 MMOL/L — SIGNIFICANT CHANGE UP (ref 3.5–5.3)
POTASSIUM SERPL-SCNC: 3.4 MMOL/L — LOW (ref 3.5–5.3)
POTASSIUM SERPL-SCNC: 3.6 MMOL/L — SIGNIFICANT CHANGE UP (ref 3.5–5.3)
POTASSIUM SERPL-SCNC: 4.1 MMOL/L — SIGNIFICANT CHANGE UP (ref 3.5–5.3)
PROT SERPL-MCNC: 5.6 G/DL — LOW (ref 6–8.3)
PROTHROM AB SERPL-ACNC: 11.9 SEC — SIGNIFICANT CHANGE UP (ref 9.9–13.4)
RBC # BLD: 3.75 M/UL — LOW (ref 3.8–5.2)
RBC # BLD: 4.07 M/UL — SIGNIFICANT CHANGE UP (ref 3.8–5.2)
RBC # BLD: 4.13 M/UL — SIGNIFICANT CHANGE UP (ref 3.8–5.2)
RBC # FLD: 14 % — SIGNIFICANT CHANGE UP (ref 10.3–14.5)
SODIUM SERPL-SCNC: 137 MMOL/L — SIGNIFICANT CHANGE UP (ref 135–145)
SODIUM SERPL-SCNC: 137 MMOL/L — SIGNIFICANT CHANGE UP (ref 135–145)
SODIUM SERPL-SCNC: 138 MMOL/L — SIGNIFICANT CHANGE UP (ref 135–145)
TRIGL SERPL-MCNC: 218 MG/DL — HIGH
WBC # BLD: 12.6 K/UL — HIGH (ref 3.8–10.5)
WBC # BLD: 13.82 K/UL — HIGH (ref 3.8–10.5)
WBC # BLD: 17.15 K/UL — HIGH (ref 3.8–10.5)
WBC # FLD AUTO: 12.6 K/UL — HIGH (ref 3.8–10.5)
WBC # FLD AUTO: 13.82 K/UL — HIGH (ref 3.8–10.5)
WBC # FLD AUTO: 17.15 K/UL — HIGH (ref 3.8–10.5)

## 2025-02-03 PROCEDURE — 93306 TTE W/DOPPLER COMPLETE: CPT | Mod: 26

## 2025-02-03 PROCEDURE — 99222 1ST HOSP IP/OBS MODERATE 55: CPT

## 2025-02-03 PROCEDURE — 99233 SBSQ HOSP IP/OBS HIGH 50: CPT

## 2025-02-03 DEVICE — K-WIRE STRYKER 3.2MM X 450MM: Type: IMPLANTABLE DEVICE | Site: RIGHT | Status: FUNCTIONAL

## 2025-02-03 DEVICE — SCREW ALPHA LOKG 5X60MM STRL: Type: IMPLANTABLE DEVICE | Site: RIGHT | Status: FUNCTIONAL

## 2025-02-03 DEVICE — GUIDEWIRE BALL TIP 3MM X 1000MM FOR T2 R1.5 FEMORAL NAILING SYSTEM: Type: IMPLANTABLE DEVICE | Site: RIGHT | Status: FUNCTIONAL

## 2025-02-03 DEVICE — SCREW LAG GAMMA 10.5X105MM STRL: Type: IMPLANTABLE DEVICE | Site: RIGHT | Status: FUNCTIONAL

## 2025-02-03 DEVICE — IMPLANTABLE DEVICE: Type: IMPLANTABLE DEVICE | Site: RIGHT | Status: FUNCTIONAL

## 2025-02-03 DEVICE — ASBLY CBL CBL-RDY 1.8X910MM: Type: IMPLANTABLE DEVICE | Site: RIGHT | Status: FUNCTIONAL

## 2025-02-03 DEVICE — SCREW LOKG 5X50MM: Type: IMPLANTABLE DEVICE | Site: RIGHT | Status: FUNCTIONAL

## 2025-02-03 DEVICE — PIN ORTHO PRECISION TAPER 3.9X450MM: Type: IMPLANTABLE DEVICE | Site: RIGHT | Status: FUNCTIONAL

## 2025-02-03 DEVICE — K-WIRE STRYKER 3.2M X 450MM: Type: IMPLANTABLE DEVICE | Site: RIGHT | Status: FUNCTIONAL

## 2025-02-03 RX ORDER — TRAMADOL HYDROCHLORIDE 100 MG/1
50 TABLET, EXTENDED RELEASE ORAL EVERY 4 HOURS
Refills: 0 | Status: DISCONTINUED | OUTPATIENT
Start: 2025-02-03 | End: 2025-02-05

## 2025-02-03 RX ORDER — MAGNESIUM SULFATE 0.8 MEQ/ML
1 AMPUL (ML) INJECTION ONCE
Refills: 0 | Status: DISCONTINUED | OUTPATIENT
Start: 2025-02-03 | End: 2025-02-03

## 2025-02-03 RX ORDER — ACETAMINOPHEN 160 MG/5ML
975 SUSPENSION ORAL EVERY 8 HOURS
Refills: 0 | Status: DISCONTINUED | OUTPATIENT
Start: 2025-02-03 | End: 2025-02-05

## 2025-02-03 RX ORDER — POTASSIUM CHLORIDE 750 MG/1
10 TABLET, EXTENDED RELEASE ORAL
Refills: 0 | Status: DISCONTINUED | OUTPATIENT
Start: 2025-02-03 | End: 2025-02-05

## 2025-02-03 RX ORDER — PANTOPRAZOLE 20 MG/1
40 TABLET, DELAYED RELEASE ORAL
Refills: 0 | Status: DISCONTINUED | OUTPATIENT
Start: 2025-02-03 | End: 2025-02-05

## 2025-02-03 RX ORDER — SOD PHOSPHATE,MONOBASIC-DIBAS 3MMOL/ML
15 VIAL (ML) INTRAVENOUS ONCE
Refills: 0 | Status: DISCONTINUED | OUTPATIENT
Start: 2025-02-03 | End: 2025-02-03

## 2025-02-03 RX ORDER — ACETAMINOPHEN 160 MG/5ML
1000 SUSPENSION ORAL ONCE
Refills: 0 | Status: COMPLETED | OUTPATIENT
Start: 2025-02-03 | End: 2025-02-03

## 2025-02-03 RX ORDER — ONDANSETRON 4 MG/1
4 TABLET, ORALLY DISINTEGRATING ORAL EVERY 6 HOURS
Refills: 0 | Status: DISCONTINUED | OUTPATIENT
Start: 2025-02-03 | End: 2025-02-05

## 2025-02-03 RX ORDER — HYDROMORPHONE HYDROCHLORIDE 4 MG/ML
0.5 INJECTION, SOLUTION INTRAMUSCULAR; INTRAVENOUS; SUBCUTANEOUS
Refills: 0 | Status: DISCONTINUED | OUTPATIENT
Start: 2025-02-03 | End: 2025-02-04

## 2025-02-03 RX ORDER — OXYCODONE HYDROCHLORIDE 30 MG/1
10 TABLET ORAL EVERY 4 HOURS
Refills: 0 | Status: DISCONTINUED | OUTPATIENT
Start: 2025-02-03 | End: 2025-02-05

## 2025-02-03 RX ORDER — SODIUM CHLORIDE 9 G/ML
1000 INJECTION, SOLUTION INTRAVENOUS
Refills: 0 | Status: DISCONTINUED | OUTPATIENT
Start: 2025-02-03 | End: 2025-02-04

## 2025-02-03 RX ORDER — BACTERIOSTATIC SODIUM CHLORIDE 0.9 %
1000 VIAL (ML) INJECTION
Refills: 0 | Status: DISCONTINUED | OUTPATIENT
Start: 2025-02-03 | End: 2025-02-03

## 2025-02-03 RX ORDER — BACTERIOSTATIC SODIUM CHLORIDE 0.9 %
1000 VIAL (ML) INJECTION
Refills: 0 | Status: DISCONTINUED | OUTPATIENT
Start: 2025-02-03 | End: 2025-02-04

## 2025-02-03 RX ORDER — OXYCODONE HYDROCHLORIDE 30 MG/1
5 TABLET ORAL EVERY 4 HOURS
Refills: 0 | Status: DISCONTINUED | OUTPATIENT
Start: 2025-02-03 | End: 2025-02-05

## 2025-02-03 RX ORDER — CEFAZOLIN SODIUM IN 0.9 % NACL 2 G/10 ML
2000 SYRINGE (ML) INTRAVENOUS EVERY 8 HOURS
Refills: 0 | Status: COMPLETED | OUTPATIENT
Start: 2025-02-03 | End: 2025-02-04

## 2025-02-03 RX ORDER — MAGNESIUM HYDROXIDE 400 MG/5ML
30 SUSPENSION, ORAL (FINAL DOSE FORM) ORAL DAILY
Refills: 0 | Status: DISCONTINUED | OUTPATIENT
Start: 2025-02-03 | End: 2025-02-05

## 2025-02-03 RX ORDER — SENNOSIDES 8.6 MG
2 TABLET ORAL AT BEDTIME
Refills: 0 | Status: DISCONTINUED | OUTPATIENT
Start: 2025-02-03 | End: 2025-02-05

## 2025-02-03 RX ORDER — POLYETHYLENE GLYCOL 3350 17 G/17G
17 POWDER, FOR SOLUTION ORAL AT BEDTIME
Refills: 0 | Status: DISCONTINUED | OUTPATIENT
Start: 2025-02-03 | End: 2025-02-05

## 2025-02-03 RX ORDER — ONDANSETRON 4 MG/1
4 TABLET, ORALLY DISINTEGRATING ORAL ONCE
Refills: 0 | Status: DISCONTINUED | OUTPATIENT
Start: 2025-02-03 | End: 2025-02-04

## 2025-02-03 RX ADMIN — POTASSIUM CHLORIDE 100 MILLIEQUIVALENT(S): 750 TABLET, EXTENDED RELEASE ORAL at 12:56

## 2025-02-03 RX ADMIN — HYDROMORPHONE HYDROCHLORIDE 0.5 MILLIGRAM(S): 4 INJECTION, SOLUTION INTRAMUSCULAR; INTRAVENOUS; SUBCUTANEOUS at 11:52

## 2025-02-03 RX ADMIN — TRAMADOL HYDROCHLORIDE 50 MILLIGRAM(S): 100 TABLET, EXTENDED RELEASE ORAL at 08:26

## 2025-02-03 RX ADMIN — ACETAMINOPHEN 400 MILLIGRAM(S): 160 SUSPENSION ORAL at 21:11

## 2025-02-03 RX ADMIN — HYDROMORPHONE HYDROCHLORIDE 0.5 MILLIGRAM(S): 4 INJECTION, SOLUTION INTRAMUSCULAR; INTRAVENOUS; SUBCUTANEOUS at 02:28

## 2025-02-03 RX ADMIN — ACETAMINOPHEN 1000 MILLIGRAM(S): 160 SUSPENSION ORAL at 22:19

## 2025-02-03 RX ADMIN — Medication 10 MILLIGRAM(S): at 05:32

## 2025-02-03 RX ADMIN — POTASSIUM CHLORIDE 100 MILLIEQUIVALENT(S): 750 TABLET, EXTENDED RELEASE ORAL at 14:01

## 2025-02-03 RX ADMIN — SODIUM CHLORIDE 75 MILLILITER(S): 9 INJECTION, SOLUTION INTRAVENOUS at 18:30

## 2025-02-03 RX ADMIN — ESCITALOPRAM 20 MILLIGRAM(S): 10 TABLET, FILM COATED ORAL at 12:56

## 2025-02-03 RX ADMIN — Medication 100 MILLIGRAM(S): at 22:00

## 2025-02-03 RX ADMIN — HYDROMORPHONE HYDROCHLORIDE 0.5 MILLIGRAM(S): 4 INJECTION, SOLUTION INTRAMUSCULAR; INTRAVENOUS; SUBCUTANEOUS at 03:28

## 2025-02-03 NOTE — DISCHARGE NOTE PROVIDER - HOSPITAL COURSE
80yo F PMHx HTN, HLD who presents with fall, right hip pain and prolonged down time s/p fall. Admitted for operative management of R hip fracture.       Problem/Plan - 1:  ·  Problem: Intertrochanteric fracture of right hip.   ·  Plan: Pt presenting to ED s/p fall on R hip Thursday, likely mechanical fall, was on the floor for 4 days   - XR Hip w/ Pelvis, Femur: Acute severely comminuted and moderately displaced intertrochanteric fracture of the right femur. No dislocation.  - CT C/A/P: Markedly comminuted and angulated right intertrochanteric femoral fracture. No additional acute traumatic injury identified within the chest, abdomen, or pelvis.  - b/l LE doppler negative for DVT  - s/p R hip ORIF 2/3/25   - Pain control: Tylenol 650mg q6h for mild pain, tramadol 50mg q6h for mod pain, oxycodone   - DVT prophylaxis   - symptomatic anemia: will transfuse one unit PRBC today   - low urine out put , IVF increased.     Problem/Plan - 2:  ·  Problem: Anemia due to acute blood loss.   ·  Plan: due to Femur fracture and Sx.   symptomatic, will transfuse.     Problem/Plan - 3:  ·  Problem: Rhabdomyolysis.   ·  Plan: Pt s/p fall on Thursday w/ R hip injury found ~3 days later by son  - CK: 1423  - S/p 1L NS bolus x1 in ED  - C/w IVF --> NS @ 50mL/hr  x20 hours  - Monitor routine CMP  - F/u b/l LE doppler to r/o DVT  - Nephrology (Dr. Hurtado) consult noted.     Problem/Plan - 4:  ·  Problem: Aortic stenosis.   ·  Plan: Pt w/ extensive aortic valve leaflet calcifications visualized on CT imaging  - Patient reports known history of aortic stenosis murmur; follows with Dr. Villalobos (last seen 9/16/24)  - Patient denies any recent syncope, angina, dyspnea  - ECHO 2/3/25     1. Technically difficult image quality.   2. Left ventricular systolic function is normal with an ejection fraction of 58 % by Rodriguez's method of disks.   3. Left atrium is mildly dilated.   4. The peak transaortic velocity is 5.57 m/s, peak transaortic gradient is 124.1 mmHg and mean transaortic gradient is 73.0 mmHg with an LVOT/aortic valve VTI ratio of 0.28. The effective orifice area is estimated at 0.81 cm² by the continuity equation.   5. There is severe calcification of the aortic valve leaflets.   6. There is mild (grade 1) left ventricular diastolic dysfunction.   7. Normal right ventricular systolic function.   8. Thickened pericardium.    - Cardiology eval noted.     Problem/Plan - 5:  ·  Problem: HTN (hypertension).   ·  Plan: Chronic issue  - BP on admission: 180/84  - C/w home amlodipine 10mg qd w/ hold parameters  - Continue to monitor routine hemodynamics.     Problem/Plan - 6:  ·  Problem: HLD (hyperlipidemia).   ·  Plan: Chronic issue  - C/w home Crestor 5mg qHS.     Problem/Plan - 7:  ·  Problem: GERD (gastroesophageal reflux disease).   ·  Plan: Chronic issue  - C/w Pepcid 40mg qd.     Problem/Plan - 8:  ·  Problem: Major depression.   ·  Plan: Chronic issue  - Per chart review on [employer] and [employer], currently on Lexapro 20mg qd  - C/w home medication.     Problem/Plan - 9:  ·  Problem: Medication management.   ·  Plan: Patient unable to recall exact dosing of home meds  - Discussed with patient medications and dosing based on chart from 2023; cross referenced with [employer]  - Lexapro and Pepcid dosing via [employer], will continue for now  - Patient previously on HCTZ 25mg qd, pt does not endorse taking it but unsure - per cardio via [employer] active med.     HPI:  78yo F PMHx HTN, HLD who presents with fall, right hip pain and prolonged down time s/p fall. Patient states that Thursday morning, she got out of bed while changing, slipped and fell immediately injuring her right hip and had been laying on the ground since then. She was unable to get up on her own, when today her son came to visit and found her. She reports she was laying on her back when she was found. Rates her R hip pain at 5/10 currently. Denies any AC use, denies LOC or head trauma at time of fall, primary complaint is right hip pain at this time. Denies any syncopal episodes recently, anginal symptoms or shortness of breath at rest or with exertion.    In the ED:  T: 97F, HR: 73, BP: 180/84, RR: 22, SpO2: 92% on RA  Labs notable for WBC: 15.28, K: 3.2, BUN/Cr: 29/0.72, glucose: 137, total bili: 2.6, AST/ALT: 65/53, CK: 1423  UA: protein: 30, trace ketones, occasional bacteria, SEC present, glucose: 250, negative for LE + nitrite  EKG: NSR, LVH with repolarization abnormality; cannot rule out Septal infarct, age undetermined - QTc: 473ms  CXR: The heart is unremarkable. The lungs are clear. No acute osseous abnormality.  XR Hip w/ Pelvis, Femur: Acute severely comminuted and moderately displaced intertrochanteric fracture of the right femur. No dislocation.  CT Head: Age-appropriate involutional and ischemic gliotic changes. No change since 11/28/2022. No hemorrhage  CT C-spine: Degenerative changes. No acute fractures or dislocations  CT C/A/P: 1. Markedly comminuted and angulated right intertrochanteric femoral fracture.  2. No additional acute traumatic injury identified within the chest, abdomen, or pelvis.  3. Extensive aortic valve leaflet calcifications are present. Correlation for aortic stenosis is suggested.  Received in the ED: 1L NS bolus x1, IV KCl 10mEq x3 (02 Feb 2025 16:12)      ---  HOSPITAL COURSE: Patient admitted to medicine floor for management of hip fracture. XR Hip w/ Pelvis, Femur: Acute severely comminuted and moderately displaced intertrochanteric fracture of the right femur. No dislocation.  - CT C/A/P: Markedly comminuted and angulated right intertrochanteric femoral fracture. No additional acute traumatic injury identified within the chest, abdomen, or pelvis.  b/l LE doppler negative for DVT. She is s/p R hip ORIF 2/3/25   - Pain control: Tylenol 650mg q6h for mild pain, tramadol 50mg q6h for mod pain, oxycodone   - DVT prophylaxis with lovenox  - symptomatic anemia: s/p one unit PRBC   Patient also found to be in rhabomyolysis. Nephro followed . Renal indices stable and CPK levels trended down. All other chronic issues addressed.        Pt seen and examined on day of discharge. Patient is medically optimized for discharge to rehab with close outpatient followup.          ---  CONSULTANTS:   Orthopedic physician, cardiologist, nephrologist and PCP    ---  TIME SPENT:  I, the attending physician, was physically present for the key portions of the evaluation and management (E/M) service provided. The total amount of time spent reviewing the hospital notes, laboratory values, imaging findings, assessing/counseling the patient, discussing with consultant physicians, social work, nursing staff was --40 minutes

## 2025-02-03 NOTE — CONSULT NOTE ADULT - SUBJECTIVE AND OBJECTIVE BOX
[facility]         Sonya Burgos, Yusuf, Meaghan, Pete      [phone] (office)    Reason for Consult: Pre-op clearance for orthopedic procedure    Interval HPI: Pt was seen and examined at bedside. Pt endorses not having episodes of syncope over the past several years. Denies any SOB w/ exertion. Denies any hx of ischemic heart disease, CHF, arrhythmia, and CVA. Pt does have high-risk active cardiac issue of severe aortic stenosis. Pt has no active ischemia, decompensated heart failure, unstable arrythmia, or severe stenotic valvular disease.    HPI:  78yo F PMHx of severe aortic stenosis, vagally-mediated presyncope/syncope, ventricular ectopy, moderate carotid atherosclerosis, HTN, HLD who presents with fall, right hip pain and prolonged down time s/p fall. Patient states that Thursday morning, she got out of bed while changing, slipped and fell immediately injuring her right hip and had been laying on the ground since then. She was unable to get up on her own, when today her son came to visit and found her. She reports she was laying on her back when she was found. Rates her R hip pain at 5/10 currently. Denies any AC use, denies LOC or head trauma at time of fall, primary complaint is right hip pain at this time. Denies any syncopal episodes recently, anginal symptoms or shortness of breath at rest or with exertion.    In the ED:  T: 97F, HR: 73, BP: 180/84, RR: 22, SpO2: 92% on RA  Labs notable for WBC: 15.28, K: 3.2, BUN/Cr: 29/0.72, glucose: 137, total bili: 2.6, AST/ALT: 65/53, CK: 1423  UA: protein: 30, trace ketones, occasional bacteria, SEC present, glucose: 250, negative for LE + nitrite  EKG: NSR, LVH with repolarization abnormality; cannot rule out Septal infarct, age undetermined - QTc: 473ms  CXR: The heart is unremarkable. The lungs are clear. No acute osseous abnormality.  XR Hip w/ Pelvis, Femur: Acute severely comminuted and moderately displaced intertrochanteric fracture of the right femur. No dislocation.  CT Head: Age-appropriate involutional and ischemic gliotic changes. No change since 11/28/2022. No hemorrhage  CT C-spine: Degenerative changes. No acute fractures or dislocations  CT C/A/P: 1. Markedly comminuted and angulated right intertrochanteric femoral fracture.  2. No additional acute traumatic injury identified within the chest, abdomen, or pelvis.  3. Extensive aortic valve leaflet calcifications are present. Correlation for aortic stenosis is suggested.  Received in the ED: 1L NS bolus x1, IV KCl 10mEq x3 (02 Feb 2025 16:12)      PAST MEDICAL & SURGICAL HISTORY:  AS (aortic stenosis)  moderate ( ECHO 2013)  "asymptomatic"      HTN (hypertension)      HLD (hyperlipidemia)      Dry eyes      Colon cancer  adenocarcinoma      Acid reflux      OP (osteoporosis)      AV block      Syncope  last 2012      Carotid artery disease  on follow up      H/O tubal ligation      S/P colon polypectomy          SOCIAL HISTORY: No active tobacco, alcohol or illicit drug use    FAMILY HISTORY:      Home Medications:  amLODIPine 10 mg oral tablet: 1 tab(s) orally (02 Feb 2025 17:13)  Calcium 600+D oral tablet: 1 tab(s) orally 2 times a day (02 Feb 2025 17:13)  Crestor 5 mg oral tablet: 1 tab(s) orally once a day (at bedtime) (02 Feb 2025 17:32)  Lexapro 20 mg oral tablet: 1 tab(s) orally once a day (02 Feb 2025 17:26)  Pepcid 40 mg oral tablet: 1 tab(s) orally once a day (02 Feb 2025 17:26)      MEDICATIONS  (STANDING):  amLODIPine   Tablet 10 milliGRAM(s) Oral daily  calcium carbonate 1250 mG  + Vitamin D (OsCal 500 + D) 1 Tablet(s) Oral daily  ceFAZolin   IVPB 2000 milliGRAM(s) IV Intermittent once  escitalopram 20 milliGRAM(s) Oral daily  potassium chloride  10 mEq/100 mL IVPB 10 milliEquivalent(s) IV Intermittent every 1 hour  rosuvastatin 5 milliGRAM(s) Oral at bedtime  sodium chloride 0.9%. 1000 milliLiter(s) (50 mL/Hr) IV Continuous <Continuous>    MEDICATIONS  (PRN):  acetaminophen     Tablet .. 650 milliGRAM(s) Oral every 6 hours PRN Temp greater or equal to 38C (100.4F), Mild Pain (1 - 3)  aluminum hydroxide/magnesium hydroxide/simethicone Suspension 30 milliLiter(s) Oral every 4 hours PRN Dyspepsia  HYDROmorphone  Injectable 0.5 milliGRAM(s) IV Push every 6 hours PRN Severe Pain (7 - 10)  melatonin 3 milliGRAM(s) Oral at bedtime PRN Insomnia  traMADol 50 milliGRAM(s) Oral every 6 hours PRN Moderate Pain (4 - 6)      Allergies    Demerol HCl (Hives)  Demerol HCl (Vomiting)    Intolerances        REVIEW OF SYSTEMS: Negative except as per HPI.    VITAL SIGNS:   Vital Signs Last 24 Hrs  T(C): 36.9 (03 Feb 2025 04:46), Max: 37.1 (02 Feb 2025 18:21)  T(F): 98.4 (03 Feb 2025 04:46), Max: 98.7 (02 Feb 2025 18:21)  HR: 69 (03 Feb 2025 04:46) (69 - 81)  BP: 112/65 (03 Feb 2025 04:46) (101/66 - 180/84)  BP(mean): --  RR: 20 (03 Feb 2025 04:46) (18 - 22)  SpO2: 95% (03 Feb 2025 04:46) (92% - 100%)    Parameters below as of 03 Feb 2025 04:46  Patient On (Oxygen Delivery Method): room air        I&O's Summary    02 Feb 2025 07:01  -  03 Feb 2025 07:00  --------------------------------------------------------  IN: 500 mL / OUT: 400 mL / NET: 100 mL        PHYSICAL EXAM:  Constitutional: NAD, well-developed  Pulmonary: Non-labored, breath sounds are clear bilaterally, no wheezing, rales or rhonchi  Cardiovascular: +S1, S2, RRR, (+) systolic murmur appreciated  Gastrointestinal: Soft, nontender, nondistended, normoactive bowel sounds  Extremities: No peripheral edema   Neurological: Alert, strength and sensitivity are grossly intact      LABS: All Labs Reviewed:                        11.5   12.60 )-----------( 157      ( 03 Feb 2025 07:37 )             33.6                         11.3   13.82 )-----------( 165      ( 03 Feb 2025 05:15 )             33.3                         13.3   15.28 )-----------( 185      ( 02 Feb 2025 13:29 )             38.3     03 Feb 2025 07:37    138    |  102    |  30     ----------------------------<  111    3.4     |  32     |  0.57   03 Feb 2025 05:15    137    |  103    |  29     ----------------------------<  116    3.6     |  31     |  0.56   02 Feb 2025 13:29    139    |  99     |  29     ----------------------------<  137    3.2     |  29     |  0.72     Ca    8.6        03 Feb 2025 07:37  Ca    8.7        03 Feb 2025 05:15  Ca    9.3        02 Feb 2025 13:29  Phos  2.4       03 Feb 2025 07:37  Mg     2.7       03 Feb 2025 07:37  Mg     2.7       02 Feb 2025 13:29    TPro  5.6    /  Alb  2.7    /  TBili  2.5    /  DBili  x      /  AST  57     /  ALT  43     /  AlkPhos  72     03 Feb 2025 07:37  TPro  7.2    /  Alb  3.1    /  TBili  2.6    /  DBili  x      /  AST  65     /  ALT  53     /  AlkPhos  91     02 Feb 2025 13:29    PT/INR - ( 03 Feb 2025 05:15 )   PT: 11.9 sec;   INR: 1.01 ratio         PTT - ( 03 Feb 2025 05:15 )  PTT:27.4 sec      Blood Culture:         EKG: Vent Rate: 69 BPM. WV interval: 190 ms. QRS: 90 ms. QT/QTc: 442/473. Impression: Normal sinus rhythm. Left ventricular hypertrophy    :   [facility]         Sonya Burgos, Yusuf, Meaghan, Pete      [phone] (office)    Reason for Consult: Pre-op clearance for orthopedic procedure    Interval HPI: Pt was seen and examined at bedside. Pt endorses not having episodes of syncope over the past several years. Denies any SOB w/ exertion. Denies any hx of ischemic heart disease, CHF, arrhythmia, and CVA. Pt does have high-risk active cardiac issue of severe aortic stenosis. Pt has no active ischemia, decompensated heart failure, unstable arrythmia, or severe stenotic valvular disease.    HPI:  80yo F PMHx of severe aortic stenosis, vagally-mediated presyncope/syncope, ventricular ectopy, moderate carotid atherosclerosis, HTN, HLD who presents with fall, right hip pain and prolonged down time s/p fall. Patient states that Thursday morning, she got out of bed while changing, slipped and fell immediately injuring her right hip and had been laying on the ground since then. She was unable to get up on her own, when today her son came to visit and found her. She reports she was laying on her back when she was found. Rates her R hip pain at 5/10 currently. Denies any AC use, denies LOC or head trauma at time of fall, primary complaint is right hip pain at this time. Denies any syncopal episodes recently, anginal symptoms or shortness of breath at rest or with exertion.    In the ED:  T: 97F, HR: 73, BP: 180/84, RR: 22, SpO2: 92% on RA  Labs notable for WBC: 15.28, K: 3.2, BUN/Cr: 29/0.72, glucose: 137, total bili: 2.6, AST/ALT: 65/53, CK: 1423  UA: protein: 30, trace ketones, occasional bacteria, SEC present, glucose: 250, negative for LE + nitrite  EKG: NSR, LVH with repolarization abnormality; cannot rule out Septal infarct, age undetermined - QTc: 473ms  CXR: The heart is unremarkable. The lungs are clear. No acute osseous abnormality.  XR Hip w/ Pelvis, Femur: Acute severely comminuted and moderately displaced intertrochanteric fracture of the right femur. No dislocation.  CT Head: Age-appropriate involutional and ischemic gliotic changes. No change since 11/28/2022. No hemorrhage  CT C-spine: Degenerative changes. No acute fractures or dislocations  CT C/A/P: 1. Markedly comminuted and angulated right intertrochanteric femoral fracture.  2. No additional acute traumatic injury identified within the chest, abdomen, or pelvis.  3. Extensive aortic valve leaflet calcifications are present. Correlation for aortic stenosis is suggested.  Received in the ED: 1L NS bolus x1, IV KCl 10mEq x3 (02 Feb 2025 16:12)      PAST MEDICAL & SURGICAL HISTORY:  AS (aortic stenosis)  moderate ( ECHO 2013)  "asymptomatic"      HTN (hypertension)      HLD (hyperlipidemia)      Dry eyes      Colon cancer  adenocarcinoma      Acid reflux      OP (osteoporosis)      AV block      Syncope  last 2012      Carotid artery disease  on follow up      H/O tubal ligation      S/P colon polypectomy          SOCIAL HISTORY: No active tobacco, alcohol or illicit drug use    FAMILY HISTORY:      Home Medications:  amLODIPine 10 mg oral tablet: 1 tab(s) orally (02 Feb 2025 17:13)  Calcium 600+D oral tablet: 1 tab(s) orally 2 times a day (02 Feb 2025 17:13)  Crestor 5 mg oral tablet: 1 tab(s) orally once a day (at bedtime) (02 Feb 2025 17:32)  Lexapro 20 mg oral tablet: 1 tab(s) orally once a day (02 Feb 2025 17:26)  Pepcid 40 mg oral tablet: 1 tab(s) orally once a day (02 Feb 2025 17:26)      MEDICATIONS  (STANDING):  amLODIPine   Tablet 10 milliGRAM(s) Oral daily  calcium carbonate 1250 mG  + Vitamin D (OsCal 500 + D) 1 Tablet(s) Oral daily  ceFAZolin   IVPB 2000 milliGRAM(s) IV Intermittent once  escitalopram 20 milliGRAM(s) Oral daily  potassium chloride  10 mEq/100 mL IVPB 10 milliEquivalent(s) IV Intermittent every 1 hour  rosuvastatin 5 milliGRAM(s) Oral at bedtime  sodium chloride 0.9%. 1000 milliLiter(s) (50 mL/Hr) IV Continuous <Continuous>    MEDICATIONS  (PRN):  acetaminophen     Tablet .. 650 milliGRAM(s) Oral every 6 hours PRN Temp greater or equal to 38C (100.4F), Mild Pain (1 - 3)  aluminum hydroxide/magnesium hydroxide/simethicone Suspension 30 milliLiter(s) Oral every 4 hours PRN Dyspepsia  HYDROmorphone  Injectable 0.5 milliGRAM(s) IV Push every 6 hours PRN Severe Pain (7 - 10)  melatonin 3 milliGRAM(s) Oral at bedtime PRN Insomnia  traMADol 50 milliGRAM(s) Oral every 6 hours PRN Moderate Pain (4 - 6)      Allergies    Demerol HCl (Hives)  Demerol HCl (Vomiting)    Intolerances        REVIEW OF SYSTEMS: Negative except as per HPI.    VITAL SIGNS:   Vital Signs Last 24 Hrs  T(C): 36.9 (03 Feb 2025 04:46), Max: 37.1 (02 Feb 2025 18:21)  T(F): 98.4 (03 Feb 2025 04:46), Max: 98.7 (02 Feb 2025 18:21)  HR: 69 (03 Feb 2025 04:46) (69 - 81)  BP: 112/65 (03 Feb 2025 04:46) (101/66 - 180/84)  BP(mean): --  RR: 20 (03 Feb 2025 04:46) (18 - 22)  SpO2: 95% (03 Feb 2025 04:46) (92% - 100%)    Parameters below as of 03 Feb 2025 04:46  Patient On (Oxygen Delivery Method): room air        I&O's Summary    02 Feb 2025 07:01  -  03 Feb 2025 07:00  --------------------------------------------------------  IN: 500 mL / OUT: 400 mL / NET: 100 mL        PHYSICAL EXAM:  Constitutional: NAD, well-developed  Pulmonary: Non-labored, breath sounds are clear bilaterally, no wheezing, rales or rhonchi  Cardiovascular: +S1, S2, RRR, (+) systolic murmur appreciated  Gastrointestinal: Soft, nontender, nondistended, normoactive bowel sounds  Extremities: No peripheral edema   Neurological: Alert, strength and sensitivity are grossly intact      LABS: All Labs Reviewed:                        11.5   12.60 )-----------( 157      ( 03 Feb 2025 07:37 )             33.6                         11.3   13.82 )-----------( 165      ( 03 Feb 2025 05:15 )             33.3                         13.3   15.28 )-----------( 185      ( 02 Feb 2025 13:29 )             38.3     03 Feb 2025 07:37    138    |  102    |  30     ----------------------------<  111    3.4     |  32     |  0.57   03 Feb 2025 05:15    137    |  103    |  29     ----------------------------<  116    3.6     |  31     |  0.56   02 Feb 2025 13:29    139    |  99     |  29     ----------------------------<  137    3.2     |  29     |  0.72     Ca    8.6        03 Feb 2025 07:37  Ca    8.7        03 Feb 2025 05:15  Ca    9.3        02 Feb 2025 13:29  Phos  2.4       03 Feb 2025 07:37  Mg     2.7       03 Feb 2025 07:37  Mg     2.7       02 Feb 2025 13:29    TPro  5.6    /  Alb  2.7    /  TBili  2.5    /  DBili  x      /  AST  57     /  ALT  43     /  AlkPhos  72     03 Feb 2025 07:37  TPro  7.2    /  Alb  3.1    /  TBili  2.6    /  DBili  x      /  AST  65     /  ALT  53     /  AlkPhos  91     02 Feb 2025 13:29    PT/INR - ( 03 Feb 2025 05:15 )   PT: 11.9 sec;   INR: 1.01 ratio         PTT - ( 03 Feb 2025 05:15 )  PTT:27.4 sec      Blood Culture:         EKG: Vent Rate: 69 BPM. OR interval: 190 ms. QRS: 90 ms. QT/QTc: 442/473. Impression: Normal sinus rhythm. Left ventricular hypertrophy   [facility]         Sonya Burgos, Yusuf, Meaghan, Pete      [phone] (office)    Reason for Consult: Pre-op clearance for orthopedic procedure    Interval HPI: Pt was seen and examined at bedside. Pt endorses not having episodes of syncope over the past several years. Denies any SOB w/ exertion. Denies any hx of ischemic heart disease, CHF, arrhythmia, and CVA. Pt does have high-risk active cardiac issue of severe aortic stenosis. Pt has no active ischemia, decompensated heart failure, unstable arrythmia, or severe stenotic valvular disease.    HPI:  78yo F PMHx of severe aortic stenosis, vagally-mediated presyncope/syncope, ventricular ectopy, moderate carotid atherosclerosis, HTN, HLD who presents with fall, right hip pain and prolonged down time s/p fall. Patient states that Thursday morning, she got out of bed while changing, slipped and fell immediately injuring her right hip and had been laying on the ground since then. She was unable to get up on her own, when today her son came to visit and found her. She reports she was laying on her back when she was found. Rates her R hip pain at 5/10 currently. Denies any AC use, denies LOC or head trauma at time of fall, primary complaint is right hip pain at this time. Denies any syncopal episodes recently, anginal symptoms or shortness of breath at rest or with exertion.    In the ED:  T: 97F, HR: 73, BP: 180/84, RR: 22, SpO2: 92% on RA  Labs notable for WBC: 15.28, K: 3.2, BUN/Cr: 29/0.72, glucose: 137, total bili: 2.6, AST/ALT: 65/53, CK: 1423  UA: protein: 30, trace ketones, occasional bacteria, SEC present, glucose: 250, negative for LE + nitrite  EKG: NSR, LVH with repolarization abnormality; cannot rule out Septal infarct, age undetermined - QTc: 473ms  CXR: The heart is unremarkable. The lungs are clear. No acute osseous abnormality.  XR Hip w/ Pelvis, Femur: Acute severely comminuted and moderately displaced intertrochanteric fracture of the right femur. No dislocation.  CT Head: Age-appropriate involutional and ischemic gliotic changes. No change since 11/28/2022. No hemorrhage  CT C-spine: Degenerative changes. No acute fractures or dislocations  CT C/A/P: 1. Markedly comminuted and angulated right intertrochanteric femoral fracture.  2. No additional acute traumatic injury identified within the chest, abdomen, or pelvis.  3. Extensive aortic valve leaflet calcifications are present. Correlation for aortic stenosis is suggested.  Received in the ED: 1L NS bolus x1, IV KCl 10mEq x3 (02 Feb 2025 16:12)      PAST MEDICAL & SURGICAL HISTORY:  AS (aortic stenosis)  moderate ( ECHO 2013)  "asymptomatic"      HTN (hypertension)      HLD (hyperlipidemia)      Dry eyes      Colon cancer  adenocarcinoma      Acid reflux      OP (osteoporosis)      AV block      Syncope  last 2012      Carotid artery disease  on follow up      H/O tubal ligation      S/P colon polypectomy          SOCIAL HISTORY: No active tobacco, alcohol or illicit drug use    FAMILY HISTORY: no scd or early cad       Home Medications:  amLODIPine 10 mg oral tablet: 1 tab(s) orally (02 Feb 2025 17:13)  Calcium 600+D oral tablet: 1 tab(s) orally 2 times a day (02 Feb 2025 17:13)  Crestor 5 mg oral tablet: 1 tab(s) orally once a day (at bedtime) (02 Feb 2025 17:32)  Lexapro 20 mg oral tablet: 1 tab(s) orally once a day (02 Feb 2025 17:26)  Pepcid 40 mg oral tablet: 1 tab(s) orally once a day (02 Feb 2025 17:26)      MEDICATIONS  (STANDING):  amLODIPine   Tablet 10 milliGRAM(s) Oral daily  calcium carbonate 1250 mG  + Vitamin D (OsCal 500 + D) 1 Tablet(s) Oral daily  ceFAZolin   IVPB 2000 milliGRAM(s) IV Intermittent once  escitalopram 20 milliGRAM(s) Oral daily  potassium chloride  10 mEq/100 mL IVPB 10 milliEquivalent(s) IV Intermittent every 1 hour  rosuvastatin 5 milliGRAM(s) Oral at bedtime  sodium chloride 0.9%. 1000 milliLiter(s) (50 mL/Hr) IV Continuous <Continuous>    MEDICATIONS  (PRN):  acetaminophen     Tablet .. 650 milliGRAM(s) Oral every 6 hours PRN Temp greater or equal to 38C (100.4F), Mild Pain (1 - 3)  aluminum hydroxide/magnesium hydroxide/simethicone Suspension 30 milliLiter(s) Oral every 4 hours PRN Dyspepsia  HYDROmorphone  Injectable 0.5 milliGRAM(s) IV Push every 6 hours PRN Severe Pain (7 - 10)  melatonin 3 milliGRAM(s) Oral at bedtime PRN Insomnia  traMADol 50 milliGRAM(s) Oral every 6 hours PRN Moderate Pain (4 - 6)      Allergies    Demerol HCl (Hives)  Demerol HCl (Vomiting)    Intolerances        REVIEW OF SYSTEMS: Negative except as per HPI.    VITAL SIGNS:   Vital Signs Last 24 Hrs  T(C): 36.9 (03 Feb 2025 04:46), Max: 37.1 (02 Feb 2025 18:21)  T(F): 98.4 (03 Feb 2025 04:46), Max: 98.7 (02 Feb 2025 18:21)  HR: 69 (03 Feb 2025 04:46) (69 - 81)  BP: 112/65 (03 Feb 2025 04:46) (101/66 - 180/84)  BP(mean): --  RR: 20 (03 Feb 2025 04:46) (18 - 22)  SpO2: 95% (03 Feb 2025 04:46) (92% - 100%)    Parameters below as of 03 Feb 2025 04:46  Patient On (Oxygen Delivery Method): room air        I&O's Summary    02 Feb 2025 07:01  -  03 Feb 2025 07:00  --------------------------------------------------------  IN: 500 mL / OUT: 400 mL / NET: 100 mL        PHYSICAL EXAM:  Constitutional: NAD, well-developed  Pulmonary: Non-labored, breath sounds are clear bilaterally, no wheezing, rales or rhonchi  Cardiovascular: +S1, S2, RRR, (+) systolic murmur appreciated  Gastrointestinal: Soft, nontender, nondistended, normoactive bowel sounds  Extremities: No peripheral edema   Neurological: Alert, strength and sensitivity are grossly intact  psych appropriate mood and affect    LABS: All Labs Reviewed:                        11.5 12.60 )-----------( 157      ( 03 Feb 2025 07:37 )             33.6                         11.3   13.82 )-----------( 165      ( 03 Feb 2025 05:15 )             33.3                         13.3   15.28 )-----------( 185      ( 02 Feb 2025 13:29 )             38.3     03 Feb 2025 07:37    138    |  102    |  30     ----------------------------<  111    3.4     |  32     |  0.57   03 Feb 2025 05:15    137    |  103    |  29     ----------------------------<  116    3.6     |  31     |  0.56   02 Feb 2025 13:29    139    |  99     |  29     ----------------------------<  137    3.2     |  29     |  0.72     Ca    8.6        03 Feb 2025 07:37  Ca    8.7        03 Feb 2025 05:15  Ca    9.3        02 Feb 2025 13:29  Phos  2.4       03 Feb 2025 07:37  Mg     2.7       03 Feb 2025 07:37  Mg     2.7       02 Feb 2025 13:29    TPro  5.6    /  Alb  2.7    /  TBili  2.5    /  DBili  x      /  AST  57     /  ALT  43     /  AlkPhos  72     03 Feb 2025 07:37  TPro  7.2    /  Alb  3.1    /  TBili  2.6    /  DBili  x      /  AST  65     /  ALT  53     /  AlkPhos  91     02 Feb 2025 13:29    PT/INR - ( 03 Feb 2025 05:15 )   PT: 11.9 sec;   INR: 1.01 ratio         PTT - ( 03 Feb 2025 05:15 )  PTT:27.4 sec      Blood Culture:         EKG: Vent Rate: 69 BPM. VA interval: 190 ms. QRS: 90 ms. QT/QTc: 442/473. Impression: Normal sinus rhythm. Left ventricular hypertrophy

## 2025-02-03 NOTE — DISCHARGE NOTE PROVIDER - [id number]_GEN_ALL_CORE_FT
PRINCIPAL PROCEDURE  Procedure: Insertion of locking intramedullary eddie into right hip  Findings and Treatment:

## 2025-02-03 NOTE — CONSULT NOTE ADULT - ASSESSMENT
Assessment: 80yo F PMHx of severe aortic stenosis, vagally-mediated presyncope/syncope, ventricular ectopy, moderate carotid atherosclerosis, HTN, HLD. Case was consulted d/t clearance for orthopedic intervention for right intertrochanteric fracture.    Plan:   - Pt w/ no chest pain or SOB. No evidence of active ACS  - No evidence of ACS on EKG     - No meaningful evidence of volume overload.  - Previous TTE shows severe ACS, mild-moderate aortic regurgitation, LV systolic function estimated 60-65%.  - Strict I/Os, daily weights.    - BP elevated on admission, likely 2/2 pain. Now normotensive    - Monitor and replete lytes, keep K>4, Mg>2.    - Pt has high-risk active cardiac issue of severe stenotic valvular disease, and has two modifiable cardiac risk factors of HTN and HLD. In the setting of an intermediate risk orthopedic procedure, he/she is optimized from cardiovascular standpoint to proceed with planned procedure with routine hemodynamic monitoring. Pt has no active ischemia, decompensated heart failure, unstable arrythmia, or severe stenotic valvular disease.    - Other cardiovascular workup will depend on clinical course.  - All other workup per primary team.  - Will continue to follow.             Assessment: 78yo F PMHx of severe aortic stenosis, vagally-mediated presyncope/syncope, ventricular ectopy, moderate carotid atherosclerosis, HTN, HLD. Case was consulted d/t clearance for orthopedic intervention for right intertrochanteric fracture.    Plan:   - Pt w/ no chest pain or SOB. No evidence of active ACS  - No evidence of ACS on EKG     - No meaningful evidence of volume overload.  - Previous TTE shows severe AS, mild-moderate aortic regurgitation, LV systolic function estimated 60-65%.  - Strict I/Os, daily weights.    - BP elevated on admission, likely 2/2 pain.   - C/w home amlodipine    - Monitor and replete lytes, keep K>4, Mg>2.    - Pt has high-risk active cardiac issue of severe stenotic valvular disease, and has two modifiable cardiac risk factors of HTN and HLD. In the setting of an intermediate risk orthopedic procedure, he/she is optimized from cardiovascular standpoint to proceed with planned procedure with routine hemodynamic monitoring. Pt has no active ischemia, decompensated heart failure, unstable arrythmia, or severe stenotic valvular disease.    - Other cardiovascular workup will depend on clinical course.  - All other workup per primary team.  - Will continue to follow.             Assessment: 78yo F PMHx of severe aortic stenosis, vagally-mediated presyncope/syncope, ventricular ectopy, moderate carotid atherosclerosis, HTN, HLD. Case was consulted d/t clearance for orthopedic intervention for right intertrochanteric fracture.    Plan:   - Pt w/ no chest pain or SOB. No evidence of active ACS  - No evidence of ACS on EKG     - No meaningful evidence of volume overload.  - Previous TTE shows severe AS, mild-moderate aortic regurgitation, LV systolic function estimated 60-65%.  - Strict I/Os, daily weights.    - BP elevated on admission, likely 2/2 pain.   - C/w home amlodipine    - Monitor and replete lytes, keep K>4, Mg>2.    - Pt has high-risk active cardiac issue of severe stenotic valvular disease, and has two modifiable cardiac risk factors of HTN and HLD. In the setting of an intermediate risk orthopedic procedure, he/she is optimized from cardiovascular standpoint to proceed with planned procedure with routine hemodynamic monitoring. Pt has no active ischemia, decompensated heart failure, unstable arrythmia.    - Other cardiovascular workup will depend on clinical course.  - All other workup per primary team.  - Will continue to follow.             78yo F PMHx of severe aortic stenosis, vagally-mediated presyncope/syncope, ventricular ectopy, moderate carotid atherosclerosis, HTN, HLD. Case was consulted d/t clearance for orthopedic intervention for right intertrochanteric fracture.    Plan:   - Pt w/ no chest pain or SOB. No evidence of active ACS  - No evidence of ACS on EKG     - No meaningful evidence of volume overload.  - Previous TTE shows severe AS, mild-moderate aortic regurgitation, LV systolic function estimated 60-65%.  - Strict I/Os, daily weights.    - BP elevated on admission, likely 2/2 pain.   - C/w home amlodipine    - Monitor and replete lytes, keep K>4, Mg>2.    - Pt has high-risk with active cardiac issue of severe stenotic valvular disease, and has two modifiable cardiac risk factors of HTN and HLD.  Pt has no active ischemia, decompensated heart failure, unstable arrythmia. In the setting of an intermediate risk emergent  orthopedic procedure, he/she is optimized from cardiovascular standpoint to proceed with planned procedure with routine hemodynamic monitoring.    - Other cardiovascular workup will depend on clinical course.  - All other workup per primary team.  - Will continue to follow.

## 2025-02-03 NOTE — DISCHARGE NOTE PROVIDER - NSDCMRMEDTOKEN_GEN_ALL_CORE_FT
amLODIPine 10 mg oral tablet: 1 tab(s) orally  Calcium 600+D oral tablet: 1 tab(s) orally 2 times a day  Crestor 5 mg oral tablet: 1 tab(s) orally once a day (at bedtime)  Lexapro 20 mg oral tablet: 1 tab(s) orally once a day  Pepcid 40 mg oral tablet: 1 tab(s) orally once a day   acetaminophen 325 mg oral tablet: 3 tab(s) orally every 8 hours  amLODIPine 10 mg oral tablet: 1 tab(s) orally  ascorbic acid 500 mg oral tablet: 1 tab(s) orally 2 times a day  Calcium 600+D oral tablet: 1 tab(s) orally 2 times a day  Crestor 5 mg oral tablet: 1 tab(s) orally once a day (at bedtime)  enoxaparin: 40 milligram(s) subcutaneous once a day 40mg injection subcutaneous daily  Lexapro 20 mg oral tablet: 1 tab(s) orally once a day  magnesium hydroxide 8% oral suspension: 30 milliliter(s) orally once a day As needed Constipation  oxyCODONE 5 mg oral tablet: 1 tab(s) orally every 4 hours As needed Moderate Pain (4 - 6)  Pepcid 40 mg oral tablet: 1 tab(s) orally once a day  polyethylene glycol 3350 oral powder for reconstitution: 17 gram(s) orally once a day (at bedtime) As needed Constipation  senna leaf extract oral tablet: 2 tab(s) orally once a day (at bedtime) As needed Constipation  traMADol 50 mg oral tablet: 1 tab(s) orally every 4 hours As needed Mild Pain (1 - 3)

## 2025-02-03 NOTE — DISCHARGE NOTE PROVIDER - CARE PROVIDERS DIRECT ADDRESSES
[email or web address]

## 2025-02-03 NOTE — DISCHARGE NOTE PROVIDER - NSDCFUADDINST_GEN_ALL_CORE_FT
Discharge Instructions for Right Hip Intramedullary Nail:    1. ACTIVITY: WBAT. Rolling walker. Daily PT.  2. CALL FOR: fever over 101, wound redness, drainage or open area, calf pain/calf swelling.  3. BANDAGE: Change dressing to a new bandage POD7. May change sooner if dressing saturated or falling off. DO NOT REMOVE BANDAGE TO CHECK WOUND ON INTAKE.  4. STAPLES: RN Remove Staples POD14 at rehab.  5. SHOWER: Okay to shower. Do not scrub dressing or submerge under water. No baths or hot tubs.   6. DVT PE Prophylaxis: Follow instructions provided by your primary care team.  7.  FOLLOW UP: Dr. Hedrick in 6 weeks or after discharge from rehab. Call to schedule.  8. MEDICATION: eRX sent to your pharmacy for  if you go home.   9.**Call office if medications not covered under your insurance, especially BLOOD CLOT PREVENTION/anticoagulant medication.

## 2025-02-03 NOTE — DISCHARGE NOTE PROVIDER - NSDCFUSCHEDAPPT_GEN_ALL_CORE_FT
Riccardo Villalobos  [facility] Physician [facility]  CARDIOLOGY [address] [facility]  Scheduled Appointment: 03/17/2025    Colin Espinoza  [facility] Physician [facility]  NEUROLOGY [address] [place] [address]  Scheduled Appointment: 03/17/2025

## 2025-02-03 NOTE — CONSULT NOTE ADULT - ATTENDING COMMENTS
80yo F PMHx of severe aortic stenosis, vagally-mediated presyncope/syncope, ventricular ectopy, moderate carotid atherosclerosis, HTN, HLD. Case was consulted d/t clearance for orthopedic intervention for right intertrochanteric fracture.    - pt with mechanical fall and now with hip fx  - No signs of significant ischemia or volume overload.   - EKG is unchanged from her baseline. SR LVH with repol changes.   - Previous TTE shows severe AS, mild-moderate aortic regurgitation, LV systolic function estimated 60-65%.  - BP elevated on admission, likely 2/2 pain.   - C/w home amlodipine. pain control    - Currently no active cardiac conditions. No signs of ischemia, ADHF, clinical exam not consistent with severe stenotic valvular disease, no unstable arrhythmias noted. Therefore able to proceed with this emergent orthopendic surgery without any further cardiac workup. Routine hemodynamic monitoring is suggested during the procedure.     - Monitor and replete lytes, keep K>4, Mg>2. 80yo F PMHx of severe aortic stenosis, vagally-mediated presyncope/syncope, ventricular ectopy, moderate carotid atherosclerosis, HTN, HLD. Case was consulted d/t clearance for orthopedic intervention for right intertrochanteric fracture.    - pt with mechanical fall and now with hip fx  - No signs of significant ischemia or volume overload.   - EKG is unchanged from her baseline. SR LVH with repol changes.   - Previous TTE shows severe AS, mild-moderate aortic regurgitation, LV systolic function estimated 60-65%.  - BP elevated on admission, likely 2/2 pain.   - C/w home amlodipine. pain control    -  No signs of ischemia, ADHF,   no unstable arrhythmias noted. Has severe AS at baseline which is not modifiable at this point. Therefore able to proceed with this emergent orthopedic surgery without any further cardiac workup. Routine hemodynamic monitoring is suggested during the procedure.     - Monitor and replete lytes, keep K>4, Mg>2.

## 2025-02-03 NOTE — DISCHARGE NOTE PROVIDER - NSDCCPCAREPLAN_GEN_ALL_CORE_FT
PRINCIPAL DISCHARGE DIAGNOSIS  Diagnosis: Fracture of proximal end of femur  Assessment and Plan of Treatment:       SECONDARY DISCHARGE DIAGNOSES  Diagnosis: Rhabdomyolysis  Assessment and Plan of Treatment:

## 2025-02-03 NOTE — CONSULT NOTE ADULT - ASSESSMENT
Rhabdomyolysis, s/p Fall  Hypokalemia  Rt hip fracture, s/p Fall  Hypertension    Improving CPK levels. Continue IV hydration. Trend BP and titrate BP meds as needed. Potassium supplementation. For OR today.   No contraindications for the planned hip surgery from renal standpoint. Will follow electrolytes and renal function trend.     Further recommendations pending clinical course. Thank you for the courtesy of this referral.

## 2025-02-03 NOTE — DISCHARGE NOTE PROVIDER - PROVIDER TOKENS
PROVIDER:[TOKEN:[[id number]]] PROVIDER:[TOKEN:[[id number]]],PROVIDER:[TOKEN:[[id number]]],PROVIDER:[TOKEN:[[id number]],FOLLOWUP:[2 weeks]]

## 2025-02-03 NOTE — DISCHARGE NOTE PROVIDER - CARE PROVIDER_API CALL
Hari Hedrick  Orthopaedic Surgery  [address] [place] [address]  Phone: [phone]  Fax: [phone]  Follow Up Time:    Hari Hedrick  Orthopaedic Surgery  [address] [place] [address]  Phone: [phone]  Fax: [phone]  Follow Up Time:     Tc Baca  Internal Medicine  [address] [place] [address]  Phone: [phone]  Fax: [phone]  Follow Up Time:     Robert Hurtado  Nephrology  [address] [facility], [address]  [place] [address]  Phone: [phone]  Fax: [phone]  Follow Up Time: 2 weeks

## 2025-02-03 NOTE — CONSULT NOTE ADULT - SUBJECTIVE AND OBJECTIVE BOX
[facility]  Dr. Hurtado, Dr. Camacho, Dr. Reich, Dr. Angel, Dr. Montanez, Dr. Castillo                                        [facility], [facility], [address]                                                 [address] [place], [place] [address] [place] [address]  Ph: [phone]  Fax: [phone]                                         Ph: [phone]  Fax: [phone]      Patient is a 79y old  Female who presents with a chief complaint of R Hip Fracture (03 Feb 2025 08:33)    HPI:  78yo F PMHx HTN, HLD who presents with fall, right hip pain and prolonged down time s/p fall. Patient states that Thursday morning, she got out of bed while changing, slipped and fell immediately injuring her right hip and had been laying on the ground since then. She was unable to get up on her own, when today her son came to visit and found her. She reports she was laying on her back when she was found. Rates her R hip pain at 5/10 currently. Denies any AC use, denies LOC or head trauma at time of fall, primary complaint is right hip pain at this time. Denies any syncopal episodes recently, anginal symptoms or shortness of breath at rest or with exertion.    In the ED:  T: 97F, HR: 73, BP: 180/84, RR: 22, SpO2: 92% on RA  Labs notable for WBC: 15.28, K: 3.2, BUN/Cr: 29/0.72, glucose: 137, total bili: 2.6, AST/ALT: 65/53, CK: 1423  UA: protein: 30, trace ketones, occasional bacteria, SEC present, glucose: 250, negative for LE + nitrite  EKG: NSR, LVH with repolarization abnormality; cannot rule out Septal infarct, age undetermined - QTc: 473ms  CXR: The heart is unremarkable. The lungs are clear. No acute osseous abnormality.  XR Hip w/ Pelvis, Femur: Acute severely comminuted and moderately displaced intertrochanteric fracture of the right femur. No dislocation.  CT Head: Age-appropriate involutional and ischemic gliotic changes. No change since 11/28/2022. No hemorrhage  CT C-spine: Degenerative changes. No acute fractures or dislocations  CT C/A/P: 1. Markedly comminuted and angulated right intertrochanteric femoral fracture.  2. No additional acute traumatic injury identified within the chest, abdomen, or pelvis.  3. Extensive aortic valve leaflet calcifications are present. Correlation for aortic stenosis is suggested.  Received in the ED: 1L NS bolus x1, IV KCl 10mEq x3 (02 Feb 2025 16:12)      PAST MEDICAL HISTORY:  AS (aortic stenosis)    HTN (hypertension)    HLD (hyperlipidemia)    Dry eyes    Colon cancer    Acid reflux    OP (osteoporosis)    AV block    Syncope    Carotid artery disease    No pertinent past medical history        PAST SURGICAL HISTORY:  H/O tubal ligation    S/P colon polypectomy        FAMILY HISTORY:      SOCIAL HISTORY:    Allergies    Demerol HCl (Hives)  Demerol HCl (Vomiting)    Intolerances      Home Medications:  amLODIPine 10 mg oral tablet: 1 tab(s) orally (02 Feb 2025 17:13)  Calcium 600+D oral tablet: 1 tab(s) orally 2 times a day (02 Feb 2025 17:13)  Crestor 5 mg oral tablet: 1 tab(s) orally once a day (at bedtime) (02 Feb 2025 17:32)  Lexapro 20 mg oral tablet: 1 tab(s) orally once a day (02 Feb 2025 17:26)  Pepcid 40 mg oral tablet: 1 tab(s) orally once a day (02 Feb 2025 17:26)    MEDICATIONS  (STANDING):  amLODIPine   Tablet 10 milliGRAM(s) Oral daily  calcium carbonate 1250 mG  + Vitamin D (OsCal 500 + D) 1 Tablet(s) Oral daily  ceFAZolin   IVPB 2000 milliGRAM(s) IV Intermittent once  escitalopram 20 milliGRAM(s) Oral daily  potassium chloride  10 mEq/100 mL IVPB 10 milliEquivalent(s) IV Intermittent every 1 hour  rosuvastatin 5 milliGRAM(s) Oral at bedtime  sodium chloride 0.9%. 1000 milliLiter(s) (50 mL/Hr) IV Continuous <Continuous>    MEDICATIONS  (PRN):  acetaminophen     Tablet .. 650 milliGRAM(s) Oral every 6 hours PRN Temp greater or equal to 38C (100.4F), Mild Pain (1 - 3)  aluminum hydroxide/magnesium hydroxide/simethicone Suspension 30 milliLiter(s) Oral every 4 hours PRN Dyspepsia  HYDROmorphone  Injectable 0.5 milliGRAM(s) IV Push every 6 hours PRN Severe Pain (7 - 10)  melatonin 3 milliGRAM(s) Oral at bedtime PRN Insomnia  traMADol 50 milliGRAM(s) Oral every 6 hours PRN Moderate Pain (4 - 6)      REVIEW OF SYSTEMS:  General:   Respiratory: No cough, SOB  Cardiovascular: No CP or Palpitations	  Gastrointestinal: No nausea, Vomiting. No diarrhea  Genitourinary: No urinary complaints	  Musculoskeletal: No leg swelling, No new rash or lesions	  Neurological: 	  all other systems negative    T(F): 98.4 (02-03-25 @ 04:46), Max: 98.7 (02-02-25 @ 18:21)  HR: 69 (02-03-25 @ 04:46) (69 - 81)  BP: 112/65 (02-03-25 @ 04:46) (101/66 - 180/84)  RR: 20 (02-03-25 @ 04:46) (18 - 22)  SpO2: 95% (02-03-25 @ 04:46) (92% - 100%)  Wt(kg): --    PHYSICAL EXAM:  General: NAD  Respiratory: b/l air entry  Cardiovascular: S1 S2  Gastrointestinal: soft  Extremities: edema        02-03    138  |  102  |  30[H]  ----------------------------<  111[H]  3.4[L]   |  32[H]  |  0.57    Ca    8.6      03 Feb 2025 07:37  Phos  2.4     02-03  Mg     2.7     02-03    TPro  5.6[L]  /  Alb  2.7[L]  /  TBili  2.5[H]  /  DBili  x   /  AST  57[H]  /  ALT  43  /  AlkPhos  72  02-03                          11.5   12.60 )-----------( 157      ( 03 Feb 2025 07:37 )             33.6       Hemoglobin: 11.5 g/dL (02-03 @ 07:37)  Hematocrit: 33.6 % (02-03 @ 07:37)  Potassium: 3.4 mmol/L (02-03 @ 07:37)  Blood Urea Nitrogen: 30 mg/dL (02-03 @ 07:37)      Creatinine, Serum: 0.57 (02-03 @ 07:37)  Creatinine, Serum: 0.56 (02-03 @ 05:15)  Creatinine, Serum: 0.72 (02-02 @ 13:29)      Urinalysis Basic - ( 03 Feb 2025 07:37 )    Color: x / Appearance: x / SG: x / pH: x  Gluc: 111 mg/dL / Ketone: x  / Bili: x / Urobili: x   Blood: x / Protein: x / Nitrite: x   Leuk Esterase: x / RBC: x / WBC x   Sq Epi: x / Non Sq Epi: x / Bacteria: x      LIVER FUNCTIONS - ( 03 Feb 2025 07:37 )  Alb: 2.7 g/dL / Pro: 5.6 g/dL / ALK PHOS: 72 U/L / ALT: 43 U/L / AST: 57 U/L / GGT: x                       I&O's Detail    02 Feb 2025 07:01  -  03 Feb 2025 07:00  --------------------------------------------------------  IN:    sodium chloride 0.9%: 500 mL  Total IN: 500 mL    OUT:    Indwelling Catheter - Urethral (mL): 400 mL  Total OUT: 400 mL    Total NET: 100 mL            Urinalysis with Rflx Culture (collected 02 Feb 2025 13:58)           [facility]ph[facility]  Dr. Hurtado, Dr. Camacho, Dr. Reich, Dr. Angel, Dr. Montanez, Dr. Castillo                                        [facility], [facility], [address]                                                 [address] [place], [place] [address] [place] [address]  Ph: [phone]  Fax: [phone]                                         Ph: [phone]  Fax: [phone]      Patient is a 79y old  Female who presents with a chief complaint of R Hip Fracture (03 Feb 2025 08:33)    HPI:  80yo F PMHx HTN, HLD who presents with fall, right hip pain and prolonged down time s/p fall. Patient states that Thursday morning, she got out of bed while changing, slipped and fell immediately injuring her right hip and had been laying on the ground since then. She was unable to get up on her own, when today her son came to visit and found her. She reports she was laying on her back when she was found. Rates her R hip pain at 5/10 currently. Denies any AC use, denies LOC or head trauma at time of fall, primary complaint is right hip pain at this time. Denies any syncopal episodes recently, anginal symptoms or shortness of breath at rest or with exertion.    In the ED:  T: 97F, HR: 73, BP: 180/84, RR: 22, SpO2: 92% on RA  Labs notable for WBC: 15.28, K: 3.2, BUN/Cr: 29/0.72, glucose: 137, total bili: 2.6, AST/ALT: 65/53, CK: 1423  UA: protein: 30, trace ketones, occasional bacteria, SEC present, glucose: 250, negative for LE + nitrite  EKG: NSR, LVH with repolarization abnormality; cannot rule out Septal infarct, age undetermined - QTc: 473ms  CXR: The heart is unremarkable. The lungs are clear. No acute osseous abnormality.  XR Hip w/ Pelvis, Femur: Acute severely comminuted and moderately displaced intertrochanteric fracture of the right femur. No dislocation.  CT Head: Age-appropriate involutional and ischemic gliotic changes. No change since 11/28/2022. No hemorrhage  CT C-spine: Degenerative changes. No acute fractures or dislocations  CT C/A/P: 1. Markedly comminuted and angulated right intertrochanteric femoral fracture.  2. No additional acute traumatic injury identified within the chest, abdomen, or pelvis.  3. Extensive aortic valve leaflet calcifications are present. Correlation for aortic stenosis is suggested.  Received in the ED: 1L NS bolus x1, IV KCl 10mEq x3 (02 Feb 2025 16:12)    Renal consult called for rhabdomyolysis. History obtained from chart and patient.       PAST MEDICAL HISTORY:  AS (aortic stenosis)    HTN (hypertension)    HLD (hyperlipidemia)    Dry eyes    Colon cancer    Acid reflux    OP (osteoporosis)    AV block    Syncope    Carotid artery disease    No pertinent past medical history        PAST SURGICAL HISTORY:  H/O tubal ligation    S/P colon polypectomy        FAMILY HISTORY:      SOCIAL HISTORY: No smoking or alcohol use     Allergies    Demerol HCl (Hives)  Demerol HCl (Vomiting)    Intolerances      Home Medications:  amLODIPine 10 mg oral tablet: 1 tab(s) orally (02 Feb 2025 17:13)  Calcium 600+D oral tablet: 1 tab(s) orally 2 times a day (02 Feb 2025 17:13)  Crestor 5 mg oral tablet: 1 tab(s) orally once a day (at bedtime) (02 Feb 2025 17:32)  Lexapro 20 mg oral tablet: 1 tab(s) orally once a day (02 Feb 2025 17:26)  Pepcid 40 mg oral tablet: 1 tab(s) orally once a day (02 Feb 2025 17:26)    MEDICATIONS  (STANDING):  amLODIPine   Tablet 10 milliGRAM(s) Oral daily  calcium carbonate 1250 mG  + Vitamin D (OsCal 500 + D) 1 Tablet(s) Oral daily  ceFAZolin   IVPB 2000 milliGRAM(s) IV Intermittent once  escitalopram 20 milliGRAM(s) Oral daily  potassium chloride  10 mEq/100 mL IVPB 10 milliEquivalent(s) IV Intermittent every 1 hour  rosuvastatin 5 milliGRAM(s) Oral at bedtime  sodium chloride 0.9%. 1000 milliLiter(s) (50 mL/Hr) IV Continuous <Continuous>    MEDICATIONS  (PRN):  acetaminophen     Tablet .. 650 milliGRAM(s) Oral every 6 hours PRN Temp greater or equal to 38C (100.4F), Mild Pain (1 - 3)  aluminum hydroxide/magnesium hydroxide/simethicone Suspension 30 milliLiter(s) Oral every 4 hours PRN Dyspepsia  HYDROmorphone  Injectable 0.5 milliGRAM(s) IV Push every 6 hours PRN Severe Pain (7 - 10)  melatonin 3 milliGRAM(s) Oral at bedtime PRN Insomnia  traMADol 50 milliGRAM(s) Oral every 6 hours PRN Moderate Pain (4 - 6)      REVIEW OF SYSTEMS:  General: no distress  Respiratory: No cough, SOB  Cardiovascular: No CP or Palpitations	  Gastrointestinal: No nausea, Vomiting. No diarrhea  Genitourinary: No urinary complaints	  Musculoskeletal: No new rash or lesions, + hip pain  all other systems negative    T(F): 98.4 (02-03-25 @ 04:46), Max: 98.7 (02-02-25 @ 18:21)  HR: 69 (02-03-25 @ 04:46) (69 - 81)  BP: 112/65 (02-03-25 @ 04:46) (101/66 - 180/84)  RR: 20 (02-03-25 @ 04:46) (18 - 22)  SpO2: 95% (02-03-25 @ 04:46) (92% - 100%)  Wt(kg): --    PHYSICAL EXAM:  General: NAD  Respiratory: b/l air entry  Cardiovascular: S1 S2  Gastrointestinal: soft  Extremities: edema        02-03    138  |  102  |  30[H]  ----------------------------<  111[H]  3.4[L]   |  32[H]  |  0.57    Ca    8.6      03 Feb 2025 07:37  Phos  2.4     02-03  Mg     2.7     02-03    TPro  5.6[L]  /  Alb  2.7[L]  /  TBili  2.5[H]  /  DBili  x   /  AST  57[H]  /  ALT  43  /  AlkPhos  72  02-03                          11.5   12.60 )-----------( 157      ( 03 Feb 2025 07:37 )             33.6       Hemoglobin: 11.5 g/dL (02-03 @ 07:37)  Hematocrit: 33.6 % (02-03 @ 07:37)  Potassium: 3.4 mmol/L (02-03 @ 07:37)  Blood Urea Nitrogen: 30 mg/dL (02-03 @ 07:37)      Creatinine, Serum: 0.57 (02-03 @ 07:37)  Creatinine, Serum: 0.56 (02-03 @ 05:15)  Creatinine, Serum: 0.72 (02-02 @ 13:29)      Urinalysis Basic - ( 03 Feb 2025 07:37 )    Color: x / Appearance: x / SG: x / pH: x  Gluc: 111 mg/dL / Ketone: x  / Bili: x / Urobili: x   Blood: x / Protein: x / Nitrite: x   Leuk Esterase: x / RBC: x / WBC x   Sq Epi: x / Non Sq Epi: x / Bacteria: x      LIVER FUNCTIONS - ( 03 Feb 2025 07:37 )  Alb: 2.7 g/dL / Pro: 5.6 g/dL / ALK PHOS: 72 U/L / ALT: 43 U/L / AST: 57 U/L / GGT: x                       I&O's Detail    02 Feb 2025 07:01  -  03 Feb 2025 07:00  --------------------------------------------------------  IN:    sodium chloride 0.9%: 500 mL  Total IN: 500 mL    OUT:    Indwelling Catheter - Urethral (mL): 400 mL  Total OUT: 400 mL    Total NET: 100 mL          < from: US Duplex Venous Lower Ext Complete, Bilateral (02.02.25 @ 18:45) >    ACC: [id number] EXAM:  US DPLX LWR EXT VEINS COMPL BI   ORDERED BY: GERMAN DIAZ     PROCEDURE DATE:  02/02/2025          INTERPRETATION:  CLINICAL INFORMATION: Found down on ground. Evaluate for   DVT.    COMPARISON: None available.    TECHNIQUE: Duplex sonography of the BILATERAL LOWER extremity veins with   color and spectral Doppler, with and without compression.    FINDINGS:    RIGHT:  Normal compressibility of the RIGHT common femoral, femoral and popliteal   veins.  Doppler examination shows normal spontaneous and phasic flow.  No RIGHT calf vein thrombosis is detected.    LEFT:  Normal compressibility of the LEFT common femoral, femoral and popliteal   veins.  Doppler examination shows normal spontaneous and phasic flow.  No LEFT calf vein thrombosis is detected.    IMPRESSION:  No evidence of deep venous thrombosis in either lower extremity.            --- End of Report ---            MICHAEL MOSES MD; Attending Radiologist  This document has been electronically signed. Feb 2 2025  6:47PM    < end of copied text >  < from: Xray Chest 1 View- PORTABLE-Urgent (Xray Chest 1 View- PORTABLE-Urgent .) (02.02.25 @ 14:58) >    ACC: [id number] EXAM:  XR HIP WITH PELV 2-3V RT   ORDERED BY:  ARNULFO JOLLY     ACC: [id number] EXAM:  XR FEMUR 2 VIEWS RT   ORDERED BY:  ARNULFO JOLLY     ACC: [id number] EXAM:  XR CHEST PORTABLE URGENT 1V   ORDERED BY:  ARNULFO JOLLY     PROCEDURE DATE:  02/02/2025        INTERPRETATION:  Clinical Information: Hip fracture    Technique: 1 AP chest x-ray(s). 2 views right femur. 2 views right hip.   One view pelvis.    Comparison: None    Findings/  Impression:    Chest: The heart is unremarkable. The lungs areclear. No acute osseous   abnormality.    Musculoskeletal: Acute severely comminuted and moderately displaced   intertrochanteric fracture of the right femur. No dislocation.    --- End of Report ---            BRI MARTEL MD; Attending Interventional Radiologist  This document has been electronically signed. Feb 2 2025  3:02PM    < end of copied text >  < from: CT Abdomen and Pelvis No Cont (02.02.25 @ 14:25) >    ACC: [id number] EXAM:  CT ABDOMEN AND PELVIS   ORDERED BY:  ARNULFO JOLLY     ACC: [id number] EXAM:  CT CHEST   ORDERED BY:  ARNULFO JOLLY     PROCEDURE DATE:  02/02/2025          INTERPRETATION:  CLINICAL INFORMATION: Status post fall and down for 2-3   days.    COMPARISON: Lumbar spine CT dated 11/28/2022.    CONTRAST/COMPLICATIONS:  IV Contrast: NONE  Oral Contrast: NONE    PROCEDURE:  CT of the Chest, Abdomen and Pelvis was performed.  Sagittal and coronal reformats were performed.    FINDINGS:  CHEST:  LUNGS AND LARGE AIRWAYS: Patent central airways. Bilateral lower lobe   atelectasis. There is a 3 mm nodule within the right upper lobe (310-112)   adjacent to a calcified granuloma and a 2 mm right lower lobe nodule   (310-155), favored be benign. The lungs are otherwise clear.  PLEURA: No pleural effusion. No pneumothorax.  VESSELS: Aortic calcifications. No aortic aneurysm.  HEART: Heart size is normal. No pericardial effusion. Extensive aortic   valve leaflet calcifications are present and coronary artery   calcification is are present.  MEDIASTINUM AND ANASTACIO: No lymphadenopathy.  CHEST WALL AND LOWER NECK: Within normal limits.    ABDOMEN AND PELVIS:  LIVER: There is a 6 cm low-attenuation lesion within segment IVb of the   liver (390-74),which is too small to characterize and may represent a   small cyst. Otherwise unremarkable noncontrast appearance of the liver.  BILE DUCTS: Normal caliber.  GALLBLADDER: Within normal limits.  SPLEEN: Within normal limits.  PANCREAS: Within normal limits.  ADRENALS: Within normal limits.  KIDNEYS/URETERS: There is a 2 mm nonobstructing left lower pole renal   contours. No hydronephrosis.    BLADDER: Decompressed with a Courtney catheter in place.  REPRODUCTIVE ORGANS: Uterus and adnexa within normal limits.    BOWEL: No bowel obstruction. Appendix is surgically absent. Scattered   diverticulosis of the sigmoid colon without surrounding inflammatory   changes.  PERITONEUM/RETROPERITONEUM: Within normal limits.  VESSELS: Atherosclerotic changes.No aortic aneurysm. Incidental note of   a retroaortic left renal vein.  LYMPH NODES: No lymphadenopathy.  ABDOMINAL WALL: Within normal limits.  BONES: The bones are osteopenic and there are multilevel degenerative   changes of the spine. Vertebral body heights are maintained.    Markedly comminuted intertrochanteric right femoral fracture with greater   than 90% angulation of the fracture apex. No dislocation. No additional   fractures identified.    IMPRESSION:  1. Markedly comminuted and angulated right intertrochanteric femoral   fracture.  2. No additional acute traumatic injury identified within the chest,   abdomen, or pelvis.  3. Extensive aortic valve leaflet calcifications are present. Correlation   for aortic stenosis is suggested.        --- End of Report ---            CRISTY LEMOS MD; Attending Radiologist  This document has been electronically signed. Feb 2 2025  3:08PM    < end of copied text >

## 2025-02-03 NOTE — DISCHARGE NOTE PROVIDER - ATTENDING DISCHARGE PHYSICAL EXAMINATION:
GENERAL: NAD, lying in bed comfortably  HEAD:  Normocephalic  EYES:  conjunctiva and sclera clear  ENT: Moist mucous membranes  NECK: Supple  CHEST/LUNG: Clear to auscultation bilaterally; No rales or rhonchi; no wheezing. Unlabored respirations  HEART: Regular rate and rhythm; S1S2+  ABDOMEN: Bowel sounds present; Soft, Nontender, Nondistended.   EXTREMITIES:  + distal Peripheral Pulses;  No cyanosis, or edema, R hip Sx site Aquacel dressing on, C/D/I, NO SIGN OF ACTIVE bleed   NERVOUS SYSTEM:  Alert & Oriented X3;  No gross focal deficits   MSK: moves all extremities, ROM and strength decreased RLE due to post Sx.   SKIN: No rashes

## 2025-02-04 DIAGNOSIS — D62 ACUTE POSTHEMORRHAGIC ANEMIA: ICD-10-CM

## 2025-02-04 LAB
ALBUMIN SERPL ELPH-MCNC: 2.2 G/DL — LOW (ref 3.3–5)
ALP SERPL-CCNC: 78 U/L — SIGNIFICANT CHANGE UP (ref 40–120)
ALT FLD-CCNC: 83 U/L — HIGH (ref 12–78)
ANION GAP SERPL CALC-SCNC: 8 MMOL/L — SIGNIFICANT CHANGE UP (ref 5–17)
AST SERPL-CCNC: 83 U/L — HIGH (ref 15–37)
BASOPHILS # BLD AUTO: 0.02 K/UL — SIGNIFICANT CHANGE UP (ref 0–0.2)
BASOPHILS NFR BLD AUTO: 0.2 % — SIGNIFICANT CHANGE UP (ref 0–2)
BILIRUB DIRECT SERPL-MCNC: 0.4 MG/DL — HIGH (ref 0–0.3)
BILIRUB SERPL-MCNC: 1.6 MG/DL — HIGH (ref 0.2–1.2)
BUN SERPL-MCNC: 24 MG/DL — HIGH (ref 7–23)
CALCIUM SERPL-MCNC: 8.1 MG/DL — LOW (ref 8.5–10.1)
CHLORIDE SERPL-SCNC: 100 MMOL/L — SIGNIFICANT CHANGE UP (ref 96–108)
CK SERPL-CCNC: 1181 U/L — HIGH (ref 26–192)
CO2 SERPL-SCNC: 27 MMOL/L — SIGNIFICANT CHANGE UP (ref 22–31)
CREAT SERPL-MCNC: 0.67 MG/DL — SIGNIFICANT CHANGE UP (ref 0.5–1.3)
EGFR: 89 ML/MIN/1.73M2 — SIGNIFICANT CHANGE UP
EOSINOPHIL # BLD AUTO: 0.02 K/UL — SIGNIFICANT CHANGE UP (ref 0–0.5)
EOSINOPHIL NFR BLD AUTO: 0.2 % — SIGNIFICANT CHANGE UP (ref 0–6)
GLUCOSE SERPL-MCNC: 167 MG/DL — HIGH (ref 70–99)
HCT VFR BLD CALC: 27.9 % — LOW (ref 34.5–45)
HGB BLD-MCNC: 9.7 G/DL — LOW (ref 11.5–15.5)
IMM GRANULOCYTES NFR BLD AUTO: 0.8 % — SIGNIFICANT CHANGE UP (ref 0–0.9)
LYMPHOCYTES # BLD AUTO: 1.12 K/UL — SIGNIFICANT CHANGE UP (ref 1–3.3)
LYMPHOCYTES # BLD AUTO: 8.6 % — LOW (ref 13–44)
MAGNESIUM SERPL-MCNC: 2.4 MG/DL — SIGNIFICANT CHANGE UP (ref 1.6–2.6)
MCHC RBC-ENTMCNC: 28.3 PG — SIGNIFICANT CHANGE UP (ref 27–34)
MCHC RBC-ENTMCNC: 34.8 G/DL — SIGNIFICANT CHANGE UP (ref 32–36)
MCV RBC AUTO: 81.3 FL — SIGNIFICANT CHANGE UP (ref 80–100)
MONOCYTES # BLD AUTO: 0.91 K/UL — HIGH (ref 0–0.9)
MONOCYTES NFR BLD AUTO: 7 % — SIGNIFICANT CHANGE UP (ref 2–14)
NEUTROPHILS # BLD AUTO: 10.86 K/UL — HIGH (ref 1.8–7.4)
NEUTROPHILS NFR BLD AUTO: 83.2 % — HIGH (ref 43–77)
NRBC # BLD: 0 /100 WBCS — SIGNIFICANT CHANGE UP (ref 0–0)
NRBC BLD-RTO: 0 /100 WBCS — SIGNIFICANT CHANGE UP (ref 0–0)
PHOSPHATE SERPL-MCNC: 3 MG/DL — SIGNIFICANT CHANGE UP (ref 2.5–4.5)
PLATELET # BLD AUTO: 167 K/UL — SIGNIFICANT CHANGE UP (ref 150–400)
POTASSIUM SERPL-MCNC: 3.9 MMOL/L — SIGNIFICANT CHANGE UP (ref 3.5–5.3)
POTASSIUM SERPL-SCNC: 3.9 MMOL/L — SIGNIFICANT CHANGE UP (ref 3.5–5.3)
PROT SERPL-MCNC: 5.3 G/DL — LOW (ref 6–8.3)
RBC # BLD: 3.43 M/UL — LOW (ref 3.8–5.2)
RBC # FLD: 13.7 % — SIGNIFICANT CHANGE UP (ref 10.3–14.5)
SODIUM SERPL-SCNC: 135 MMOL/L — SIGNIFICANT CHANGE UP (ref 135–145)
WBC # BLD: 13.03 K/UL — HIGH (ref 3.8–10.5)
WBC # FLD AUTO: 13.03 K/UL — HIGH (ref 3.8–10.5)

## 2025-02-04 PROCEDURE — 99233 SBSQ HOSP IP/OBS HIGH 50: CPT

## 2025-02-04 PROCEDURE — 99232 SBSQ HOSP IP/OBS MODERATE 35: CPT

## 2025-02-04 RX ORDER — BACTERIOSTATIC SODIUM CHLORIDE 0.9 %
500 VIAL (ML) INJECTION ONCE
Refills: 0 | Status: COMPLETED | OUTPATIENT
Start: 2025-02-04 | End: 2025-02-04

## 2025-02-04 RX ORDER — MECOBAL/LEVOMEFOLAT CA/B6 PHOS 2-3-35 MG
1 TABLET ORAL DAILY
Refills: 0 | Status: CANCELLED | OUTPATIENT
Start: 2025-02-04 | End: 2025-02-05

## 2025-02-04 RX ORDER — BACTERIOSTATIC SODIUM CHLORIDE 0.9 %
250 VIAL (ML) INJECTION ONCE
Refills: 0 | Status: COMPLETED | OUTPATIENT
Start: 2025-02-04 | End: 2025-02-04

## 2025-02-04 RX ORDER — OXYCODONE HYDROCHLORIDE 30 MG/1
5 TABLET ORAL ONCE
Refills: 0 | Status: DISCONTINUED | OUTPATIENT
Start: 2025-02-04 | End: 2025-02-04

## 2025-02-04 RX ORDER — ACETAMINOPHEN 160 MG/5ML
1000 SUSPENSION ORAL ONCE
Refills: 0 | Status: COMPLETED | OUTPATIENT
Start: 2025-02-04 | End: 2025-02-04

## 2025-02-04 RX ORDER — BACTERIOSTATIC SODIUM CHLORIDE 0.9 %
1000 VIAL (ML) INJECTION
Refills: 0 | Status: DISCONTINUED | OUTPATIENT
Start: 2025-02-04 | End: 2025-02-05

## 2025-02-04 RX ORDER — ASCORBIC ACID 500 MG/ML
500 VIAL (ML) INJECTION
Refills: 0 | Status: CANCELLED | OUTPATIENT
Start: 2025-02-04 | End: 2025-02-05

## 2025-02-04 RX ORDER — ENOXAPARIN SODIUM 100 MG/ML
40 INJECTION SUBCUTANEOUS EVERY 24 HOURS
Refills: 0 | Status: DISCONTINUED | OUTPATIENT
Start: 2025-02-04 | End: 2025-02-05

## 2025-02-04 RX ORDER — SODIUM CHLORIDE 9 G/ML
250 INJECTION, SOLUTION INTRAVENOUS ONCE
Refills: 0 | Status: DISCONTINUED | OUTPATIENT
Start: 2025-02-04 | End: 2025-02-04

## 2025-02-04 RX ORDER — ACETAMINOPHEN, DIPHENHYDRAMINE HCL, PHENYLEPHRINE HCL 325; 25; 5 MG/1; MG/1; MG/1
5 TABLET ORAL ONCE
Refills: 0 | Status: COMPLETED | OUTPATIENT
Start: 2025-02-04 | End: 2025-02-04

## 2025-02-04 RX ORDER — ACETAMINOPHEN, DIPHENHYDRAMINE HCL, PHENYLEPHRINE HCL 325; 25; 5 MG/1; MG/1; MG/1
5 TABLET ORAL AT BEDTIME
Refills: 0 | Status: DISCONTINUED | OUTPATIENT
Start: 2025-02-04 | End: 2025-02-04

## 2025-02-04 RX ADMIN — ACETAMINOPHEN 975 MILLIGRAM(S): 160 SUSPENSION ORAL at 22:24

## 2025-02-04 RX ADMIN — ACETAMINOPHEN, DIPHENHYDRAMINE HCL, PHENYLEPHRINE HCL 5 MILLIGRAM(S): 325; 25; 5 TABLET ORAL at 03:15

## 2025-02-04 RX ADMIN — OXYCODONE HYDROCHLORIDE 5 MILLIGRAM(S): 30 TABLET ORAL at 15:00

## 2025-02-04 RX ADMIN — Medication 100 MILLIGRAM(S): at 05:33

## 2025-02-04 RX ADMIN — OXYCODONE HYDROCHLORIDE 5 MILLIGRAM(S): 30 TABLET ORAL at 18:19

## 2025-02-04 RX ADMIN — ACETAMINOPHEN 975 MILLIGRAM(S): 160 SUSPENSION ORAL at 21:54

## 2025-02-04 RX ADMIN — Medication 75 MILLILITER(S): at 14:07

## 2025-02-04 RX ADMIN — ENOXAPARIN SODIUM 40 MILLIGRAM(S): 100 INJECTION SUBCUTANEOUS at 11:15

## 2025-02-04 RX ADMIN — Medication 500 MILLILITER(S): at 00:30

## 2025-02-04 RX ADMIN — OXYCODONE HYDROCHLORIDE 5 MILLIGRAM(S): 30 TABLET ORAL at 09:22

## 2025-02-04 RX ADMIN — OXYCODONE HYDROCHLORIDE 5 MILLIGRAM(S): 30 TABLET ORAL at 10:20

## 2025-02-04 RX ADMIN — OXYCODONE HYDROCHLORIDE 5 MILLIGRAM(S): 30 TABLET ORAL at 18:58

## 2025-02-04 RX ADMIN — PANTOPRAZOLE 40 MILLIGRAM(S): 20 TABLET, DELAYED RELEASE ORAL at 05:36

## 2025-02-04 RX ADMIN — Medication 500 MILLILITER(S): at 02:12

## 2025-02-04 RX ADMIN — OXYCODONE HYDROCHLORIDE 5 MILLIGRAM(S): 30 TABLET ORAL at 14:05

## 2025-02-04 RX ADMIN — ACETAMINOPHEN 1000 MILLIGRAM(S): 160 SUSPENSION ORAL at 12:35

## 2025-02-04 RX ADMIN — ACETAMINOPHEN 400 MILLIGRAM(S): 160 SUSPENSION ORAL at 02:59

## 2025-02-04 RX ADMIN — Medication 5 MILLIGRAM(S): at 21:54

## 2025-02-04 RX ADMIN — ACETAMINOPHEN 400 MILLIGRAM(S): 160 SUSPENSION ORAL at 11:59

## 2025-02-04 RX ADMIN — Medication 75 MILLILITER(S): at 13:52

## 2025-02-04 RX ADMIN — ACETAMINOPHEN 1000 MILLIGRAM(S): 160 SUSPENSION ORAL at 03:45

## 2025-02-04 RX ADMIN — Medication 1000 MILLILITER(S): at 14:05

## 2025-02-04 NOTE — PHYSICAL THERAPY INITIAL EVALUATION ADULT - PERTINENT HX OF CURRENT PROBLEM, REHAB EVAL
78 yo F PMHx HTN, HLD who presents with fall, right hip pain and prolonged down time s/p fall. Patient states that Thursday morning, she got out of bed while changing, slipped and fell immediately injuring her right hip and had been laying on the ground since then. She was unable to get up on her own, when today her son came to visit and found her. She reports she was laying on her back when she was found. Rates her R hip pain at 5/10 currently. Denies any AC use, denies LOC or head trauma at time of fall, primary complaint is right hip pain at this time. Denies any syncopal episodes recently, anginal symptoms or shortness of breath at rest or with exertion. Pt. s/p subtrochanteric intramedullary rodding of R femur

## 2025-02-04 NOTE — OCCUPATIONAL THERAPY INITIAL EVALUATION ADULT - ADL RETRAINING, OT EVAL
Patient will dress upper body with no assistance and set-up while seated in 3-5 sessions. Patient will dress lower body with minimal assistance, AE as needed in 3-5 sessions.

## 2025-02-04 NOTE — DIETITIAN INITIAL EVALUATION ADULT - PROBLEM SELECTOR PLAN 1
Pt presenting to ED s/p fall on R hip Thursday  - XR Hip w/ Pelvis, Femur: Acute severely comminuted and moderately displaced intertrochanteric fracture of the right femur. No dislocation.  - CT C/A/P: Markedly comminuted and angulated right intertrochanteric femoral fracture. No additional acute traumatic injury identified within the chest, abdomen, or pelvis.  - Orthopedic surgery consulted -> recs appreciated  - Plan for OR tomorrow with Dr. Bazzi  - NWB RLE  - Pain control: Tylenol 650mg q6h for mild pain, tramadol 50mg q6h for mod pain, Dilaudid 0.5mg q6h for sev pain  - NPO after midnight, except medications  - Hold chemical DVT ppx after midnight, SCDs OK per ortho  - F/u b/l LE doppler to r/o DVT  - Patient currently has no active cardiac conditions. No signs of ischemia, ADHF, no unstable arrhythmias noted. Baseline functional capacity is >4 METS. RCRI score is 0. Patient is currently hemodynamically stable. Patient is medically optimized at this time and understands the inherent risks of surgery. Routine hemodynamic monitoring is suggested.

## 2025-02-04 NOTE — OCCUPATIONAL THERAPY INITIAL EVALUATION ADULT - DIAGNOSIS, OT EVAL
Patient with decreased ADL status and impairments with functional mobility. Patient requires assistance with ADL's and transfers due to decreased AROM/strength right LE, decreased endurance and impaired sitting/standing balance.

## 2025-02-04 NOTE — OCCUPATIONAL THERAPY INITIAL EVALUATION ADULT - PERTINENT HX OF CURRENT PROBLEM, REHAB EVAL
80 y/o female with PMH HTN, HLD who presents with fall, right hip pain and prolonged down time s/p fall. Patient states that Thursday morning, she got out of bed while changing, slipped and fell immediately injuring her right hip and had been laying on the ground since then. She was unable to get up on her own, when today her son came to visit and found her. She reports she was laying on her back when she was found. Rates her R hip pain at 5/10 currently. Denies any AC use, denies LOC or head trauma at time of fall, primary complaint is right hip pain at this time.  XR Hip w/ Pelvis, Femur: Acute severely comminuted and moderately displaced intertrochanteric fracture of the right femur. No dislocation. CT Head: Age-appropriate involutional and ischemic gliotic changes. No change since 11/28/2022. No hemorrhage. CT C-spine: Degenerative changes. No acute fractures or dislocations. CT C/A/P: 1. Markedly comminuted and angulated right intertrochanteric femoral fracture. 2. No additional acute traumatic injury identified within the chest, abdomen, or pelvis. 3. Extensive aortic valve leaflet calcifications are present. Patient admitted 2/2/25 with right hip fx and s/p right long IM nailing 2/3/25.

## 2025-02-04 NOTE — DIETITIAN INITIAL EVALUATION ADULT - OTHER INFO
80 y/o female adm s/p fall, right femur fx, rhabdomyolysis (on floor 3 days). PMH HTN, HLD. Pt s/p OR 2/3 Subtrochanteric intramedullary rodding of right femur. PT visited at bedside this morning. Pt tolerated breakfast this morning. Pt's appetite is fair. Pt with a good appetite pta, no wt change. # (last time she weighed herself, could have been a while ago). No overt signs of muscle depletion or fat loss noted. Pt consumes a diet with adequate protein intake pta.

## 2025-02-04 NOTE — PROGRESS NOTE ADULT - PROBLEM SELECTOR PLAN 2
Pt s/p fall on Thursday w/ R hip injury found ~3 days later by son  - CK: 1423  - S/p 1L NS bolus x1 in ED  - C/w IVF --> NS @ 50mL/hr  x20 hours  - Monitor routine CMP  - F/u b/l LE doppler to r/o DVT  - Nephrology (Dr. Hurtado) consulted -> f/u recs
due to Femur fracture and Sx.   symptomatic, will transfuse

## 2025-02-04 NOTE — DIETITIAN INITIAL EVALUATION ADULT - PROBLEM SELECTOR PLAN 4
Chronic issue  - BP on admission: 180/84  - C/w home amlodipine 10mg qd w/ hold parameters  - Continue to monitor routine hemodynamics

## 2025-02-04 NOTE — PROGRESS NOTE ADULT - NS ATTEND AMEND GEN_ALL_CORE FT
80yo F PMHx of severe aortic stenosis, vagally-mediated presyncope/syncope, ventricular ectopy, moderate carotid atherosclerosis, HTN, HLD. Case was consulted d/t clearance for orthopedic intervention for right intertrochanteric fracture.    -Sp fall , laid floor x 4 days found with Subtrochanteric fracture of right femur   -sp right hip IMN   -pain control  -PT, fu ortho     - No meaningful evidence of volume overload. sp ivf this am for soft bp now improved   -hold home norvasc for now   - Previous TTE shows severe AS, mild-moderate aortic regurgitation, LV systolic function estimated 60-65%.  - Strict I/Os, daily weights.

## 2025-02-04 NOTE — DIETITIAN INITIAL EVALUATION ADULT - PROBLEM SELECTOR PLAN 8
Patient unable to recall exact dosing of home meds  - Discussed with patient medications and dosing based on chart from 2023; cross referenced with [employer]  - Lexapro and Pepcid dosing via [employer], will continue for now  - Patient previously on HCTZ 25mg qd, pt does not endorse taking it but unsure - per cardio via [employer] active med  - Patient pharmacy currently closed, primary team to f/u regarding med rec

## 2025-02-04 NOTE — DIETITIAN INITIAL EVALUATION ADULT - PROBLEM SELECTOR PLAN 2
Pt s/p fall on Thursday w/ R hip injury found ~3 days later by son  - CK: 1423  - S/p 1L NS bolus x1 in ED  - C/w IVF --> NS @ 50mL/hr  x20 hours  - Monitor routine CMP  - F/u AM CK  - F/u b/l LE doppler to r/o DVT  - Nephrology (Dr. Hurtado) consulted -> f/u recs

## 2025-02-04 NOTE — DIETITIAN INITIAL EVALUATION ADULT - PROBLEM SELECTOR PLAN 3
Pt w/ extensive aortic valve leaflet calcifications visualized on CT imaging  - Patient reports known history of aortic stenosis murmur; follows with Dr. Villalobos (last seen 9/16/24)  - Patient denies any recent syncope, angina, dyspnea  - F/u TTE  - Cardiology ([facility]) consulted -> f/u recs

## 2025-02-04 NOTE — OCCUPATIONAL THERAPY INITIAL EVALUATION ADULT - AMBULATORY DEVICES NEEDED
Detail Level: Simple
Lesion Type: Cyst
Method: comedo extractor
Curette: No
Anesthesia Volume In Cc: 0.2
Size Of Lesion In Cm (Optional But May Be Required For Some Insurances): 0
Wound Care: Vaseline
Dressing: dry sterile dressing
Epidermal Sutures: 4-0 Ethilon
Epidermal Closure: simple interrupted
Suture Text: The incision was partially closed with
Preparation Text: The area was prepped in the usual clean fashion.
Curette Text (Optional): After the contents were expressed a curette was used to partially remove the cyst wall.
Consent was obtained and risks were reviewed including but not limited to delayed wound healing, infection, need for multiple I and D's, and pain.
Post-Care Instructions: I reviewed with the patient in detail post-care instructions. Patient should keep wound covered and call the office should any redness, pain, swelling or worsening occur.
no

## 2025-02-04 NOTE — CARE COORDINATION ASSESSMENT. - NSPASTMEDSURGHISTORY_GEN_ALL_CORE_FT
PAST MEDICAL & SURGICAL HISTORY:  Carotid artery disease  on follow up      Syncope  last 2012      AV block      OP (osteoporosis)      Acid reflux      Colon cancer  adenocarcinoma      Dry eyes      HLD (hyperlipidemia)      HTN (hypertension)      AS (aortic stenosis)  moderate ( ECHO 2013)  "asymptomatic"      H/O tubal ligation      S/P colon polypectomy

## 2025-02-04 NOTE — PROGRESS NOTE ADULT - PROBLEM SELECTOR PLAN 5
Chronic issue  - C/w home Crestor 5mg qHS  - Lipid panel in AM
Chronic issue  - BP on admission: 180/84  - C/w home amlodipine 10mg qd w/ hold parameters  - Continue to monitor routine hemodynamics

## 2025-02-04 NOTE — CARE COORDINATION ASSESSMENT. - NSCAREPROVIDERS_GEN_ALL_CORE_FT
CARE PROVIDERS:  Accepting Physician: Kvng Baez  Administration: Tessa Up  Administration: Pushpa Davis  Administration: Yusuf Bradley  Administration: Jordon Sotelo  Administration: Jessica Rouse  Administration: June James  Administration: Raul Monge  Admitting: Kvng Baez  Attending: Sigifredo Clemens  Consultant: Randy Cochran  Consultant: Sher Goldberg  Consultant: Lucina Kelly  Consultant: Robert Hurtado  Consultant: Raul Ho  Consultant: Kirsten Kilpatrick  Consultant: Nadiya Rodríguez  Consultant: Caitlyn Hall  Consultant: Nico Garg  Consultant: Jaime Lopez  Consultant: Huy Wolf  Consultant: Marleen Cook  Covering Team: Kendall Navarro  Covering Team: Tuyet Jim  ED Attending: Yaniv Banda  ED Nurse: Thom Noel  ED Nurse 2: Caty Moncada  Intern: Tuyet Stout  Nurse: Sudha Neumann  Nurse: Marta Del Angel  Nurse: Pushpa Morocho  Nurse: Edimla Viramontes  Occupational Therapy: Shona Calabrese  Ordered: ADM, User  Ordered: Doctor, Unknown  Ordered: ServiceAccount, SCMMLM  Ordered: ServiceAccount, SCMMLM  Ordered: ServiceAccount, SCMMLM  Ordered: Jaime Lopez  Outpatient Provider: Duc Rico  Outpatient Provider: Jaime Burgos  Outpatient Provider: Robert Hurtado  Override: Raul Fuentes  Primary Team: Vitor Aragon  Primary Team: Amico, Catherine  Primary Team: Sigifredo Clemens  Primary Team: Karli Byrnes  Primary Team: Laura Alfaro  Registered Dietitian: Gwen Salas  Registered Dietitian: Jenna Hale  : Ludy Young  Team: [facility], Team  UR// Supp. Assoc.: Lulu Momin   CARE PROVIDERS:  Accepting Physician: Kvng Baez  Access Services: Lin Nunez  Administration: Raul Monge  Administration: Tessa Up  Administration: Pushpa Davis  Administration: Yusuf Bradley  Administration: Jordon Sotelo  Administration: Jessica Rouse  Administration: June James  Admitting: Kvng Baez  Attending: Sigifredo Clemens  Consultant: Randy Cochran  Consultant: Sher Goldberg  Consultant: Hari Hedrick  Consultant: Lucina Kelly  Consultant: Robert Hurtado  Consultant: Raul Ho  Consultant: Kirsten Kilpatrick  Consultant: Nadiya Rodríguez  Consultant: Jaime Lopez  Consultant: Huy Wolf  Consultant: Marleen Cook  Consultant: Caitlyn Hall  Consultant: Nico Garg  Covering Team: Tuyet Jim  Covering Team: Kendall Navarro  ED Attending: Yaniv Banda  ED Nurse: Thom Noel  ED Nurse 2: Caty Moncada  HIM/Billing & Coding: Glendy Lopez  Intern: Tuyet Stout  Nurse: Pushpa Morocho  Nurse: Pushpa Vyas  Nurse: Edilma Viramontes  Occupational Therapy: Shona Calabrese  Ordered: ADM, User  Ordered: Doctor, Unknown  Ordered: ServiceAccount, SCMMLM  Ordered: ServiceAccount, SCMMLM  Ordered: ServiceAccount, SCMMLM  Ordered: Jaime Lopez  Outpatient Provider: Duc Rico  Outpatient Provider: Jaime Burgos  Outpatient Provider: Robert Hurtado  Primary Team: Sigifredo Clemens  Primary Team: Vitor Aragon  Primary Team: Karli Byrnes  Registered Dietitian: Gwen Salas  Registered Dietitian: Jenna Hale  : Johnna Lazo  : Ludy Young  Team: [facility], Team  UR// Supp. Assoc.: Lulu Momin

## 2025-02-04 NOTE — PROGRESS NOTE ADULT - PROBLEM SELECTOR PLAN 7
Chronic issue  - Per chart review on Allscripts and [employer], currently on Lexapro 20mg qd  - C/w home medication
Chronic issue  - C/w Pepcid 40mg qd

## 2025-02-04 NOTE — PROVIDER CONTACT NOTE (OTHER) - ACTION/TREATMENT ORDERED:
encourage to take fluids, increased IVF
500 cc NS bolus; Blood transfusion ordered
Bolus 250ml 0.9 NS x1 , then if BP permissible at -110 , patient may be administered Oxycodone or other narcotics for pain , in the incidence SBP < 105 , then Ofrimev will be ordered .

## 2025-02-04 NOTE — PROGRESS NOTE ADULT - PROBLEM SELECTOR PLAN 4
Chronic issue  - BP on admission: 180/84  - C/w home amlodipine 10mg qd w/ hold parameters  - Continue to monitor routine hemodynamics
Pt w/ extensive aortic valve leaflet calcifications visualized on CT imaging  - Patient reports known history of aortic stenosis murmur; follows with Dr. Villalobos (last seen 9/16/24)  - Patient denies any recent syncope, angina, dyspnea  - ECHO 2/3/25     1. Technically difficult image quality.   2. Left ventricular systolic function is normal with an ejection fraction of 58 % by Rodriguez's method of disks.   3. Left atrium is mildly dilated.   4. The peak transaortic velocity is 5.57 m/s, peak transaortic gradient is 124.1 mmHg and mean transaortic gradient is 73.0 mmHg with an LVOT/aortic valve VTI ratio of 0.28. The effective orifice area is estimated at 0.81 cm² by the continuity equation.   5. There is severe calcification of the aortic valve leaflets.   6. There is mild (grade 1) left ventricular diastolic dysfunction.   7. Normal right ventricular systolic function.   8. Thickened pericardium.    - Cardiology eval noted

## 2025-02-04 NOTE — PROGRESS NOTE ADULT - PROBLEM SELECTOR PLAN 3
Pt s/p fall on Thursday w/ R hip injury found ~3 days later by son  - CK: 1423  - S/p 1L NS bolus x1 in ED  - C/w IVF --> NS @ 50mL/hr  x20 hours  - Monitor routine CMP  - F/u b/l LE doppler to r/o DVT  - Nephrology (Dr. Hurtado) consult noted
Pt w/ extensive aortic valve leaflet calcifications visualized on CT imaging  - Patient reports known history of aortic stenosis murmur; follows with Dr. Villalobos (last seen 9/16/24)  - Patient denies any recent syncope, angina, dyspnea  - F/u TTE  - Cardiology ([facility]) consulted -> f/u recs

## 2025-02-04 NOTE — DIETITIAN INITIAL EVALUATION ADULT - NS FNS DIET ORDER
Diet, Regular (02-04-25 @ 01:59)  Diet, Full Liquid (02-03-25 @ 15:34)  Diet, Clear Liquid (02-03-25 @ 15:34)

## 2025-02-04 NOTE — OCCUPATIONAL THERAPY INITIAL EVALUATION ADULT - MD ORDER
OT evaluate and treat. Dupixent Pregnancy And Lactation Text: This medication likely crosses the placenta but the risk for the fetus is uncertain. This medication is excreted in breast milk.

## 2025-02-04 NOTE — CARE COORDINATION ASSESSMENT. - OTHER PERTINENT DISCHARGE PLANNING INFORMATION:
SWI met with pt at bedside. Pt stated she lives in a house alone with 2STE and a flight of stairs to get downstairs. Pt states her HCP is her son Luiz. Pt denies any home care or aid services. At baseline pt does not use any DME and was driving herself. Pt is retired and collects social security and a pension. PT=ROSANNA. Pt understands and is in agreement with that d/c plan. SWI to follow and remain available for any needs. SWI met with pt at bedside. Pt stated she lives in a house alone with 2STE and a flight of stairs to get downstairs. Pt states her HCP is her son Luiz. Pt denies any home care or aid services current or in the past. At baseline pt does not use any DME and was driving herself. Pt is retired and collects social security and a pension. PT=ROSANNA. Pt understands and is in agreement with that d/c plan. SWI to follow and remain available for any needs.

## 2025-02-04 NOTE — PROGRESS NOTE ADULT - PROBLEM SELECTOR PLAN 1
Pt presenting to ED s/p fall on R hip Thursday, likely mechanical fall, was on the floor for 4 days   - XR Hip w/ Pelvis, Femur: Acute severely comminuted and moderately displaced intertrochanteric fracture of the right femur. No dislocation.  - CT C/A/P: Markedly comminuted and angulated right intertrochanteric femoral fracture. No additional acute traumatic injury identified within the chest, abdomen, or pelvis.  - Orthopedic surgery consulted -> recs appreciated  - Plan for OR today with Dr. Bazzi  - NWB RLE  - Pain control: Tylenol 650mg q6h for mild pain, tramadol 50mg q6h for mod pain, Dilaudid 0.5mg q6h for sev pain  - NPO except medications  - Hold chemical DVT ppx after midnight, SCDs OK per ortho  - b/l LE doppler negative for DVT    Pre op medical evaluation:   EKG: Line Normal sinus rhythm, Left ventricular hypertrophy with repolarization abnormality ,Cannot rule out Septal infarct , age undetermined.   labs: cbc, BMP Stable, mild elevation of AST/bili, CK elevated due to Rhabdo. will replace K ,  to keep K>4. MAG>2, phos>3   h/o severe AS:  will give gental fluid maintenance to avoid fluid overload.  echo pending, cardio clearance.   - Patient currently has  No signs of ischemic symptoms, no sign of volume overload, no unstable arrhythmias noted. Baseline functional capacity is >4 METS. RCRI score is 0. Patient is currently hemodynamically stable. Patient is medically optimized at this time and understands the inherent risks of surgery.
Pt presenting to ED s/p fall on R hip Thursday, likely mechanical fall, was on the floor for 4 days   - XR Hip w/ Pelvis, Femur: Acute severely comminuted and moderately displaced intertrochanteric fracture of the right femur. No dislocation.  - CT C/A/P: Markedly comminuted and angulated right intertrochanteric femoral fracture. No additional acute traumatic injury identified within the chest, abdomen, or pelvis.  - b/l LE doppler negative for DVT  - s/p R hip ORIF 2/3/25   - Pain control: Tylenol 650mg q6h for mild pain, tramadol 50mg q6h for mod pain, oxycodone   - DVT prophylaxis   - symptomatic anemia: will transfuse one unit PRBC today   - low urine out put , IVF increased

## 2025-02-04 NOTE — PHYSICAL THERAPY INITIAL EVALUATION ADULT - ADDITIONAL COMMENTS
Pt. lives in a private house, 2 HEENA with bilateral rails and 12 steps within the house to the basement. Pt. has a tub shower with doors and grab bars in the bathroom. Pt. reports she was independent and used  no assistive device prior to admission.

## 2025-02-04 NOTE — PROGRESS NOTE ADULT - TIME-BASED
19w0d feeling good, no c/o.  Feeling mvmt.  Had level 2 today, having another boy.  She desires PPTL, so will sign papers at next visit.  AFP today.  RTC 5 weeks for glucola or prn.  elbert  
51
51

## 2025-02-04 NOTE — PATIENT CHOICE NOTE. - NSPTCHOICESTATE_GEN_ALL_CORE

## 2025-02-04 NOTE — PROGRESS NOTE ADULT - PROBLEM SELECTOR PLAN 8
Chronic issue  - Per chart review on All[employer] and [employer], currently on Lexapro 20mg qd  - C/w home medication
Patient unable to recall exact dosing of home meds  - Discussed with patient medications and dosing based on chart from 2023; cross referenced with [employer]  - Lexapro and Pepcid dosing via [employer], will continue for now  - Patient previously on HCTZ 25mg qd, pt does not endorse taking it but unsure - per cardio via [employer] active med

## 2025-02-04 NOTE — DIETITIAN INITIAL EVALUATION ADULT - ADD RECOMMEND
continue current diet order  honor pt's food preferences/tolerances when requested.   Rx MVI daily and vit C 500 mg BID

## 2025-02-04 NOTE — DIETITIAN INITIAL EVALUATION ADULT - PERTINENT MEDS FT
MEDICATIONS  (STANDING):  acetaminophen     Tablet .. 975 milliGRAM(s) Oral every 8 hours  enoxaparin Injectable 40 milliGRAM(s) SubCutaneous every 24 hours  pantoprazole    Tablet 40 milliGRAM(s) Oral before breakfast  potassium chloride  10 mEq/100 mL IVPB 10 milliEquivalent(s) IV Intermittent every 1 hour  sodium chloride 0.9%. 1000 milliLiter(s) (50 mL/Hr) IV Continuous <Continuous>    MEDICATIONS  (PRN):  magnesium hydroxide Suspension 30 milliLiter(s) Oral daily PRN Constipation  ondansetron Injectable 4 milliGRAM(s) IV Push every 6 hours PRN Nausea and/or Vomiting  oxyCODONE    IR 5 milliGRAM(s) Oral every 4 hours PRN Moderate Pain (4 - 6)  oxyCODONE    IR 10 milliGRAM(s) Oral every 4 hours PRN Severe Pain (7 - 10)  polyethylene glycol 3350 17 Gram(s) Oral at bedtime PRN Constipation  senna 2 Tablet(s) Oral at bedtime PRN Constipation  traMADol 50 milliGRAM(s) Oral every 4 hours PRN Mild Pain (1 - 3)

## 2025-02-04 NOTE — OCCUPATIONAL THERAPY INITIAL EVALUATION ADULT - GENERAL OBSERVATIONS, REHAB EVAL
Patient found supine in bed with +IV, +SCD, +bandage right LE and +Courtney catheter. Patient chart reviewed, events noted. Patient cleared to be seen for therapy by RN prior to session.

## 2025-02-04 NOTE — PROGRESS NOTE ADULT - PROBLEM SELECTOR PLAN 9
DVT ppx: SCDs  - 1x heparin dose @9pm      elevated LFT: patient had Bili LAST YEAR, will check D/I bili ratio.
Patient unable to recall exact dosing of home meds  - Discussed with patient medications and dosing based on chart from 2023; cross referenced with [employer]  - Lexapro and Pepcid dosing via [employer], will continue for now  - Patient previously on HCTZ 25mg qd, pt does not endorse taking it but unsure - per cardio via [employer] active med

## 2025-02-04 NOTE — PROGRESS NOTE ADULT - PROBLEM SELECTOR PLAN 10
DVT ppx: SCDs, lovenox       elevated LFT: patient had elevated Bili LAST year,  indirect Bilirubin elevation, improved. Mild AST/ALT elevation post op, will monitor

## 2025-02-04 NOTE — OCCUPATIONAL THERAPY INITIAL EVALUATION ADULT - ADDITIONAL COMMENTS
Patient lives in a one level house with 2 steps with bilateral handrails to enter, 1 flight stairs to basement (laundry room). Patient has a bathtub with sliding doors and 2 grab bars. Patient drives a car and does not own any DME. Patient performed sit to stand and ambulated 5' from bed to chair using RW with mod assist x 2.

## 2025-02-04 NOTE — PROVIDER CONTACT NOTE (OTHER) - ASSESSMENT
pt in bed awake ' dressing dry and intact. Courtney check and patent
pt sitting in chair asymptomatic, dressing dry and intact and taking fluids

## 2025-02-04 NOTE — DIETITIAN INITIAL EVALUATION ADULT - PERTINENT LABORATORY DATA
02-04    135  |  100  |  24[H]  ----------------------------<  167[H]  3.9   |  27  |  0.67    Ca    8.1[L]      04 Feb 2025 09:06  Phos  3.0     02-04  Mg     2.4     02-04    TPro  5.3[L]  /  Alb  2.2[L]  /  TBili  1.6[H]  /  DBili  0.4[H]  /  AST  83[H]  /  ALT  83[H]  /  AlkPhos  78  02-04

## 2025-02-04 NOTE — SOCIAL WORK PROGRESS NOTE - NSSWPROGRESSNOTE_GEN_ALL_CORE
SWI reviewed and completed dispo consult. PT=ROSANNA. Pt understands and is in agreement with a d/c plan to rehab. Pt states she is interested in [facility] and [facility], put states she does not know which is her first choice yet. SWI will start referral, and will f/u with pt about which is her first choice, and pt will also provide more choices. Pt needs 3-night stay, possible d/c tomorrow pending MD clearance. TATYANA requested. SWI to follow and remain available for any needs. SWI reviewed and completed dispo consult. PT=ROSANNA. Pt understands and is in agreement with a d/c plan to rehab. Pt states she is interested in [facility] and [facility], but does not know which is her first choice yet. SWI will start referral, and will f/u with pt about which is her first choice, and pt will also provide more choices. Pt needs 3-night stay, possible d/c tomorrow pending MD clearance. TATYANA requested. SWI to follow and remain available for any needs.

## 2025-02-05 ENCOUNTER — TRANSCRIPTION ENCOUNTER (OUTPATIENT)
Age: 80
End: 2025-02-05

## 2025-02-05 VITALS
OXYGEN SATURATION: 96 % | TEMPERATURE: 98 F | SYSTOLIC BLOOD PRESSURE: 111 MMHG | RESPIRATION RATE: 19 BRPM | DIASTOLIC BLOOD PRESSURE: 73 MMHG | HEART RATE: 94 BPM

## 2025-02-05 LAB
ALBUMIN SERPL ELPH-MCNC: 1.9 G/DL — LOW (ref 3.3–5)
ALP SERPL-CCNC: 69 U/L — SIGNIFICANT CHANGE UP (ref 40–120)
ALT FLD-CCNC: 49 U/L — SIGNIFICANT CHANGE UP (ref 12–78)
ANION GAP SERPL CALC-SCNC: 6 MMOL/L — SIGNIFICANT CHANGE UP (ref 5–17)
AST SERPL-CCNC: 53 U/L — HIGH (ref 15–37)
BASOPHILS # BLD AUTO: 0.02 K/UL — SIGNIFICANT CHANGE UP (ref 0–0.2)
BASOPHILS NFR BLD AUTO: 0.2 % — SIGNIFICANT CHANGE UP (ref 0–2)
BILIRUB SERPL-MCNC: 1.6 MG/DL — HIGH (ref 0.2–1.2)
BUN SERPL-MCNC: 16 MG/DL — SIGNIFICANT CHANGE UP (ref 7–23)
CALCIUM SERPL-MCNC: 7.8 MG/DL — LOW (ref 8.5–10.1)
CHLORIDE SERPL-SCNC: 105 MMOL/L — SIGNIFICANT CHANGE UP (ref 96–108)
CK SERPL-CCNC: 967 U/L — HIGH (ref 26–192)
CO2 SERPL-SCNC: 28 MMOL/L — SIGNIFICANT CHANGE UP (ref 22–31)
CREAT SERPL-MCNC: 0.47 MG/DL — LOW (ref 0.5–1.3)
EGFR: 97 ML/MIN/1.73M2 — SIGNIFICANT CHANGE UP
EOSINOPHIL # BLD AUTO: 0.32 K/UL — SIGNIFICANT CHANGE UP (ref 0–0.5)
EOSINOPHIL NFR BLD AUTO: 3.3 % — SIGNIFICANT CHANGE UP (ref 0–6)
GLUCOSE SERPL-MCNC: 104 MG/DL — HIGH (ref 70–99)
HCT VFR BLD CALC: 25.7 % — LOW (ref 34.5–45)
HGB BLD-MCNC: 9 G/DL — LOW (ref 11.5–15.5)
IMM GRANULOCYTES NFR BLD AUTO: 0.8 % — SIGNIFICANT CHANGE UP (ref 0–0.9)
LYMPHOCYTES # BLD AUTO: 1.63 K/UL — SIGNIFICANT CHANGE UP (ref 1–3.3)
LYMPHOCYTES # BLD AUTO: 16.7 % — SIGNIFICANT CHANGE UP (ref 13–44)
MCHC RBC-ENTMCNC: 28.8 PG — SIGNIFICANT CHANGE UP (ref 27–34)
MCHC RBC-ENTMCNC: 35 G/DL — SIGNIFICANT CHANGE UP (ref 32–36)
MCV RBC AUTO: 82.4 FL — SIGNIFICANT CHANGE UP (ref 80–100)
MONOCYTES # BLD AUTO: 0.89 K/UL — SIGNIFICANT CHANGE UP (ref 0–0.9)
MONOCYTES NFR BLD AUTO: 9.1 % — SIGNIFICANT CHANGE UP (ref 2–14)
NEUTROPHILS # BLD AUTO: 6.81 K/UL — SIGNIFICANT CHANGE UP (ref 1.8–7.4)
NEUTROPHILS NFR BLD AUTO: 69.9 % — SIGNIFICANT CHANGE UP (ref 43–77)
NRBC # BLD: 0 /100 WBCS — SIGNIFICANT CHANGE UP (ref 0–0)
NRBC BLD-RTO: 0 /100 WBCS — SIGNIFICANT CHANGE UP (ref 0–0)
PLATELET # BLD AUTO: 142 K/UL — LOW (ref 150–400)
POTASSIUM SERPL-MCNC: 3.6 MMOL/L — SIGNIFICANT CHANGE UP (ref 3.5–5.3)
POTASSIUM SERPL-SCNC: 3.6 MMOL/L — SIGNIFICANT CHANGE UP (ref 3.5–5.3)
PROT SERPL-MCNC: 4.8 G/DL — LOW (ref 6–8.3)
RBC # BLD: 3.12 M/UL — LOW (ref 3.8–5.2)
RBC # FLD: 13.4 % — SIGNIFICANT CHANGE UP (ref 10.3–14.5)
SODIUM SERPL-SCNC: 139 MMOL/L — SIGNIFICANT CHANGE UP (ref 135–145)
WBC # BLD: 9.75 K/UL — SIGNIFICANT CHANGE UP (ref 3.8–10.5)
WBC # FLD AUTO: 9.75 K/UL — SIGNIFICANT CHANGE UP (ref 3.8–10.5)

## 2025-02-05 PROCEDURE — 99285 EMERGENCY DEPT VISIT HI MDM: CPT | Mod: 25

## 2025-02-05 PROCEDURE — 86900 BLOOD TYPING SEROLOGIC ABO: CPT

## 2025-02-05 PROCEDURE — 81001 URINALYSIS AUTO W/SCOPE: CPT

## 2025-02-05 PROCEDURE — 73502 X-RAY EXAM HIP UNI 2-3 VIEWS: CPT

## 2025-02-05 PROCEDURE — 85610 PROTHROMBIN TIME: CPT

## 2025-02-05 PROCEDURE — 71045 X-RAY EXAM CHEST 1 VIEW: CPT

## 2025-02-05 PROCEDURE — 82550 ASSAY OF CK (CPK): CPT

## 2025-02-05 PROCEDURE — 82962 GLUCOSE BLOOD TEST: CPT

## 2025-02-05 PROCEDURE — 96374 THER/PROPH/DIAG INJ IV PUSH: CPT

## 2025-02-05 PROCEDURE — 83735 ASSAY OF MAGNESIUM: CPT

## 2025-02-05 PROCEDURE — 72125 CT NECK SPINE W/O DYE: CPT | Mod: MC

## 2025-02-05 PROCEDURE — 97162 PT EVAL MOD COMPLEX 30 MIN: CPT

## 2025-02-05 PROCEDURE — 73552 X-RAY EXAM OF FEMUR 2/>: CPT

## 2025-02-05 PROCEDURE — P9016: CPT

## 2025-02-05 PROCEDURE — 83036 HEMOGLOBIN GLYCOSYLATED A1C: CPT

## 2025-02-05 PROCEDURE — 93306 TTE W/DOPPLER COMPLETE: CPT

## 2025-02-05 PROCEDURE — 80053 COMPREHEN METABOLIC PANEL: CPT

## 2025-02-05 PROCEDURE — 74176 CT ABD & PELVIS W/O CONTRAST: CPT | Mod: MC

## 2025-02-05 PROCEDURE — 86850 RBC ANTIBODY SCREEN: CPT

## 2025-02-05 PROCEDURE — 71250 CT THORAX DX C-: CPT | Mod: MC

## 2025-02-05 PROCEDURE — 99232 SBSQ HOSP IP/OBS MODERATE 35: CPT

## 2025-02-05 PROCEDURE — 93970 EXTREMITY STUDY: CPT

## 2025-02-05 PROCEDURE — 85027 COMPLETE CBC AUTOMATED: CPT

## 2025-02-05 PROCEDURE — 80048 BASIC METABOLIC PNL TOTAL CA: CPT

## 2025-02-05 PROCEDURE — 86923 COMPATIBILITY TEST ELECTRIC: CPT

## 2025-02-05 PROCEDURE — C1769: CPT

## 2025-02-05 PROCEDURE — 93005 ELECTROCARDIOGRAM TRACING: CPT

## 2025-02-05 PROCEDURE — 85730 THROMBOPLASTIN TIME PARTIAL: CPT

## 2025-02-05 PROCEDURE — 76000 FLUOROSCOPY <1 HR PHYS/QHP: CPT

## 2025-02-05 PROCEDURE — 80061 LIPID PANEL: CPT

## 2025-02-05 PROCEDURE — 36415 COLL VENOUS BLD VENIPUNCTURE: CPT

## 2025-02-05 PROCEDURE — 82248 BILIRUBIN DIRECT: CPT

## 2025-02-05 PROCEDURE — 84100 ASSAY OF PHOSPHORUS: CPT

## 2025-02-05 PROCEDURE — 86901 BLOOD TYPING SEROLOGIC RH(D): CPT

## 2025-02-05 PROCEDURE — 97165 OT EVAL LOW COMPLEX 30 MIN: CPT

## 2025-02-05 PROCEDURE — 36430 TRANSFUSION BLD/BLD COMPNT: CPT

## 2025-02-05 PROCEDURE — C1713: CPT

## 2025-02-05 PROCEDURE — 70450 CT HEAD/BRAIN W/O DYE: CPT | Mod: MC

## 2025-02-05 PROCEDURE — C1889: CPT

## 2025-02-05 PROCEDURE — 85025 COMPLETE CBC W/AUTO DIFF WBC: CPT

## 2025-02-05 PROCEDURE — 99239 HOSP IP/OBS DSCHRG MGMT >30: CPT

## 2025-02-05 RX ORDER — POLYETHYLENE GLYCOL 3350 17 G/17G
17 POWDER, FOR SOLUTION ORAL
Qty: 0 | Refills: 0 | DISCHARGE
Start: 2025-02-05

## 2025-02-05 RX ORDER — ASCORBIC ACID 500 MG/ML
1 VIAL (ML) INJECTION
Qty: 0 | Refills: 0 | DISCHARGE
Start: 2025-02-05

## 2025-02-05 RX ORDER — OXYCODONE HYDROCHLORIDE 30 MG/1
1 TABLET ORAL
Qty: 0 | Refills: 0 | DISCHARGE
Start: 2025-02-05

## 2025-02-05 RX ORDER — ENOXAPARIN SODIUM 100 MG/ML
40 INJECTION SUBCUTANEOUS
Qty: 0 | Refills: 0 | DISCHARGE
Start: 2025-02-05

## 2025-02-05 RX ORDER — TRAMADOL HYDROCHLORIDE 100 MG/1
1 TABLET, EXTENDED RELEASE ORAL
Qty: 0 | Refills: 0 | DISCHARGE
Start: 2025-02-05

## 2025-02-05 RX ORDER — MAGNESIUM HYDROXIDE 400 MG/5ML
30 SUSPENSION, ORAL (FINAL DOSE FORM) ORAL
Qty: 0 | Refills: 0 | DISCHARGE
Start: 2025-02-05

## 2025-02-05 RX ORDER — POTASSIUM CHLORIDE 750 MG/1
20 TABLET, EXTENDED RELEASE ORAL ONCE
Refills: 0 | Status: COMPLETED | OUTPATIENT
Start: 2025-02-05 | End: 2025-02-05

## 2025-02-05 RX ORDER — ACETAMINOPHEN 160 MG/5ML
3 SUSPENSION ORAL
Qty: 0 | Refills: 0 | DISCHARGE
Start: 2025-02-05

## 2025-02-05 RX ORDER — SENNOSIDES 8.6 MG
2 TABLET ORAL
Qty: 0 | Refills: 0 | DISCHARGE
Start: 2025-02-05

## 2025-02-05 RX ADMIN — ACETAMINOPHEN 975 MILLIGRAM(S): 160 SUSPENSION ORAL at 06:58

## 2025-02-05 RX ADMIN — ACETAMINOPHEN 975 MILLIGRAM(S): 160 SUSPENSION ORAL at 05:33

## 2025-02-05 RX ADMIN — ENOXAPARIN SODIUM 40 MILLIGRAM(S): 100 INJECTION SUBCUTANEOUS at 11:11

## 2025-02-05 RX ADMIN — PANTOPRAZOLE 40 MILLIGRAM(S): 20 TABLET, DELAYED RELEASE ORAL at 05:34

## 2025-02-05 RX ADMIN — OXYCODONE HYDROCHLORIDE 10 MILLIGRAM(S): 30 TABLET ORAL at 05:33

## 2025-02-05 RX ADMIN — OXYCODONE HYDROCHLORIDE 10 MILLIGRAM(S): 30 TABLET ORAL at 09:50

## 2025-02-05 RX ADMIN — OXYCODONE HYDROCHLORIDE 10 MILLIGRAM(S): 30 TABLET ORAL at 10:45

## 2025-02-05 RX ADMIN — POTASSIUM CHLORIDE 20 MILLIEQUIVALENT(S): 750 TABLET, EXTENDED RELEASE ORAL at 09:52

## 2025-02-05 RX ADMIN — Medication 75 MILLILITER(S): at 08:14

## 2025-02-05 NOTE — SOCIAL WORK PROGRESS NOTE - NSSWPROGRESSNOTE_GEN_ALL_CORE
Per MD, pt stable for d/c to [facility] today. SW met with pt at bedside to discuss d/c planning. Pt is accepting bed at [facility] for ROSANNA today. Robert via [facility] scheduled for 11AM. SW to follow and remain available for any needs.

## 2025-02-05 NOTE — DISCHARGE NOTE NURSING/CASE MANAGEMENT/SOCIAL WORK - NSDCPEFALRISK_GEN_ALL_CORE
For information on Fall & Injury Prevention, visit: [email or web address] OR  [email or web address] OR  https://www.cdc.gov/steadi/patient.html

## 2025-02-05 NOTE — PROGRESS NOTE ADULT - REASON FOR ADMISSION
R Hip Fracture

## 2025-02-05 NOTE — CAREGIVER ENGAGEMENT NOTE - CAREGIVER OUTREACH NOTES - FREE TEXT
SW received call from son Luiz regarding d/c planning. SW confirmed that pt to be picked up today at 11AM today. He expressed understanding. SW to follow and remain available for any needs.

## 2025-02-05 NOTE — PROGRESS NOTE ADULT - PROVIDER SPECIALTY LIST ADULT
Cardiology
Nephrology
Orthopedics
Cardiology
Hospitalist
Hospitalist

## 2025-02-05 NOTE — PROGRESS NOTE ADULT - SUBJECTIVE AND OBJECTIVE BOX
[facility]                                                       Dr. Hurtado, Dr. Camacho, Dr. Reich, Dr. Angel, Dr. Montanez, [facility]. [facility], [facility], [address]                                                 [address] [address]                                      Ph: [phone]  Fax: [phone]                                         Ph: [phone]  Fax: [phone]      Patient is a 79y old  Female who presents with a chief complaint of R Hip Fracture (04 Feb 2025 10:27)  Patient seen in follow up for rhabdomyolysis.        PAST MEDICAL HISTORY:  AS (aortic stenosis)    HTN (hypertension)    HLD (hyperlipidemia)    Dry eyes    Colon cancer    Acid reflux    OP (osteoporosis)    AV block    Syncope    Carotid artery disease          MEDICATIONS  (STANDING):  acetaminophen     Tablet .. 975 milliGRAM(s) Oral every 8 hours  enoxaparin Injectable 40 milliGRAM(s) SubCutaneous every 24 hours  pantoprazole    Tablet 40 milliGRAM(s) Oral before breakfast  potassium chloride  10 mEq/100 mL IVPB 10 milliEquivalent(s) IV Intermittent every 1 hour  sodium chloride 0.9%. 1000 milliLiter(s) (75 mL/Hr) IV Continuous <Continuous>    MEDICATIONS  (PRN):  magnesium hydroxide Suspension 30 milliLiter(s) Oral daily PRN Constipation  ondansetron Injectable 4 milliGRAM(s) IV Push every 6 hours PRN Nausea and/or Vomiting  oxyCODONE    IR 5 milliGRAM(s) Oral every 4 hours PRN Moderate Pain (4 - 6)  oxyCODONE    IR 10 milliGRAM(s) Oral every 4 hours PRN Severe Pain (7 - 10)  polyethylene glycol 3350 17 Gram(s) Oral at bedtime PRN Constipation  senna 2 Tablet(s) Oral at bedtime PRN Constipation  traMADol 50 milliGRAM(s) Oral every 4 hours PRN Mild Pain (1 - 3)    T(C): 36.7 (02-05-25 @ 09:44), Max: 37.2 (02-05-25 @ 00:33)  HR: 82 (02-05-25 @ 09:44) (65 - 88)  BP: 108/65 (02-05-25 @ 09:44) (90/53 - 154/66)  RR: 19 (02-05-25 @ 09:44)  SpO2: 96% (02-05-25 @ 09:44)  Wt(kg): --  I&O's Detail    04 Feb 2025 07:01  -  05 Feb 2025 07:00  --------------------------------------------------------  IN:    IV PiggyBack: 100 mL    Oral Fluid: 1220 mL    PRBCs (Packed Red Blood Cells): 328 mL    sodium chloride 0.9%: 300 mL    sodium chloride 0.9%: 375 mL    Sodium Chloride 0.9% Bolus: 500 mL  Total IN: 2823 mL    OUT:    Indwelling Catheter - Urethral (mL): 850 mL  Total OUT: 850 mL    Total NET: 1973 mL            PHYSICAL EXAM:  General: No distress  Respiratory: b/l air entry  Cardiovascular: S1 S2  Gastrointestinal: soft  Extremities:  edema                          LABORATORY:                        9.0    9.75  )-----------( 142      ( 05 Feb 2025 06:15 )             25.7     02-05    139  |  105  |  16  ----------------------------<  104[H]  3.6   |  28  |  0.47[L]    Ca    7.8[L]      05 Feb 2025 06:15  Phos  3.0     02-04  Mg     2.4     02-04    TPro  4.8[L]  /  Alb  1.9[L]  /  TBili  1.6[H]  /  DBili  x   /  AST  53[H]  /  ALT  49  /  AlkPhos  69  02-05    Sodium: 139 mmol/L (02-05 @ 06:15)  Sodium: 135 mmol/L (02-04 @ 09:06)  Sodium: 137 mmol/L (02-03 @ 20:25)    Potassium: 3.6 mmol/L (02-05 @ 06:15)  Potassium: 3.9 mmol/L (02-04 @ 09:06)  Potassium: 4.1 mmol/L (02-03 @ 20:25)    Hemoglobin: 9.0 g/dL (02-05 @ 06:15)  Hemoglobin: 9.7 g/dL (02-04 @ 09:06)  Hemoglobin: 10.4 g/dL (02-03 @ 20:25)  Hemoglobin: 11.5 g/dL (02-03 @ 07:37)    Creatinine, Serum 0.47 (02-05 @ 06:15)  Creatinine, Serum 0.67 (02-04 @ 09:06)  Creatinine, Serum 0.82 (02-03 @ 20:25)  Creatinine, Serum 0.57 (02-03 @ 07:37)        LIVER FUNCTIONS - ( 05 Feb 2025 06:15 )  Alb: 1.9 g/dL / Pro: 4.8 g/dL / ALK PHOS: 69 U/L / ALT: 49 U/L / AST: 53 U/L / GGT: x           Urinalysis Basic - ( 05 Feb 2025 06:15 )    Color: x / Appearance: x / SG: x / pH: x  Gluc: 104 mg/dL / Ketone: x  / Bili: x / Urobili: x   Blood: x / Protein: x / Nitrite: x   Leuk Esterase: x / RBC: x / WBC x   Sq Epi: x / Non Sq Epi: x / Bacteria: x      
Patient seen and examined at bedside. Pain well controlled with medication. Patient denies any numbness, tingling, weakness, or any other orthopaedic complaint. Denies N/V/CP/SOB.    LABS:                        10.4   17.15 )-----------( 169      ( 03 Feb 2025 20:25 )             30.8     02-03    137  |  103  |  31[H]  ----------------------------<  237[H]  4.1   |  29  |  0.82    Ca    8.4[L]      03 Feb 2025 20:25  Phos  2.4     02-03  Mg     2.7     02-03    TPro  5.6[L]  /  Alb  2.7[L]  /  TBili  2.5[H]  /  DBili  x   /  AST  57[H]  /  ALT  43  /  AlkPhos  72  02-03    PT/INR - ( 03 Feb 2025 05:15 )   PT: 11.9 sec;   INR: 1.01 ratio         PTT - ( 03 Feb 2025 05:15 )  PTT:27.4 sec  Urinalysis Basic - ( 03 Feb 2025 20:25 )    Color: x / Appearance: x / SG: x / pH: x  Gluc: 237 mg/dL / Ketone: x  / Bili: x / Urobili: x   Blood: x / Protein: x / Nitrite: x   Leuk Esterase: x / RBC: x / WBC x   Sq Epi: x / Non Sq Epi: x / Bacteria: x        VITAL SIGNS:  T(C): 36.7 (02-04-25 @ 04:45), Max: 36.9 (02-03-25 @ 12:37)  HR: 67 (02-04-25 @ 04:45) (65 - 82)  BP: 112/71 (02-04-25 @ 04:45) (90/53 - 115/54)  RR: 18 (02-04-25 @ 04:45) (14 - 20)  SpO2: 95% (02-04-25 @ 04:45) (92% - 98%)      General: NAD, resting comfortably in bed  RLE:   Dressing in place C/D/I  SCD in place bilaterally  No calf tenderness   Motor: + EHL/FHL/TA/GSC  Sensory: SILT SPN/DPN/Juanito/Saph/Tib  DP/PT 2+      A/P:  79y f s/p Right Hip Long IMN POD 1    -PT/OT  -WBAT on the RLE  -Pain Control  -DVT ppx: per primary, start on POD1 (recommend at least 30 days of DVT ppx)  -Continue perioperative abx x 24 hours  -FU AM Labs  -Rest, ice, compress, and elevate the extremity as tolerated  -Incentive Spirometry  -Medical management appreciated  -Dispo per PT    -D/w Dr. Hedrick, will update plan as needed
Pt seen at bedside this AM. No acute complaints, pain is well managed. Denies numbness, tingling, fevers, chills, CP, SOB, N/V/D.    Vital Signs (24 Hrs):  T(C): 37.1 (02-05-25 @ 04:46), Max: 37.2 (02-05-25 @ 00:33)  HR: 79 (02-05-25 @ 04:46) (72 - 88)  BP: 115/69 (02-05-25 @ 04:46) (93/57 - 154/66)  RR: 18 (02-05-25 @ 04:46) (17 - 19)  SpO2: 93% (02-05-25 @ 04:46) (93% - 97%)  Wt(kg): --    LABS:                          9.0    9.75  )-----------( 142      ( 05 Feb 2025 06:15 )             25.7     02-05    139  |  105  |  16  ----------------------------<  104[H]  3.6   |  28  |  0.47[L]    Ca    7.8[L]      05 Feb 2025 06:15  Phos  3.0     02-04  Mg     2.4     02-04    TPro  4.8[L]  /  Alb  1.9[L]  /  TBili  1.6[H]  /  DBili  x   /  AST  53[H]  /  ALT  49  /  AlkPhos  69  02-05    LIVER FUNCTIONS - ( 05 Feb 2025 06:15 )  Alb: 1.9 g/dL / Pro: 4.8 g/dL / ALK PHOS: 69 U/L / ALT: 49 U/L / AST: 53 U/L / GGT: x             General: NAD, resting comfortably in bed  RLE:   Dressing in place C/D/I  SCD in place bilaterally  No calf tenderness   Motor: + EHL/FHL/TA/GSC  Sensory: SILT SPN/DPN/Juanito/Saph/Tib  DP/PT 2+      A/P:  79y f s/p Right Hip Long IMN POD2    -PT/OT  -WBAT on the RLE  -Pain Control  -DVT ppx: per primary  -FU AM Labs  -Rest, ice, compress, and elevate the extremity as tolerated  -Incentive Spirometry  -Medical management appreciated  -ROSANNA per PT    -D/w Dr. Hedrick, will update plan as needed  
Renal consult called for rhabdomyolysis. Chart reviewed. IV hydration.  Monitor CPK trend. Full consult to follow. 
                                                                              [facility]                                                       Dr. Hurtado, Dr. Camacho, Dr. Reich, Dr. Angel, Dr. Montanez, [facility], [facility], [address]                                                 [address] [address]                                      Ph: [phone]  Fax: [phone]                                         Ph: [phone]  Fax: [phone]      Patient is a 79y old  Female who presents with a chief complaint of R Hip Fracture (04 Feb 2025 10:27)    Patient seen in follow up for rhabdomyolysis.        PAST MEDICAL HISTORY:  AS (aortic stenosis)    HTN (hypertension)    HLD (hyperlipidemia)    Dry eyes    Colon cancer    Acid reflux    OP (osteoporosis)    AV block    Syncope    Carotid artery disease    No pertinent past medical history      MEDICATIONS  (STANDING):  acetaminophen     Tablet .. 975 milliGRAM(s) Oral every 8 hours  enoxaparin Injectable 40 milliGRAM(s) SubCutaneous every 24 hours  pantoprazole    Tablet 40 milliGRAM(s) Oral before breakfast  potassium chloride  10 mEq/100 mL IVPB 10 milliEquivalent(s) IV Intermittent every 1 hour  sodium chloride 0.9% Bolus 500 milliLiter(s) IV Bolus once  sodium chloride 0.9%. 1000 milliLiter(s) (75 mL/Hr) IV Continuous <Continuous>    MEDICATIONS  (PRN):  magnesium hydroxide Suspension 30 milliLiter(s) Oral daily PRN Constipation  ondansetron Injectable 4 milliGRAM(s) IV Push every 6 hours PRN Nausea and/or Vomiting  oxyCODONE    IR 5 milliGRAM(s) Oral every 4 hours PRN Moderate Pain (4 - 6)  oxyCODONE    IR 10 milliGRAM(s) Oral every 4 hours PRN Severe Pain (7 - 10)  polyethylene glycol 3350 17 Gram(s) Oral at bedtime PRN Constipation  senna 2 Tablet(s) Oral at bedtime PRN Constipation  traMADol 50 milliGRAM(s) Oral every 4 hours PRN Mild Pain (1 - 3)    T(C): 36.9 (02-04-25 @ 13:45), Max: 36.9 (02-03-25 @ 04:46)  HR: 87 (02-04-25 @ 13:45) (65 - 87)  BP: 95/58 (02-04-25 @ 13:45) (90/53 - 154/66)  RR: 19 (02-04-25 @ 13:45) (14 - 20)  SpO2: 97% (02-04-25 @ 13:45) (92% - 98%)  Wt(kg): --  I&O's Detail    03 Feb 2025 07:01  -  04 Feb 2025 07:00  --------------------------------------------------------  IN:    Oral Fluid: 240 mL  Total IN: 240 mL    OUT:    Indwelling Catheter - Urethral (mL): 300 mL  Total OUT: 300 mL    Total NET: -60 mL      04 Feb 2025 07:01  -  04 Feb 2025 14:30  --------------------------------------------------------  IN:    IV PiggyBack: 100 mL  Total IN: 100 mL    OUT:    Indwelling Catheter - Urethral (mL): 200 mL  Total OUT: 200 mL    Total NET: -100 mL          PHYSICAL EXAM:  General: No distress  Respiratory: b/l air entry  Cardiovascular: S1 S2  Gastrointestinal: soft  Extremities:  edema                              9.7    13.03 )-----------( 167      ( 04 Feb 2025 09:06 )             27.9     02-04    135  |  100  |  24[H]  ----------------------------<  167[H]  3.9   |  27  |  0.67    Ca    8.1[L]      04 Feb 2025 09:06  Phos  3.0     02-04  Mg     2.4     02-04    TPro  5.3[L]  /  Alb  2.2[L]  /  TBili  1.6[H]  /  DBili  0.4[H]  /  AST  83[H]  /  ALT  83[H]  /  AlkPhos  78  02-04        LIVER FUNCTIONS - ( 04 Feb 2025 09:06 )  Alb: 2.2 g/dL / Pro: 5.3 g/dL / ALK PHOS: 78 U/L / ALT: 83 U/L / AST: 83 U/L / GGT: x           Urinalysis Basic - ( 04 Feb 2025 09:06 )    Color: x / Appearance: x / SG: x / pH: x  Gluc: 167 mg/dL / Ketone: x  / Bili: x / Urobili: x   Blood: x / Protein: x / Nitrite: x   Leuk Esterase: x / RBC: x / WBC x   Sq Epi: x / Non Sq Epi: x / Bacteria: x        Sodium, Serum: 135 (02-04 @ 09:06)  Sodium, Serum: 137 (02-03 @ 20:25)  Sodium, Serum: 138 (02-03 @ 07:37)  Sodium, Serum: 137 (02-03 @ 05:15)    Creatinine, Serum: 0.67 (02-04 @ 09:06)  Creatinine, Serum: 0.82 (02-03 @ 20:25)  Creatinine, Serum: 0.57 (02-03 @ 07:37)  Creatinine, Serum: 0.56 (02-03 @ 05:15)  Creatinine, Serum: 0.72 (02-02 @ 13:29)    Potassium, Serum: 3.9 (02-04 @ 09:06)  Potassium, Serum: 4.1 (02-03 @ 20:25)  Potassium, Serum: 3.4 (02-03 @ 07:37)  Potassium, Serum: 3.6 (02-03 @ 05:15)    Hemoglobin: 9.7 (02-04 @ 09:06)  Hemoglobin: 10.4 (02-03 @ 20:25)  Hemoglobin: 11.5 (02-03 @ 07:37)  Hemoglobin: 11.3 (02-03 @ 05:15)        
[facility] -- Aubrey Villalobos Pannella, Patel, Savella Goodger, Cohen  Office # [phone]      Follow Up:  Cardiac optimization     Subjective/Observations:   .  No complaints of chest pain, dyspnea, or palpitations reported. No signs of orthopnea or PND.  on room air   + right hip pain     REVIEW OF SYSTEMS: All other review of systems is negative unless indicated above    PAST MEDICAL & SURGICAL HISTORY:  AS (aortic stenosis)  moderate ( ECHO )  "asymptomatic"      HTN (hypertension)      HLD (hyperlipidemia)      Dry eyes      Colon cancer  adenocarcinoma      Acid reflux      OP (osteoporosis)      AV block      Syncope  last       Carotid artery disease  on follow up      H/O tubal ligation      S/P colon polypectomy          MEDICATIONS  (STANDING):  acetaminophen     Tablet .. 975 milliGRAM(s) Oral every 8 hours  enoxaparin Injectable 40 milliGRAM(s) SubCutaneous every 24 hours  pantoprazole    Tablet 40 milliGRAM(s) Oral before breakfast  potassium chloride    Tablet ER 20 milliEquivalent(s) Oral once  potassium chloride  10 mEq/100 mL IVPB 10 milliEquivalent(s) IV Intermittent every 1 hour  sodium chloride 0.9%. 1000 milliLiter(s) (75 mL/Hr) IV Continuous <Continuous>    MEDICATIONS  (PRN):  magnesium hydroxide Suspension 30 milliLiter(s) Oral daily PRN Constipation  ondansetron Injectable 4 milliGRAM(s) IV Push every 6 hours PRN Nausea and/or Vomiting  oxyCODONE    IR 5 milliGRAM(s) Oral every 4 hours PRN Moderate Pain (4 - 6)  oxyCODONE    IR 10 milliGRAM(s) Oral every 4 hours PRN Severe Pain (7 - 10)  polyethylene glycol 3350 17 Gram(s) Oral at bedtime PRN Constipation  senna 2 Tablet(s) Oral at bedtime PRN Constipation  traMADol 50 milliGRAM(s) Oral every 4 hours PRN Mild Pain (1 - 3)      Allergies    Demerol HCl (Hives)  Demerol HCl (Vomiting)    Intolerances        Vital Signs Last 24 Hrs  T(C): 36.7 (2025 09:44), Max: 37.2 (2025 00:33)  T(F): 98 (2025 09:44), Max: 98.9 (2025 00:33)  HR: 82 (2025 09:44) (72 - 88)  BP: 108/65 (2025 09:44) (93/57 - 154/66)  BP(mean): --  RR: 19 (2025 09:44) (17 - 19)  SpO2: 96% (:44) (93% - 97%)    Parameters below as of 2025 09:44  Patient On (Oxygen Delivery Method): room air        I&O's Summary    2025 07:01  -  2025 07:00  --------------------------------------------------------  IN: 2823 mL / OUT: 850 mL / NET: 1973 mL          PHYSICAL EXAM:  TELE: not on tele   Constitutional: NAD, awake and alert, well-developed  HEENT: Moist Mucous Membranes, Anicteric  Pulmonary: Non-labored, breath sounds are clear bilaterally, No wheezing, crackles or rhonchi  Cardiovascular: Regular, S1 and S2 nl, murmur   Gastrointestinal: Bowel Sounds present, soft, nontender.   Lymph: No lymphadenopathy. No peripheral edema.  Skin: right hip dressing cdi  Psych:  Mood & affect appropriate    LABS: All Labs Reviewed:                        9.0    9.75  )-----------( 142      ( 2025 06:15 )             25.7                         9.7    13.03 )-----------( 167      ( 2025 09:06 )             27.9                         10.4   17.15 )-----------( 169      ( 2025 20:25 )             30.8     2025 06:15    139    |  105    |  16     ----------------------------<  104    3.6     |  28     |  0.47   2025 09:06    135    |  100    |  24     ----------------------------<  167    3.9     |  27     |  0.67   2025 20:25    137    |  103    |  31     ----------------------------<  237    4.1     |  29     |  0.82     Ca    7.8        2025 06:15  Ca    8.1        2025 09:06  Ca    8.4        2025 20:25  Phos  3.0       2025 09:06  Phos  2.4       2025 07:37  Mg     2.4       2025 09:06  Mg     2.7       2025 07:37  Mg     2.7       2025 13:29    TPro  4.8    /  Alb  1.9    /  TBili  1.6    /  DBili  x      /  AST  53     /  ALT  49     /  AlkPhos  69     2025 06:15  TPro  5.3    /  Alb  2.2    /  TBili  1.6    /  DBili  0.4    /  AST  83     /  ALT  83     /  AlkPhos  78     2025 09:06  TPro  5.6    /  Alb  2.7    /  TBili  2.5    /  DBili  x      /  AST  57     /  ALT  43     /  AlkPhos  72     2025 07:37             EC Lead ECG:   Ventricular Rate 69 BPM    Atrial Rate 69 BPM    P-R Interval 190 ms    QRS Duration 90 ms    Q-T Interval 442 ms    QTC Calculation(Bazett) 473 ms    P Axis 47 degrees    R Axis 12 degrees    T Axis 155 degrees    Diagnosis Line Normal sinus rhythm  Left ventricular hypertrophy with repolarization abnormality ( R in aVL , Sokolow-Ogden , [place] product , Romhilt-Venegas )  Cannot rule out Septal infarct , age undetermined  Abnormal ECG  No previous ECGs available  Confirmed by Belinda Bauman ([id number]) on 2025 2:44:23 PM (25 @ 13:11)      TRANSTHORACIC ECHOCARDIOGRAM REPORT  ________________________________________________________________________________                                      _______       Pt. Name:       SAMANTHA RAIN Study Date:    2/3/2025  MRN:            [id number] YOB: 1945  Accession #:    [id number]   Age:           79 years  Account#:       [id number]  Gender:        F  Heart Rate:                 Height:        62.99 in (160.00 cm)  Rhythm:                     Weight:        149.91 lb (68.00kg)  Blood Pressure: 112/68 mmHg BSA/BMI:       1.71 m² / 26.56 kg/m²  ________________________________________________________________________________________  Referring Physician:    [id number] Kvng Baez  Interpreting Physician: Byron Ortiz M.D.  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  Ordering Location: [facility]  Admission Status:  Inpatient  Study Information: Image quality for this study is technically difficult.    _______________________________________________________________________________________     CONCLUSIONS:      1. Technically difficult image quality.   2. Left ventricular systolic function is normal with an ejection fraction of 58 % by Rodriguez's method of disks.   3. Left atrium is mildly dilated.   4. The peak transaortic velocity is 5.57 m/s, peak transaortic gradient is 124.1 mmHg and mean transaortic gradient is 73.0 mmHg with an LVOT/aortic valve VTI ratio of 0.28. The effective orifice area is estimated at 0.81 cm² by the continuity equation.   5. There is severe calcification of the aortic valve leaflets.   6. There is mild (grade 1) left ventricular diastolic dysfunction.   7. Normal right ventricular systolic function.   8. Thickened pericardium.    ________________________________________________________________________________________  FINDINGS:     Left Ventricle:  Left ventricular systolic function is normal with a calculated ejection fraction of 58 % by the Rodriguez's biplane method of disks. Severe left ventricular hypertrophy. There is mild(grade 1) left ventricular diastolic dysfunction.     Right Ventricle:  The right ventricular cavity is normal in size and right ventricular systolic function is normal.     Left Atrium:  The left atrium is mildly dilated with an indexed volume of 41.74 ml/m².     Right Atrium:  The right atrium is normal in size with an indexed volume of 22.97 ml/m².     Aortic Valve:  There is severe calcification of the aortic valve leaflets. There is severe aortic stenosis. The peak transaortic velocity is 5.57 m/s, peak transaortic gradient is 124.1 mmHg and mean transaortic gradient is 73.0 mmHg with an LVOT/aortic valve VTI ratio of 0.28. The aortic valve area is estimated at 0.81 cm² by the continuity equation. The peak transaortic velocity is 5.57 m/s, peak transaortic gradient is 124.1 mmHg and mean transaortic gradient is 73.0 mmHg with an LVOT/aortic valve VTI ratio of 0.28. The aortic valve area is estimated at 0.81 cm² by the continuity equation. There is mild aortic regurgitation.     Mitral Valve:  There is mitral valve thickening of the anterior and posterior leaflets. There is moderate calcification of the mitral valve annulus. There is mild mitral regurgitation.     Tricuspid Valve:  Structurally normal tricuspid valve with normal leaflet excursion. There is mild tricuspid regurgitation.     Pulmonic Valve:  The pulmonic valve was not well visualized.     Aorta:  The aortic root at the sinuses of Valsalva is normal in size. The aortic arch diameter is normal in size.     Pericardium:  The pericardium is thickened. There is a small pericardial effusion.     Systemic Veins:  The inferior vena cava is normal in size measuring 1.22 cm in diameter, (normal <2.1cm) with normal inspiratory collapse (normal >50%) consistent with normal right atrial pressure (~3, range 0-5mmHg).  ____________________________________________________________________  QUANTITATIVE DATA:  Left Ventricle Measurements: (Indexed to BSA)     IVSd (2D):   1.4 cm  LVPWd (2D):  1.4 cm  LVIDd (2D):  4.5 cm  LVIDs (2D):  3.2 cm  LV Mass:     256 g  149.5 g/m²  LV Vol d, MOD A2C: 114.0 ml 66.64 ml/m²  LV Vol d, MOD A4C: 147.0 ml 85.93 ml/m²  LV Vol d, MOD BP:  133.9 ml 78.26 ml/m²  LV Vol s, MOD A2C: 39.5 ml  23.09 ml/m²  LV Vol s, MOD A4C: 70.1 ml  40.98 ml/m²  LV Vol s, MOD BP:  56.1 ml  32.80 ml/m²  LVOT SV MOD BP:    77.8 ml  LV EF% MOD BP:     58 %     MV E Vmax:    0.78 m/s  MV A Vmax:    1.34 m/s  MV E/A:       0.58  e' lateral:   4.57 cm/s  e' medial:    2.94 cm/s  E/e' lateral: 17.02  E/e' medial:  26.46  E/e' Average: 20.72  MV DT:        275 msec    Aorta Measurements: (Normal range) (Indexed to BSA)     Ao Root d 3.00 cm (2.7 - 3.3 cm) 1.75 cm/m²  Ao Arch:  2.3 cm            Left Atrium Measurements: (Indexed to BSA)  LA Diam 2D: 4.10 cm         Right Ventricle Measurements: Right Atrial Measurements:     RV Base (RVID1):  3.1 cm      RA Vol:            39.30 ml  RV Mid (RVID2):   2.5 cm      RA Vol s, MOD A4C  39.3 ml  RV Major (RVID3): 9.0 cm      RA Vol Index:      22.97 ml/m²                              RA Area s, MOD A4C 15.8 cm²       LVOT / RVOT/ Qp/Qs Data: (Indexed to BSA)  LVOT Diameter:  1.90 cm  LVOT Area:      2.84 cm²  LVOT Vmax:      1.53 m/s  LVOT Vmn:       1.070 m/s  LVOT VTI:       36.10 cm  LVOT peak grad:9 mmHg  LVOT mean grad: 4.5 mmHg  LVOT SV:        102.4 ml  59.83 ml/m²    Aortic Valve Measurements:  AV Vmax:                5.6 m/s  AV Peak Gradient:       124.1 mmHg  AV Mean Gradient:       73.0 mmHg  AV VTI:                 127.0 cm  AV VTI Ratio:           0.28  AoV EOA, Contin:        0.81 cm²  AoV EOA, Contin i:      0.47 cm²/m²  AoV Dimensionless Index 0.28  AR Vmax                 3.85 m/s  AR PHT                  265 msec  AR [place]                4.26 m/s²    Mitral Valve Measurements:     MV E Vmax: 0.8 m/s  MV A Vmax: 1.3 m/s  MV E/A:    0.6       Tricuspid Valve Measurements:     RA Pressure: 3 mmHg       ________________________________________________________________________________________  Electronically signed on 2/3/2025at 9:58:07 AM by Byron Ortiz M.D.         *** Final ***      Radiology:        
Patient seen and examined at bedside. Pain well controlled with medication. Patient denies any numbness, tingling, weakness, or any other orthopaedic complaint. Denies N/V/CP/SOB.    LABS:                        11.3   13.82 )-----------( 165      ( 03 Feb 2025 05:15 )             33.3     02-03    137  |  103  |  29[H]  ----------------------------<  116[H]  3.6   |  31  |  0.56    Ca    8.7      03 Feb 2025 05:15  Mg     2.7     02-02    TPro  7.2  /  Alb  3.1[L]  /  TBili  2.6[H]  /  DBili  x   /  AST  65[H]  /  ALT  53  /  AlkPhos  91  02-02    PT/INR - ( 03 Feb 2025 05:15 )   PT: 11.9 sec;   INR: 1.01 ratio         PTT - ( 03 Feb 2025 05:15 )  PTT:27.4 sec  Urinalysis Basic - ( 03 Feb 2025 05:15 )    Color: x / Appearance: x / SG: x / pH: x  Gluc: 116 mg/dL / Ketone: x  / Bili: x / Urobili: x   Blood: x / Protein: x / Nitrite: x   Leuk Esterase: x / RBC: x / WBC x   Sq Epi: x / Non Sq Epi: x / Bacteria: x        Urinalysis with Rflx Culture (collected 02-02-25 @ 13:58)      VITAL SIGNS:  T(C): 36.9 (02-03-25 @ 04:46), Max: 37.1 (02-02-25 @ 18:21)  HR: 69 (02-03-25 @ 04:46) (69 - 81)  BP: 112/65 (02-03-25 @ 04:46) (101/66 - 180/84)  RR: 20 (02-03-25 @ 04:46) (18 - 22)  SpO2: 95% (02-03-25 @ 04:46) (92% - 100%)    Physical Exam:  General: NAD, pt laying in bed comfortably    RLE:  Skin intact, TTP R hip, extremity shortened and rotated  Compartments soft, compressible  Calves nontender  Motor: +GSC, TA, EHL, FHL  SILT: SPN, DPN, Tib, Saph, Juanito  +DP      A/P:  79y Female who presents with subtroch hip FX    Plan for OR today with Dr. Bazzi  NWDES RLE  Analgesics  NPO, except medications  Followup AM labs  Hold chemical DVT ppx, SCDs OK  Please document medical, cardiac clearance/optimization for OR    Discussed plan with Dr. Bazzi who agrees with above  
[facility] -- Aubrey Villalobos Pannella, Patel, Savella Goodger, Cohen  Office # [phone]      Follow Up:    Cardiac optimization   Subjective/Observations:     No events overnight resting comfortably in bed.  No complaints of chest pain, dyspnea, or palpitations reported. No signs of orthopnea or PND.  Bp was low this am given IVF now improved    REVIEW OF SYSTEMS: All other review of systems is negative unless indicated above    PAST MEDICAL & SURGICAL HISTORY:  AS (aortic stenosis)  moderate ( ECHO )  "asymptomatic"      HTN (hypertension)      HLD (hyperlipidemia)      Dry eyes      Colon cancer  adenocarcinoma      Acid reflux      OP (osteoporosis)      AV block      Syncope  last       Carotid artery disease  on follow up      H/O tubal ligation      S/P colon polypectomy          MEDICATIONS  (STANDING):  acetaminophen     Tablet .. 975 milliGRAM(s) Oral every 8 hours  enoxaparin Injectable 40 milliGRAM(s) SubCutaneous every 24 hours  pantoprazole    Tablet 40 milliGRAM(s) Oral before breakfast  potassium chloride  10 mEq/100 mL IVPB 10 milliEquivalent(s) IV Intermittent every 1 hour  sodium chloride 0.9%. 1000 milliLiter(s) (50 mL/Hr) IV Continuous <Continuous>    MEDICATIONS  (PRN):  magnesium hydroxide Suspension 30 milliLiter(s) Oral daily PRN Constipation  ondansetron Injectable 4 milliGRAM(s) IV Push every 6 hours PRN Nausea and/or Vomiting  oxyCODONE    IR 5 milliGRAM(s) Oral every 4 hours PRN Moderate Pain (4 - 6)  oxyCODONE    IR 10 milliGRAM(s) Oral every 4 hours PRN Severe Pain (7 - 10)  polyethylene glycol 3350 17 Gram(s) Oral at bedtime PRN Constipation  senna 2 Tablet(s) Oral at bedtime PRN Constipation  traMADol 50 milliGRAM(s) Oral every 4 hours PRN Mild Pain (1 - 3)      Allergies    Demerol HCl (Hives)  Demerol HCl (Vomiting)    Intolerances        Vital Signs Last 24 Hrs  T(C): 36.7 (2025 08:52), Max: 36.9 (2025 12:37)  T(F): 98.1 (2025 08:52), Max: 98.4 (2025 12:37)  HR: 86 (2025 10:08) (65 - 86)  BP: 154/66 (2025 10:08) (90/53 - 154/66)  BP(mean): 64 (2025 20:01) (64 - 74)  RR: 17 (2025 08:52) (14 - 20)  SpO2: 96% (2025 10:08) (92% - 98%)    Parameters below as of 2025 10:08  Patient On (Oxygen Delivery Method): room air        I&O's Summary    2025 07:01  -  2025 07:00  --------------------------------------------------------  IN: 240 mL / OUT: 300 mL / NET: -60 mL      Weight (kg): 68 ( @ 14:22)    PHYSICAL EXAM:  TELE: Sr  Constitutional: NAD, awake and alert, well-developed  HEENT: Moist Mucous Membranes, Anicteric  Pulmonary: Non-labored, breath sounds are clear bilaterally, No wheezing, crackles or rhonchi  Cardiovascular: Regular, S1 and S2 nl, murmur   Gastrointestinal: Bowel Sounds present, soft, nontender.   Lymph: No lymphadenopathy. No peripheral edema.  Skin: No visible rashes or ulcers.  Psych:  Mood & affect appropriate    LABS: All Labs Reviewed:                        9.7    13.03 )-----------( 167      ( 2025 09:06 )             27.9                         10.4   17.15 )-----------( 169      ( 2025 20:25 )             30.8                         11.5   12.60 )-----------( 157      ( 2025 07:37 )             33.6     2025 09:06    135    |  100    |  24     ----------------------------<  167    3.9     |  27     |  0.67   2025 20:25    137    |  103    |  31     ----------------------------<  237    4.1     |  29     |  0.82   2025 07:37    138    |  102    |  30     ----------------------------<  111    3.4     |  32     |  0.57     Ca    8.1        2025 09:06  Ca    8.4        2025 20:25  Ca    8.6        2025 07:37  Phos  3.0       2025 09:06  Phos  2.4       2025 07:37  Mg     2.4       2025 09:06  Mg     2.7       2025 07:37  Mg     2.7       2025 13:29    TPro  5.3    /  Alb  2.2    /  TBili  1.6    /  DBili  0.4    /  AST  83     /  ALT  83     /  AlkPhos  78     2025 09:06  TPro  5.6    /  Alb  2.7    /  TBili  2.5    /  DBili  x      /  AST  57     /  ALT  43     /  AlkPhos  72     2025 07:37  TPro  7.2    /  Alb  3.1    /  TBili  2.6    /  DBili  x      /  AST  65     /  ALT  53     /  AlkPhos  91     2025 13:29    PT/INR - ( 2025 05:15 )   PT: 11.9 sec;   INR: 1.01 ratio         PTT - ( 2025 05:15 )  PTT:27.4 sec         EC Lead ECG:   Ventricular Rate 69 BPM    Atrial Rate 69 BPM    P-R Interval 190 ms    QRS Duration 90 ms    Q-T Interval 442 ms    QTC Calculation(Bazett) 473 ms    P Axis 47 degrees    R Axis 12 degrees    T Axis 155 degrees    Diagnosis Line Normal sinus rhythm  Left ventricular hypertrophy with repolarization abnormality ( R in aVL , Sokolow-Ogden , [place] product , Romhilt-Venegas )  Cannot rule out Septal infarct , age undetermined  Abnormal ECG  No previous ECGs available  Confirmed by Belinda Bauman ([id number]) on 2025 2:44:23 PM (25 @ 13:11)      TRANSTHORACIC ECHOCARDIOGRAM REPORT  ________________________________________________________________________________                                      _______       Pt. Name:       SAMANTHA RAIN Study Date:    2/3/2025  MRN:            [id number] YOB: 1945  Accession #:    [id number]   Age:           79 years  Account#:       [id number]  Gender:        F  Heart Rate:                 Height:        62.99 in (160.00 cm)  Rhythm:                     Weight:        149.91 lb (68.00kg)  Blood Pressure: 112/68 mmHg BSA/BMI:       1.71 m² / 26.56 kg/m²  ________________________________________________________________________________________  Referring Physician:    [id number] Kvng Baez  Interpreting Physician: Byron Ortiz M.D.  Primary Sonographer:    Mary Snell RDCS    CPT:               ECHO TTE WO CON COMP W DOPP - 74110.m  Indication(s):     Other cerebrovascular disease - I67.89  Procedure:         Transthoracic echocardiogram with 2-D, M-mode and complete                  spectral and color flow Doppler.  Ordering Location: [facility]  Admission Status:  Inpatient  Study Information: Image quality for this study is technically difficult.    _______________________________________________________________________________________     CONCLUSIONS:      1. Technically difficult image quality.   2. Left ventricular systolic function is normal with an ejection fraction of 58 % by Rodriguez's method of disks.   3. Left atrium is mildly dilated.   4. The peak transaortic velocity is 5.57 m/s, peak transaortic gradient is 124.1 mmHg and mean transaortic gradient is 73.0 mmHg with an LVOT/aortic valve VTI ratio of 0.28. The effective orifice area is estimated at 0.81 cm² by the continuity equation.   5. There is severe calcification of the aortic valve leaflets.   6. There is mild (grade 1) left ventricular diastolic dysfunction.   7. Normal right ventricular systolic function.   8. Thickened pericardium.    ________________________________________________________________________________________  FINDINGS:     Left Ventricle:  Left ventricular systolic function is normal with a calculated ejection fraction of 58 % by the Rodriguez's biplane method of disks. Severe left ventricular hypertrophy. There is mild(grade 1) left ventricular diastolic dysfunction.     Right Ventricle:  The right ventricular cavity is normal in size and right ventricular systolic function is normal.     Left Atrium:  The left atrium is mildly dilated with an indexed volume of 41.74 ml/m².     Right Atrium:  The right atrium is normal in size with an indexed volume of 22.97 ml/m².     Aortic Valve:  There is severe calcification of the aortic valve leaflets. There is severe aortic stenosis. The peak transaortic velocity is 5.57 m/s, peak transaortic gradient is 124.1 mmHg and mean transaortic gradient is 73.0 mmHg with an LVOT/aortic valve VTI ratio of 0.28. The aortic valve area is estimated at 0.81 cm² by the continuity equation. The peak transaortic velocity is 5.57 m/s, peak transaortic gradient is 124.1 mmHg and mean transaortic gradient is 73.0 mmHg with an LVOT/aortic valve VTI ratio of 0.28. The aortic valve area is estimated at 0.81 cm² by the continuity equation. There is mild aortic regurgitation.     Mitral Valve:  There is mitral valve thickening of the anterior and posterior leaflets. There is moderate calcification of the mitral valve annulus. There is mild mitral regurgitation.     Tricuspid Valve:  Structurally normal tricuspid valve with normal leaflet excursion. There is mild tricuspid regurgitation.     Pulmonic Valve:  The pulmonic valve was not well visualized.     Aorta:  The aortic root at the sinuses of Valsalva is normal in size. The aortic arch diameter is normal in size.     Pericardium:  The pericardium is thickened. There is a small pericardial effusion.     Systemic Veins:  The inferior vena cava is normal in size measuring 1.22 cm in diameter, (normal <2.1cm) with normal inspiratory collapse (normal >50%) consistent with normal right atrial pressure (~3, range 0-5mmHg).  ____________________________________________________________________  QUANTITATIVE DATA:  Left Ventricle Measurements: (Indexed to BSA)     IVSd (2D):   1.4 cm  LVPWd (2D):  1.4 cm  LVIDd (2D):  4.5 cm  LVIDs (2D):  3.2 cm  LV Mass:     256 g  149.5 g/m²  LV Vol d, MOD A2C: 114.0 ml 66.64 ml/m²  LV Vol d, MOD A4C: 147.0 ml 85.93 ml/m²  LV Vol d, MOD BP:  133.9 ml 78.26 ml/m²  LV Vol s, MOD A2C: 39.5 ml  23.09 ml/m²  LV Vol s, MOD A4C: 70.1 ml  40.98 ml/m²  LV Vol s, MOD BP:  56.1 ml  32.80 ml/m²  LVOT SV MOD BP:    77.8 ml  LV EF% MOD BP:     58 %     MV E Vmax:    0.78 m/s  MV A Vmax:    1.34 m/s  MV E/A:       0.58  e' lateral:   4.57 cm/s  e' medial:    2.94 cm/s  E/e' lateral: 17.02  E/e' medial:  26.46  E/e' Average: 20.72  MV DT:        275 msec    Aorta Measurements: (Normal range) (Indexed to BSA)     Ao Root d 3.00 cm (2.7 - 3.3 cm) 1.75 cm/m²  Ao Arch:  2.3 cm            Left Atrium Measurements: (Indexed to BSA)  LA Diam 2D: 4.10 cm         Right Ventricle Measurements: Right Atrial Measurements:     RV Base (RVID1):  3.1 cm      RA Vol:            39.30 ml  RV Mid (RVID2):   2.5 cm      RA Vol s, MOD A4C  39.3 ml  RV Major (RVID3): 9.0 cm      RA Vol Index:      22.97 ml/m²                              RA Area s, MOD A4C 15.8 cm²       LVOT / RVOT/ Qp/Qs Data: (Indexed to BSA)  LVOT Diameter:  1.90 cm  LVOT Area:      2.84 cm²  LVOT Vmax:      1.53 m/s  LVOT Vmn:       1.070 m/s  LVOT VTI:       36.10 cm  LVOT peak grad:9 mmHg  LVOT mean grad: 4.5 mmHg  LVOT SV:        102.4 ml  59.83 ml/m²    Aortic Valve Measurements:  AV Vmax:                5.6 m/s  AV Peak Gradient:       124.1 mmHg  AV Mean Gradient:       73.0 mmHg  AV VTI:                 127.0 cm  AV VTI Ratio:           0.28  AoV EOA, Contin:        0.81 cm²  AoV EOA, Contin i:      0.47 cm²/m²  AoV Dimensionless Index 0.28  AR Vmax                 3.85 m/s  AR PHT                  265 msec  AR Miner                4.26 m/s²    Mitral Valve Measurements:     MV E Vmax: 0.8 m/s  MV A Vmax: 1.3 m/s  MV E/A:    0.6       Tricuspid Valve Measurements:     RA Pressure: 3 mmHg       ________________________________________________________________________________________  Electronically signed on 2/3/2025at 9:58:07 AM by Byron Ortiz M.D.         *** Final ***      Radiology:        
Patient is a 79y old  Female who presents with a chief complaint of R Hip Fracture (03 Feb 2025 06:54)      Subjective:  INTERVAL HPI/OVERNIGHT EVENTS: Patient seen and examined at bedside.  Patient has no complaints at this time.   MEDICATIONS  (STANDING):  amLODIPine   Tablet 10 milliGRAM(s) Oral daily  calcium carbonate 1250 mG  + Vitamin D (OsCal 500 + D) 1 Tablet(s) Oral daily  ceFAZolin   IVPB 2000 milliGRAM(s) IV Intermittent once  escitalopram 20 milliGRAM(s) Oral daily  rosuvastatin 5 milliGRAM(s) Oral at bedtime  sodium chloride 0.9%. 1000 milliLiter(s) (50 mL/Hr) IV Continuous <Continuous>    MEDICATIONS  (PRN):  acetaminophen     Tablet .. 650 milliGRAM(s) Oral every 6 hours PRN Temp greater or equal to 38C (100.4F), Mild Pain (1 - 3)  aluminum hydroxide/magnesium hydroxide/simethicone Suspension 30 milliLiter(s) Oral every 4 hours PRN Dyspepsia  HYDROmorphone  Injectable 0.5 milliGRAM(s) IV Push every 6 hours PRN Severe Pain (7 - 10)  melatonin 3 milliGRAM(s) Oral at bedtime PRN Insomnia  traMADol 50 milliGRAM(s) Oral every 6 hours PRN Moderate Pain (4 - 6)      Allergies    Demerol HCl (Hives)  Demerol HCl (Vomiting)    Intolerances        REVIEW OF SYSTEMS:  CONSTITUTIONAL: No fever or chills  HEENT:  No headache, no sore throat  RESPIRATORY: No cough or shortness of breath  CARDIOVASCULAR: No chest pain or palpitations  GASTROINTESTINAL: No abd pain, nausea, vomiting, or diarrhea      Objective:  Vital Signs Last 24 Hrs  T(C): 36.9 (03 Feb 2025 04:46), Max: 37.1 (02 Feb 2025 18:21)  T(F): 98.4 (03 Feb 2025 04:46), Max: 98.7 (02 Feb 2025 18:21)  HR: 69 (03 Feb 2025 04:46) (69 - 81)  BP: 112/65 (03 Feb 2025 04:46) (101/66 - 180/84)  BP(mean): --  RR: 20 (03 Feb 2025 04:46) (18 - 22)  SpO2: 95% (03 Feb 2025 04:46) (92% - 100%)    Parameters below as of 03 Feb 2025 04:46  Patient On (Oxygen Delivery Method): room air        GENERAL: NAD, lying in bed comfortably  HEAD:  Normocephalic  EYES:  conjunctiva and sclera clear  ENT: Moist mucous membranes  NECK: Supple  CHEST/LUNG: Clear to auscultation bilaterally; No rales or rhonchi; no wheezing. Unlabored respirations  HEART: Regular rate and rhythm; S1S2+  ABDOMEN: Bowel sounds present; Soft, Nontender, Nondistended.   EXTREMITIES:  + distal Peripheral Pulses;  No cyanosis, or edema  NERVOUS SYSTEM:  Alert & Oriented X3;  No gross focal deficits   MSK: moves all extremities  SKIN: No rashes     LABS:                        11.5   12.60 )-----------( 157      ( 03 Feb 2025 07:37 )             33.6     03 Feb 2025 05:15    137    |  103    |  29     ----------------------------<  116    3.6     |  31     |  0.56     Ca    8.7        03 Feb 2025 05:15  Mg     2.7       02 Feb 2025 13:29    TPro  7.2    /  Alb  3.1    /  TBili  2.6    /  DBili  x      /  AST  65     /  ALT  53     /  AlkPhos  91     02 Feb 2025 13:29    PT/INR - ( 03 Feb 2025 05:15 )   PT: 11.9 sec;   INR: 1.01 ratio         PTT - ( 03 Feb 2025 05:15 )  PTT:27.4 sec  Urinalysis Basic - ( 03 Feb 2025 05:15 )    Color: x / Appearance: x / SG: x / pH: x  Gluc: 116 mg/dL / Ketone: x  / Bili: x / Urobili: x   Blood: x / Protein: x / Nitrite: x   Leuk Esterase: x / RBC: x / WBC x   Sq Epi: x / Non Sq Epi: x / Bacteria: x      CAPILLARY BLOOD GLUCOSE      POCT Blood Glucose.: 120 mg/dL (02 Feb 2025 13:11)        Urinalysis with Rflx Culture (collected 02-02-25 @ 13:58)        RADIOLOGY & ADDITIONAL TESTS:    Personally reviewed.     Consultant(s) Notes Reviewed:  [x] YES  [ ] NO    Plan of care discussed with patient /family; all questions answered  
Patient is a 79y old  Female who presents with a chief complaint of R Hip Fracture (04 Feb 2025 14:29)      Subjective:  INTERVAL HPI/OVERNIGHT EVENTS: Patient seen and examined at bedside.  Patient has right hip pain, c/o dizziness when siting up. no SOB/CP/Palpitation   MEDICATIONS  (STANDING):  acetaminophen     Tablet .. 975 milliGRAM(s) Oral every 8 hours  enoxaparin Injectable 40 milliGRAM(s) SubCutaneous every 24 hours  pantoprazole    Tablet 40 milliGRAM(s) Oral before breakfast  potassium chloride  10 mEq/100 mL IVPB 10 milliEquivalent(s) IV Intermittent every 1 hour  sodium chloride 0.9%. 1000 milliLiter(s) (75 mL/Hr) IV Continuous <Continuous>    MEDICATIONS  (PRN):  magnesium hydroxide Suspension 30 milliLiter(s) Oral daily PRN Constipation  ondansetron Injectable 4 milliGRAM(s) IV Push every 6 hours PRN Nausea and/or Vomiting  oxyCODONE    IR 5 milliGRAM(s) Oral every 4 hours PRN Moderate Pain (4 - 6)  oxyCODONE    IR 10 milliGRAM(s) Oral every 4 hours PRN Severe Pain (7 - 10)  polyethylene glycol 3350 17 Gram(s) Oral at bedtime PRN Constipation  senna 2 Tablet(s) Oral at bedtime PRN Constipation  traMADol 50 milliGRAM(s) Oral every 4 hours PRN Mild Pain (1 - 3)      Allergies    Demerol HCl (Hives)  Demerol HCl (Vomiting)    Intolerances        REVIEW OF SYSTEMS:  CONSTITUTIONAL: No fever or chills  HEENT:  No headache, no sore throat  RESPIRATORY: No cough or shortness of breath  CARDIOVASCULAR: No chest pain or palpitations  GASTROINTESTINAL: No abd pain, nausea, vomiting, or diarrhea      Objective:  Vital Signs Last 24 Hrs  T(C): 36.8 (04 Feb 2025 15:34), Max: 36.9 (04 Feb 2025 11:56)  T(F): 98.2 (04 Feb 2025 15:34), Max: 98.4 (04 Feb 2025 11:56)  HR: 79 (04 Feb 2025 15:34) (65 - 87)  BP: 101/54 (04 Feb 2025 15:34) (90/53 - 154/66)  BP(mean): 64 (03 Feb 2025 20:01) (64 - 74)  RR: 19 (04 Feb 2025 15:34) (14 - 20)  SpO2: 96% (04 Feb 2025 15:34) (94% - 98%)    Parameters below as of 04 Feb 2025 15:34  Patient On (Oxygen Delivery Method): room air        GENERAL: NAD, lying in bed comfortably  HEAD:  Normocephalic  EYES:  conjunctiva and sclera clear  ENT: Moist mucous membranes  NECK: Supple  CHEST/LUNG: Clear to auscultation bilaterally; No rales or rhonchi; no wheezing. Unlabored respirations  HEART: Regular rate and rhythm; S1S2+  ABDOMEN: Bowel sounds present; Soft, Nontender, Nondistended.   EXTREMITIES:  + distal Peripheral Pulses;  No cyanosis, or edema, R hip Sx site Aquacel dressing on, C/D/I, NO SIGN OF ACTIVE bleed   NERVOUS SYSTEM:  Alert & Oriented X3;  No gross focal deficits   MSK: moves all extremities, ROM and strength decreased RLE due to post Sx.   SKIN: No rashes     LABS:                        9.7    13.03 )-----------( 167      ( 04 Feb 2025 09:06 )             27.9     04 Feb 2025 09:06    135    |  100    |  24     ----------------------------<  167    3.9     |  27     |  0.67     Ca    8.1        04 Feb 2025 09:06  Phos  3.0       04 Feb 2025 09:06  Mg     2.4       04 Feb 2025 09:06    TPro  5.3    /  Alb  2.2    /  TBili  1.6    /  DBili  0.4    /  AST  83     /  ALT  83     /  AlkPhos  78     04 Feb 2025 09:06    PT/INR - ( 03 Feb 2025 05:15 )   PT: 11.9 sec;   INR: 1.01 ratio         PTT - ( 03 Feb 2025 05:15 )  PTT:27.4 sec  Urinalysis Basic - ( 04 Feb 2025 09:06 )    Color: x / Appearance: x / SG: x / pH: x  Gluc: 167 mg/dL / Ketone: x  / Bili: x / Urobili: x   Blood: x / Protein: x / Nitrite: x   Leuk Esterase: x / RBC: x / WBC x   Sq Epi: x / Non Sq Epi: x / Bacteria: x      CAPILLARY BLOOD GLUCOSE            Urinalysis with Rflx Culture (collected 02-02-25 @ 13:58)        RADIOLOGY & ADDITIONAL TESTS:    Personally reviewed.     Consultant(s) Notes Reviewed:  [x] YES  [ ] NO    Plan of care discussed with patient /family Son Song on the phone ; all questions answered

## 2025-02-05 NOTE — DISCHARGE NOTE NURSING/CASE MANAGEMENT/SOCIAL WORK - FINANCIAL ASSISTANCE
[facility] provides services at a reduced cost to those who are determined to be eligible through [facility]’s financial assistance program. Information regarding [facility]’s financial assistance program can be found by going to [email or web address] or by calling [phone].

## 2025-02-05 NOTE — DISCHARGE NOTE NURSING/CASE MANAGEMENT/SOCIAL WORK - PATIENT PORTAL LINK FT
You can access the FollowMyHealth Patient Portal offered by [facility] by registering at the following website: [email or web address]. By joining [facility]’s FollowMyHealth portal, you will also be able to view your health information using other applications (apps) compatible with our system.

## 2025-02-05 NOTE — PROGRESS NOTE ADULT - NS ATTEND AMEND GEN_ALL_CORE FT
80yo F PMHx of severe aortic stenosis, vagally-mediated presyncope/syncope, ventricular ectopy, moderate carotid atherosclerosis, HTN, HLD. Case was consulted d/t clearance for orthopedic intervention for right intertrochanteric fracture.    -Sp fall , laid floor x 4 days found with Subtrochanteric fracture of right femur   -sp right hip IMN   -Rhabdo , CK improving sp ivf   -holding home norvasc for now , bp now improving   on dc to Fu Dr Villalobos

## 2025-02-05 NOTE — PROGRESS NOTE ADULT - ASSESSMENT
80yo F PMHx of severe aortic stenosis, vagally-mediated presyncope/syncope, ventricular ectopy, moderate carotid atherosclerosis, HTN, HLD. Case was consulted d/t clearance for orthopedic intervention for right intertrochanteric fracture.    -Sp fall , laid floor x 4 days found with Subtrochanteric fracture of right femur   -sp right hip IMN   -pain control  -PT, fu ortho   -Rhabdo , CK improving sp ivf     - No meaningful evidence of volume overload. sp ivf this am for soft bp now improved   -holding home norvasc for now , bp now improving   - Previous TTE shows severe AS, mild-moderate aortic regurgitation, LV systolic function estimated 60-65%.  - Strict I/Os, daily weights.    - Monitor and replete lytes, keep K>4, Mg>2.  on dc to Fu Dr Villalobos   
Rhabdomyolysis, s/p Fall  Hypokalemia  Rt hip fracture, s/p Fall, s/p surgery 02/03/25  Hypertension    02/03/25: Improving CPK levels. Continue IV hydration. Trend BP and titrate BP meds as needed. Potassium supplementation. For OR today.   No contraindications for the planned hip surgery from renal standpoint. Will follow electrolytes and renal function trend.   02/04/25: S/p hip surgery. Improving CPK levels. To continue current meds. Stable renal indices. 
Rhabdomyolysis, s/p Fall  Hypokalemia  Rt hip fracture, s/p Fall, s/p surgery 02/03/25  Hypertension    02/03/25: Improving CPK levels. Continue IV hydration. Trend BP and titrate BP meds as needed. Potassium supplementation. For OR today.   No contraindications for the planned hip surgery from renal standpoint. Will follow electrolytes and renal function trend.   02/04/25: S/p hip surgery. Improving CPK levels. To continue current meds. Stable renal indices.   02/05/25: Improving CPK levels. D/c IVF. Hgb trending down. Monitor h/h trend. Transfuse PRBC's PRN. Ortho follow up. D/c planning. 
80yo F PMHx of severe aortic stenosis, vagally-mediated presyncope/syncope, ventricular ectopy, moderate carotid atherosclerosis, HTN, HLD. Case was consulted d/t clearance for orthopedic intervention for right intertrochanteric fracture.    -Sp fall , laid floor x 4 days found with Subtrochanteric fracture of right femur   -sp right hip IMN   -pain control  -PT, fu ortho     - No meaningful evidence of volume overload. sp ivf this am for soft bp now improved   -hold home norvasc for now   - Previous TTE shows severe AS, mild-moderate aortic regurgitation, LV systolic function estimated 60-65%.  - Strict I/Os, daily weights.    - Monitor and replete lytes, keep K>4, Mg>2.  on dc to Fu Dr Villalobos   
78yo F PMHx HTN, HLD who presents with fall, right hip pain and prolonged down time s/p fall. Admitted for operative management of R hip fracture.
80yo F PMHx HTN, HLD who presents with fall, right hip pain and prolonged down time s/p fall. Admitted for operative management of R hip fracture.

## 2025-02-14 ENCOUNTER — RX RENEWAL (OUTPATIENT)
Age: 80
End: 2025-02-14

## 2025-03-17 ENCOUNTER — APPOINTMENT (OUTPATIENT)
Dept: NEUROLOGY | Facility: CLINIC | Age: 80
End: 2025-03-17

## 2025-03-17 ENCOUNTER — APPOINTMENT (OUTPATIENT)
Dept: CARDIOLOGY | Facility: CLINIC | Age: 80
End: 2025-03-17

## 2025-05-01 ENCOUNTER — NON-APPOINTMENT (OUTPATIENT)
Age: 80
End: 2025-05-01

## 2025-05-01 ENCOUNTER — APPOINTMENT (OUTPATIENT)
Dept: WOUND CARE | Facility: HOSPITAL | Age: 80
End: 2025-05-01
Payer: MEDICARE

## 2025-05-01 ENCOUNTER — OUTPATIENT (OUTPATIENT)
Dept: OUTPATIENT SERVICES | Facility: HOSPITAL | Age: 80
LOS: 1 days | End: 2025-05-01
Payer: MEDICARE

## 2025-05-01 VITALS
HEART RATE: 67 BPM | OXYGEN SATURATION: 98 % | TEMPERATURE: 97.7 F | WEIGHT: 157 LBS | BODY MASS INDEX: 27.82 KG/M2 | HEIGHT: 63 IN | RESPIRATION RATE: 18 BRPM | DIASTOLIC BLOOD PRESSURE: 64 MMHG | SYSTOLIC BLOOD PRESSURE: 103 MMHG

## 2025-05-01 DIAGNOSIS — Z83.79 FAMILY HISTORY OF OTHER DISEASES OF THE DIGESTIVE SYSTEM: ICD-10-CM

## 2025-05-01 DIAGNOSIS — L89.153 PRESSURE ULCER OF SACRAL REGION, STAGE 3: ICD-10-CM

## 2025-05-01 DIAGNOSIS — I35.0 NONRHEUMATIC AORTIC (VALVE) STENOSIS: ICD-10-CM

## 2025-05-01 DIAGNOSIS — Z79.899 OTHER LONG TERM (CURRENT) DRUG THERAPY: ICD-10-CM

## 2025-05-01 DIAGNOSIS — Z87.81 PERSONAL HISTORY OF (HEALED) TRAUMATIC FRACTURE: ICD-10-CM

## 2025-05-01 DIAGNOSIS — K21.9 GASTRO-ESOPHAGEAL REFLUX DISEASE WITHOUT ESOPHAGITIS: ICD-10-CM

## 2025-05-01 DIAGNOSIS — Z86.0100 PERSONAL HISTORY OF COLON POLYPS, UNSPECIFIED: ICD-10-CM

## 2025-05-01 DIAGNOSIS — Z85.038 PERSONAL HISTORY OF OTHER MALIGNANT NEOPLASM OF LARGE INTESTINE: ICD-10-CM

## 2025-05-01 DIAGNOSIS — Z88.5 ALLERGY STATUS TO NARCOTIC AGENT: ICD-10-CM

## 2025-05-01 DIAGNOSIS — Z98.51 TUBAL LIGATION STATUS: Chronic | ICD-10-CM

## 2025-05-01 DIAGNOSIS — I10 ESSENTIAL (PRIMARY) HYPERTENSION: ICD-10-CM

## 2025-05-01 DIAGNOSIS — E78.5 HYPERLIPIDEMIA, UNSPECIFIED: ICD-10-CM

## 2025-05-01 DIAGNOSIS — R73.03 PREDIABETES: ICD-10-CM

## 2025-05-01 DIAGNOSIS — Z98.890 OTHER SPECIFIED POSTPROCEDURAL STATES: Chronic | ICD-10-CM

## 2025-05-01 DIAGNOSIS — I65.29 OCCLUSION AND STENOSIS OF UNSPECIFIED CAROTID ARTERY: ICD-10-CM

## 2025-05-01 DIAGNOSIS — L89.300 PRESSURE ULCER OF UNSPECIFIED BUTTOCK, UNSTAGEABLE: ICD-10-CM

## 2025-05-01 DIAGNOSIS — Z91.81 HISTORY OF FALLING: ICD-10-CM

## 2025-05-01 DIAGNOSIS — Z87.891 PERSONAL HISTORY OF NICOTINE DEPENDENCE: ICD-10-CM

## 2025-05-01 DIAGNOSIS — M85.80 OTHER SPECIFIED DISORDERS OF BONE DENSITY AND STRUCTURE, UNSPECIFIED SITE: ICD-10-CM

## 2025-05-01 PROCEDURE — 99204 OFFICE O/P NEW MOD 45 MIN: CPT

## 2025-05-01 PROCEDURE — G0463: CPT

## 2025-05-07 ENCOUNTER — APPOINTMENT (OUTPATIENT)
Dept: CARDIOLOGY | Facility: CLINIC | Age: 80
End: 2025-05-07
Payer: MEDICARE

## 2025-05-07 VITALS
DIASTOLIC BLOOD PRESSURE: 93 MMHG | HEART RATE: 106 BPM | BODY MASS INDEX: 25.52 KG/M2 | SYSTOLIC BLOOD PRESSURE: 170 MMHG | OXYGEN SATURATION: 95 % | WEIGHT: 144 LBS | HEIGHT: 63 IN

## 2025-05-07 DIAGNOSIS — I10 ESSENTIAL (PRIMARY) HYPERTENSION: ICD-10-CM

## 2025-05-07 DIAGNOSIS — E78.5 HYPERLIPIDEMIA, UNSPECIFIED: ICD-10-CM

## 2025-05-07 DIAGNOSIS — I35.0 NONRHEUMATIC AORTIC (VALVE) STENOSIS: ICD-10-CM

## 2025-05-07 DIAGNOSIS — R94.31 ABNORMAL ELECTROCARDIOGRAM [ECG] [EKG]: ICD-10-CM

## 2025-05-07 PROCEDURE — G2211 COMPLEX E/M VISIT ADD ON: CPT

## 2025-05-07 PROCEDURE — 99215 OFFICE O/P EST HI 40 MIN: CPT

## 2025-05-07 PROCEDURE — 93000 ELECTROCARDIOGRAM COMPLETE: CPT

## 2025-05-07 RX ORDER — MULTIVIT-MIN/IRON/FOLIC ACID/K 18-600-40
500 CAPSULE ORAL
Refills: 0 | Status: ACTIVE | COMMUNITY

## 2025-05-07 RX ORDER — DOCUSATE SODIUM 100 MG/1
CAPSULE ORAL
Refills: 0 | Status: ACTIVE | COMMUNITY

## 2025-05-07 RX ORDER — MIDODRINE HYDROCHLORIDE 10 MG/1
TABLET ORAL
Refills: 0 | Status: DISCONTINUED | COMMUNITY
End: 2025-05-07

## 2025-05-07 RX ORDER — DIAPER,BRIEF,INFANT-TODD,DISP
EACH MISCELLANEOUS
Refills: 0 | Status: ACTIVE | COMMUNITY

## 2025-05-15 ENCOUNTER — APPOINTMENT (OUTPATIENT)
Dept: WOUND CARE | Facility: HOSPITAL | Age: 80
End: 2025-05-15
Payer: MEDICARE

## 2025-05-15 ENCOUNTER — OUTPATIENT (OUTPATIENT)
Dept: OUTPATIENT SERVICES | Facility: HOSPITAL | Age: 80
LOS: 1 days | End: 2025-05-15
Payer: MEDICARE

## 2025-05-15 VITALS
TEMPERATURE: 99.1 F | HEIGHT: 63 IN | BODY MASS INDEX: 25.52 KG/M2 | HEART RATE: 80 BPM | DIASTOLIC BLOOD PRESSURE: 71 MMHG | OXYGEN SATURATION: 96 % | RESPIRATION RATE: 18 BRPM | SYSTOLIC BLOOD PRESSURE: 111 MMHG | WEIGHT: 144 LBS

## 2025-05-15 DIAGNOSIS — Z98.890 OTHER SPECIFIED POSTPROCEDURAL STATES: Chronic | ICD-10-CM

## 2025-05-15 DIAGNOSIS — L89.300 PRESSURE ULCER OF UNSPECIFIED BUTTOCK, UNSTAGEABLE: ICD-10-CM

## 2025-05-15 DIAGNOSIS — Z83.79 FAMILY HISTORY OF OTHER DISEASES OF THE DIGESTIVE SYSTEM: ICD-10-CM

## 2025-05-15 DIAGNOSIS — Z79.899 OTHER LONG TERM (CURRENT) DRUG THERAPY: ICD-10-CM

## 2025-05-15 DIAGNOSIS — I10 ESSENTIAL (PRIMARY) HYPERTENSION: ICD-10-CM

## 2025-05-15 DIAGNOSIS — Z98.51 TUBAL LIGATION STATUS: Chronic | ICD-10-CM

## 2025-05-15 DIAGNOSIS — R73.03 PREDIABETES: ICD-10-CM

## 2025-05-15 DIAGNOSIS — L89.153 PRESSURE ULCER OF SACRAL REGION, STAGE 3: ICD-10-CM

## 2025-05-15 DIAGNOSIS — M85.80 OTHER SPECIFIED DISORDERS OF BONE DENSITY AND STRUCTURE, UNSPECIFIED SITE: ICD-10-CM

## 2025-05-15 DIAGNOSIS — I65.29 OCCLUSION AND STENOSIS OF UNSPECIFIED CAROTID ARTERY: ICD-10-CM

## 2025-05-15 DIAGNOSIS — K21.9 GASTRO-ESOPHAGEAL REFLUX DISEASE WITHOUT ESOPHAGITIS: ICD-10-CM

## 2025-05-15 DIAGNOSIS — Z87.891 PERSONAL HISTORY OF NICOTINE DEPENDENCE: ICD-10-CM

## 2025-05-15 DIAGNOSIS — Z85.038 PERSONAL HISTORY OF OTHER MALIGNANT NEOPLASM OF LARGE INTESTINE: ICD-10-CM

## 2025-05-15 DIAGNOSIS — E78.5 HYPERLIPIDEMIA, UNSPECIFIED: ICD-10-CM

## 2025-05-15 DIAGNOSIS — Z88.5 ALLERGY STATUS TO NARCOTIC AGENT: ICD-10-CM

## 2025-05-15 DIAGNOSIS — I35.0 NONRHEUMATIC AORTIC (VALVE) STENOSIS: ICD-10-CM

## 2025-05-15 PROCEDURE — G0463: CPT

## 2025-05-15 PROCEDURE — 99213 OFFICE O/P EST LOW 20 MIN: CPT

## 2025-05-27 ENCOUNTER — APPOINTMENT (OUTPATIENT)
Dept: INTERNAL MEDICINE | Facility: CLINIC | Age: 80
End: 2025-05-27
Payer: MEDICARE

## 2025-05-27 VITALS
WEIGHT: 149 LBS | HEART RATE: 92 BPM | HEIGHT: 63 IN | DIASTOLIC BLOOD PRESSURE: 82 MMHG | RESPIRATION RATE: 14 BRPM | SYSTOLIC BLOOD PRESSURE: 161 MMHG | BODY MASS INDEX: 26.4 KG/M2 | OXYGEN SATURATION: 96 %

## 2025-05-27 VITALS — DIASTOLIC BLOOD PRESSURE: 80 MMHG | SYSTOLIC BLOOD PRESSURE: 124 MMHG

## 2025-05-27 DIAGNOSIS — E78.5 HYPERLIPIDEMIA, UNSPECIFIED: ICD-10-CM

## 2025-05-27 DIAGNOSIS — S72.001A FRACTURE OF UNSPECIFIED PART OF NECK OF RIGHT FEMUR, INITIAL ENCOUNTER FOR CLOSED FRACTURE: ICD-10-CM

## 2025-05-27 DIAGNOSIS — H91.90 UNSPECIFIED HEARING LOSS, UNSPECIFIED EAR: ICD-10-CM

## 2025-05-27 DIAGNOSIS — I10 ESSENTIAL (PRIMARY) HYPERTENSION: ICD-10-CM

## 2025-05-27 PROCEDURE — G2211 COMPLEX E/M VISIT ADD ON: CPT

## 2025-05-27 PROCEDURE — 99214 OFFICE O/P EST MOD 30 MIN: CPT

## 2025-05-29 ENCOUNTER — OUTPATIENT (OUTPATIENT)
Dept: OUTPATIENT SERVICES | Facility: HOSPITAL | Age: 80
LOS: 1 days | End: 2025-05-29
Payer: MEDICARE

## 2025-05-29 ENCOUNTER — APPOINTMENT (OUTPATIENT)
Dept: WOUND CARE | Facility: HOSPITAL | Age: 80
End: 2025-05-29
Payer: MEDICARE

## 2025-05-29 ENCOUNTER — APPOINTMENT (OUTPATIENT)
Dept: WOUND CARE | Facility: HOSPITAL | Age: 80
End: 2025-05-29

## 2025-05-29 VITALS
RESPIRATION RATE: 18 BRPM | TEMPERATURE: 98.7 F | HEIGHT: 63 IN | HEART RATE: 84 BPM | SYSTOLIC BLOOD PRESSURE: 122 MMHG | DIASTOLIC BLOOD PRESSURE: 86 MMHG | OXYGEN SATURATION: 97 % | WEIGHT: 149 LBS | BODY MASS INDEX: 26.4 KG/M2

## 2025-05-29 DIAGNOSIS — Z91.81 HISTORY OF FALLING: ICD-10-CM

## 2025-05-29 DIAGNOSIS — Z87.81 PERSONAL HISTORY OF (HEALED) TRAUMATIC FRACTURE: ICD-10-CM

## 2025-05-29 DIAGNOSIS — L89.153 PRESSURE ULCER OF SACRAL REGION, STAGE 3: ICD-10-CM

## 2025-05-29 DIAGNOSIS — Z98.51 TUBAL LIGATION STATUS: Chronic | ICD-10-CM

## 2025-05-29 DIAGNOSIS — L89.300 PRESSURE ULCER OF UNSPECIFIED BUTTOCK, UNSTAGEABLE: ICD-10-CM

## 2025-05-29 DIAGNOSIS — Z98.890 OTHER SPECIFIED POSTPROCEDURAL STATES: Chronic | ICD-10-CM

## 2025-05-29 PROCEDURE — G0463: CPT

## 2025-05-29 PROCEDURE — 99213 OFFICE O/P EST LOW 20 MIN: CPT

## 2025-06-01 DIAGNOSIS — L89.153 PRESSURE ULCER OF SACRAL REGION, STAGE 3: ICD-10-CM

## 2025-06-01 DIAGNOSIS — Z88.5 ALLERGY STATUS TO NARCOTIC AGENT: ICD-10-CM

## 2025-06-01 DIAGNOSIS — Z85.038 PERSONAL HISTORY OF OTHER MALIGNANT NEOPLASM OF LARGE INTESTINE: ICD-10-CM

## 2025-06-01 DIAGNOSIS — I65.29 OCCLUSION AND STENOSIS OF UNSPECIFIED CAROTID ARTERY: ICD-10-CM

## 2025-06-01 DIAGNOSIS — I10 ESSENTIAL (PRIMARY) HYPERTENSION: ICD-10-CM

## 2025-06-01 DIAGNOSIS — E78.5 HYPERLIPIDEMIA, UNSPECIFIED: ICD-10-CM

## 2025-06-01 DIAGNOSIS — Z83.79 FAMILY HISTORY OF OTHER DISEASES OF THE DIGESTIVE SYSTEM: ICD-10-CM

## 2025-06-01 DIAGNOSIS — Z91.81 HISTORY OF FALLING: ICD-10-CM

## 2025-06-01 DIAGNOSIS — Z79.899 OTHER LONG TERM (CURRENT) DRUG THERAPY: ICD-10-CM

## 2025-06-01 DIAGNOSIS — Z86.0100 PERSONAL HISTORY OF COLON POLYPS, UNSPECIFIED: ICD-10-CM

## 2025-06-01 DIAGNOSIS — R73.03 PREDIABETES: ICD-10-CM

## 2025-06-01 DIAGNOSIS — Z87.81 PERSONAL HISTORY OF (HEALED) TRAUMATIC FRACTURE: ICD-10-CM

## 2025-06-01 DIAGNOSIS — I35.0 NONRHEUMATIC AORTIC (VALVE) STENOSIS: ICD-10-CM

## 2025-06-01 DIAGNOSIS — Z87.891 PERSONAL HISTORY OF NICOTINE DEPENDENCE: ICD-10-CM

## 2025-06-01 DIAGNOSIS — K21.9 GASTRO-ESOPHAGEAL REFLUX DISEASE WITHOUT ESOPHAGITIS: ICD-10-CM

## 2025-06-01 DIAGNOSIS — M85.80 OTHER SPECIFIED DISORDERS OF BONE DENSITY AND STRUCTURE, UNSPECIFIED SITE: ICD-10-CM

## 2025-06-30 ENCOUNTER — RX RENEWAL (OUTPATIENT)
Age: 80
End: 2025-06-30

## 2025-07-14 ENCOUNTER — APPOINTMENT (OUTPATIENT)
Dept: CARDIOLOGY | Facility: CLINIC | Age: 80
End: 2025-07-14
Payer: MEDICARE

## 2025-07-14 ENCOUNTER — NON-APPOINTMENT (OUTPATIENT)
Age: 80
End: 2025-07-14

## 2025-07-14 VITALS
HEIGHT: 63 IN | HEART RATE: 77 BPM | WEIGHT: 142 LBS | SYSTOLIC BLOOD PRESSURE: 129 MMHG | OXYGEN SATURATION: 96 % | BODY MASS INDEX: 25.16 KG/M2 | DIASTOLIC BLOOD PRESSURE: 77 MMHG

## 2025-07-14 PROCEDURE — 93000 ELECTROCARDIOGRAM COMPLETE: CPT

## 2025-07-14 PROCEDURE — 99215 OFFICE O/P EST HI 40 MIN: CPT

## 2025-07-14 PROCEDURE — G2211 COMPLEX E/M VISIT ADD ON: CPT

## 2025-07-23 ENCOUNTER — RX RENEWAL (OUTPATIENT)
Age: 80
End: 2025-07-23

## 2025-08-11 ENCOUNTER — APPOINTMENT (OUTPATIENT)
Age: 80
End: 2025-08-11
Payer: MEDICARE

## 2025-08-11 VITALS — SYSTOLIC BLOOD PRESSURE: 130 MMHG | DIASTOLIC BLOOD PRESSURE: 80 MMHG

## 2025-08-11 VITALS
DIASTOLIC BLOOD PRESSURE: 79 MMHG | WEIGHT: 143 LBS | RESPIRATION RATE: 12 BRPM | OXYGEN SATURATION: 96 % | HEIGHT: 63 IN | SYSTOLIC BLOOD PRESSURE: 162 MMHG | HEART RATE: 86 BPM | BODY MASS INDEX: 25.34 KG/M2

## 2025-08-11 DIAGNOSIS — Z00.00 ENCOUNTER FOR GENERAL ADULT MEDICAL EXAMINATION W/OUT ABNORMAL FINDINGS: ICD-10-CM

## 2025-08-11 PROCEDURE — 99213 OFFICE O/P EST LOW 20 MIN: CPT

## 2025-08-25 ENCOUNTER — RX RENEWAL (OUTPATIENT)
Age: 80
End: 2025-08-25

## 2025-08-27 ENCOUNTER — APPOINTMENT (OUTPATIENT)
Dept: CARDIOLOGY | Facility: CLINIC | Age: 80
End: 2025-08-27
Payer: MEDICARE

## 2025-08-27 PROCEDURE — 93306 TTE W/DOPPLER COMPLETE: CPT

## (undated) DEVICE — ENDOCUFF VISION SZ 3 SM PRPL

## (undated) DEVICE — BALLOON US ENDO

## (undated) DEVICE — CLAMP BX HOT RAD JAW 3

## (undated) DEVICE — GLV 7.5 PROTEXIS (WHITE)

## (undated) DEVICE — CATH IV SAFE BC 20G X 1.16" (PINK)

## (undated) DEVICE — PLV-STRYKER SYSTEM 8: Type: DURABLE MEDICAL EQUIPMENT

## (undated) DEVICE — TUBING SUCTION 20FT

## (undated) DEVICE — IRRIGATOR BIO SHIELD

## (undated) DEVICE — SOL INJ NS 0.9% 500ML 2 PORT

## (undated) DEVICE — DRSG AQUACEL 3.5 X 6"

## (undated) DEVICE — PACK HIP FRACTURE

## (undated) DEVICE — REAMER STRYKER ORTHO SHAFT MOD TRINKLE

## (undated) DEVICE — PROBE FIAPC DIA 2.3MM/7FR LNTH 220CM/7.2FT

## (undated) DEVICE — TUBING SUCTION CONN 6FT STERILE

## (undated) DEVICE — BRUSH COLONOSCOPY CYTOLOGY

## (undated) DEVICE — PACK IV START WITH CHG

## (undated) DEVICE — SOL IRR POUR NS 0.9% 1000ML

## (undated) DEVICE — PLV-CONSIGN SYNTHES TFN LOCKING SET: Type: DURABLE MEDICAL EQUIPMENT

## (undated) DEVICE — ELCTR GROUNDING PAD ADULT COVIDIEN

## (undated) DEVICE — FOLEY HOLDER STATLOCK 2 WAY ADULT

## (undated) DEVICE — Device

## (undated) DEVICE — SYR ALLIANCE II INFLATION 60ML

## (undated) DEVICE — POLY TRAP ETRAP

## (undated) DEVICE — CATH IV SAFE BC 22G X 1" (BLUE)

## (undated) DEVICE — SENSOR O2 FINGER ADULT

## (undated) DEVICE — CAP TRANSPARENT DISTAL ATTACHMENT DISP

## (undated) DEVICE — SYR LUER LOK 50CC

## (undated) DEVICE — DRSG ALLEVYN LIFE 6 X 6 (PINK)

## (undated) DEVICE — PLV-SCD MACHINE: Type: DURABLE MEDICAL EQUIPMENT

## (undated) DEVICE — SUCTION YANKAUER NO CONTROL VENT

## (undated) DEVICE — DRILL BIT STRYKER ORTHO LOKG 4.2X360MM

## (undated) DEVICE — SNARE EXACTO COLD 9MMX230CM

## (undated) DEVICE — BIOPSY FORCEP RADIAL JAW 4 STANDARD WITH NEEDLE

## (undated) DEVICE — BITE BLOCK ADULT 20 X 27MM (GREEN)

## (undated) DEVICE — TUBING IV SET GRAVITY 3Y 100" MACRO

## (undated) DEVICE — TUBING CAP SET ENDO 24HR USE GI

## (undated) DEVICE — FORCEP RADIAL JAW 4 W NDL 2.4MM 2.8MM 240CM ORANGE DISP

## (undated) DEVICE — VENODYNE/SCD SLEEVE CALF MEDIUM

## (undated) DEVICE — DURABLE MEDICAL EQUIPMENT: Type: DURABLE MEDICAL EQUIPMENT

## (undated) DEVICE — FORCEP RADIAL JAW 4 JUMBO 2.8MM 3.2MM 240CM ORANGE DISP

## (undated) DEVICE — WARMING BLANKET UPPER ADULT